# Patient Record
Sex: MALE | Race: WHITE | Employment: OTHER | URBAN - METROPOLITAN AREA
[De-identification: names, ages, dates, MRNs, and addresses within clinical notes are randomized per-mention and may not be internally consistent; named-entity substitution may affect disease eponyms.]

---

## 2017-01-25 ENCOUNTER — OFFICE VISIT (OUTPATIENT)
Dept: ORTHOPEDIC SURGERY | Age: 57
End: 2017-01-25

## 2017-01-25 VITALS
HEART RATE: 78 BPM | HEIGHT: 73 IN | WEIGHT: 221 LBS | DIASTOLIC BLOOD PRESSURE: 79 MMHG | BODY MASS INDEX: 29.29 KG/M2 | SYSTOLIC BLOOD PRESSURE: 128 MMHG

## 2017-01-25 DIAGNOSIS — G89.29 CHRONIC PAIN OF LEFT KNEE: Primary | ICD-10-CM

## 2017-01-25 DIAGNOSIS — M25.562 CHRONIC PAIN OF LEFT KNEE: Primary | ICD-10-CM

## 2017-01-25 PROCEDURE — 99213 OFFICE O/P EST LOW 20 MIN: CPT | Performed by: ORTHOPAEDIC SURGERY

## 2017-01-25 PROCEDURE — 20610 DRAIN/INJ JOINT/BURSA W/O US: CPT | Performed by: ORTHOPAEDIC SURGERY

## 2017-01-27 ENCOUNTER — TELEPHONE (OUTPATIENT)
Dept: ORTHOPEDIC SURGERY | Age: 57
End: 2017-01-27

## 2017-03-08 ENCOUNTER — OFFICE VISIT (OUTPATIENT)
Dept: ORTHOPEDIC SURGERY | Age: 57
End: 2017-03-08

## 2017-03-08 VITALS
BODY MASS INDEX: 29.29 KG/M2 | DIASTOLIC BLOOD PRESSURE: 79 MMHG | WEIGHT: 221 LBS | HEIGHT: 73 IN | SYSTOLIC BLOOD PRESSURE: 131 MMHG | HEART RATE: 88 BPM

## 2017-03-08 DIAGNOSIS — M25.562 LEFT KNEE PAIN, UNSPECIFIED CHRONICITY: Primary | ICD-10-CM

## 2017-03-08 PROCEDURE — 99213 OFFICE O/P EST LOW 20 MIN: CPT | Performed by: ORTHOPAEDIC SURGERY

## 2017-03-28 ENCOUNTER — TELEPHONE (OUTPATIENT)
Dept: ORTHOPEDIC SURGERY | Age: 57
End: 2017-03-28

## 2017-05-19 ENCOUNTER — TELEPHONE (OUTPATIENT)
Dept: ORTHOPEDIC SURGERY | Age: 57
End: 2017-05-19

## 2018-09-02 ENCOUNTER — HOSPITAL ENCOUNTER (EMERGENCY)
Age: 58
Discharge: HOME | End: 2018-09-03
Payer: MEDICARE

## 2018-09-02 DIAGNOSIS — F41.1 ANXIETY STATE: Primary | ICD-10-CM

## 2018-09-02 DIAGNOSIS — R51.9 NONINTRACTABLE HEADACHE, UNSPECIFIED CHRONICITY PATTERN, UNSPECIFIED HEADACHE TYPE: ICD-10-CM

## 2018-09-02 DIAGNOSIS — R11.0 NAUSEA: ICD-10-CM

## 2018-09-02 LAB
A/G RATIO: 1.4 (ref 1.1–2.2)
ALBUMIN SERPL-MCNC: 4 G/DL (ref 3.4–5)
ALP BLD-CCNC: 77 U/L (ref 40–129)
ALT SERPL-CCNC: 13 U/L (ref 10–40)
ANION GAP SERPL CALCULATED.3IONS-SCNC: 10 MMOL/L (ref 3–16)
AST SERPL-CCNC: 13 U/L (ref 15–37)
BASOPHILS ABSOLUTE: 0.1 K/UL (ref 0–0.2)
BASOPHILS RELATIVE PERCENT: 0.6 %
BILIRUB SERPL-MCNC: 0.5 MG/DL (ref 0–1)
BUN BLDV-MCNC: 14 MG/DL (ref 7–20)
CALCIUM SERPL-MCNC: 9.2 MG/DL (ref 8.3–10.6)
CHLORIDE BLD-SCNC: 99 MMOL/L (ref 99–110)
CO2: 27 MMOL/L (ref 21–32)
CREAT SERPL-MCNC: 0.7 MG/DL (ref 0.9–1.3)
EOSINOPHILS ABSOLUTE: 0 K/UL (ref 0–0.6)
EOSINOPHILS RELATIVE PERCENT: 0.2 %
GFR AFRICAN AMERICAN: >60
GFR NON-AFRICAN AMERICAN: >60
GLOBULIN: 2.8 G/DL
GLUCOSE BLD-MCNC: 127 MG/DL (ref 70–99)
HCT VFR BLD CALC: 43.5 % (ref 40.5–52.5)
HEMOGLOBIN: 15 G/DL (ref 13.5–17.5)
LYMPHOCYTES ABSOLUTE: 2.2 K/UL (ref 1–5.1)
LYMPHOCYTES RELATIVE PERCENT: 20.8 %
MCH RBC QN AUTO: 28.4 PG (ref 26–34)
MCHC RBC AUTO-ENTMCNC: 34.5 G/DL (ref 31–36)
MCV RBC AUTO: 82.3 FL (ref 80–100)
MONOCYTES ABSOLUTE: 0.6 K/UL (ref 0–1.3)
MONOCYTES RELATIVE PERCENT: 5.4 %
NEUTROPHILS ABSOLUTE: 7.6 K/UL (ref 1.7–7.7)
NEUTROPHILS RELATIVE PERCENT: 73 %
PDW BLD-RTO: 14.9 % (ref 12.4–15.4)
PLATELET # BLD: 194 K/UL (ref 135–450)
PMV BLD AUTO: 8.6 FL (ref 5–10.5)
POTASSIUM SERPL-SCNC: 3.7 MMOL/L (ref 3.5–5.1)
RBC # BLD: 5.29 M/UL (ref 4.2–5.9)
SODIUM BLD-SCNC: 136 MMOL/L (ref 136–145)
TOTAL PROTEIN: 6.8 G/DL (ref 6.4–8.2)
TROPONIN: <0.01 NG/ML
WBC # BLD: 10.4 K/UL (ref 4–11)

## 2018-09-02 PROCEDURE — 80053 COMPREHEN METABOLIC PANEL: CPT

## 2018-09-02 PROCEDURE — 84484 ASSAY OF TROPONIN QUANT: CPT

## 2018-09-02 PROCEDURE — 85025 COMPLETE CBC W/AUTO DIFF WBC: CPT

## 2018-09-02 PROCEDURE — 93005 ELECTROCARDIOGRAM TRACING: CPT | Performed by: EMERGENCY MEDICINE

## 2018-09-02 PROCEDURE — 99285 EMERGENCY DEPT VISIT HI MDM: CPT

## 2018-09-02 ASSESSMENT — PAIN DESCRIPTION - LOCATION: LOCATION: RIB CAGE

## 2018-09-02 ASSESSMENT — PAIN SCALES - GENERAL: PAINLEVEL_OUTOF10: 4

## 2018-09-02 ASSESSMENT — PAIN DESCRIPTION - PAIN TYPE: TYPE: CHRONIC PAIN

## 2018-09-03 ENCOUNTER — APPOINTMENT (OUTPATIENT)
Dept: GENERAL RADIOLOGY | Age: 58
End: 2018-09-03
Payer: MEDICARE

## 2018-09-03 VITALS
TEMPERATURE: 97.7 F | HEIGHT: 72 IN | WEIGHT: 200 LBS | DIASTOLIC BLOOD PRESSURE: 99 MMHG | SYSTOLIC BLOOD PRESSURE: 134 MMHG | RESPIRATION RATE: 20 BRPM | BODY MASS INDEX: 27.09 KG/M2 | OXYGEN SATURATION: 95 % | HEART RATE: 64 BPM

## 2018-09-03 LAB
BACTERIA: ABNORMAL /HPF
BILIRUBIN URINE: NEGATIVE
BLOOD, URINE: ABNORMAL
CLARITY: CLEAR
COLOR: YELLOW
EKG ATRIAL RATE: 72 BPM
EKG DIAGNOSIS: NORMAL
EKG P AXIS: 35 DEGREES
EKG P-R INTERVAL: 154 MS
EKG Q-T INTERVAL: 436 MS
EKG QRS DURATION: 150 MS
EKG QTC CALCULATION (BAZETT): 477 MS
EKG R AXIS: 73 DEGREES
EKG T AXIS: 9 DEGREES
EKG VENTRICULAR RATE: 72 BPM
EPITHELIAL CELLS, UA: ABNORMAL /HPF
GLUCOSE URINE: NEGATIVE MG/DL
KETONES, URINE: NEGATIVE MG/DL
LEUKOCYTE ESTERASE, URINE: NEGATIVE
MICROSCOPIC EXAMINATION: YES
NITRITE, URINE: NEGATIVE
PH UA: 6
PROTEIN UA: NEGATIVE MG/DL
RBC UA: ABNORMAL /HPF (ref 0–2)
SPECIFIC GRAVITY UA: 1.02
URINE TYPE: ABNORMAL
UROBILINOGEN, URINE: 0.2 E.U./DL
WBC UA: ABNORMAL /HPF (ref 0–5)

## 2018-09-03 PROCEDURE — 6370000000 HC RX 637 (ALT 250 FOR IP): Performed by: EMERGENCY MEDICINE

## 2018-09-03 PROCEDURE — 71046 X-RAY EXAM CHEST 2 VIEWS: CPT

## 2018-09-03 PROCEDURE — 93010 ELECTROCARDIOGRAM REPORT: CPT | Performed by: INTERNAL MEDICINE

## 2018-09-03 PROCEDURE — 81001 URINALYSIS AUTO W/SCOPE: CPT

## 2018-09-03 PROCEDURE — 6360000002 HC RX W HCPCS: Performed by: NURSE PRACTITIONER

## 2018-09-03 RX ORDER — 0.9 % SODIUM CHLORIDE 0.9 %
1000 INTRAVENOUS SOLUTION INTRAVENOUS ONCE
Status: DISCONTINUED | OUTPATIENT
Start: 2018-09-03 | End: 2018-09-03

## 2018-09-03 RX ORDER — HYDROXYZINE PAMOATE 25 MG/1
25 CAPSULE ORAL 3 TIMES DAILY PRN
Qty: 42 CAPSULE | Refills: 0 | Status: SHIPPED | OUTPATIENT
Start: 2018-09-03 | End: 2018-09-17

## 2018-09-03 RX ORDER — NAPROXEN 250 MG/1
500 TABLET ORAL ONCE
Status: COMPLETED | OUTPATIENT
Start: 2018-09-03 | End: 2018-09-03

## 2018-09-03 RX ORDER — KETOROLAC TROMETHAMINE 30 MG/ML
30 INJECTION, SOLUTION INTRAMUSCULAR; INTRAVENOUS ONCE
Status: DISCONTINUED | OUTPATIENT
Start: 2018-09-03 | End: 2018-09-03

## 2018-09-03 RX ORDER — METOCLOPRAMIDE 10 MG/1
10 TABLET ORAL 4 TIMES DAILY
Qty: 30 TABLET | Refills: 0 | Status: SHIPPED | OUTPATIENT
Start: 2018-09-03 | End: 2018-11-27 | Stop reason: ALTCHOICE

## 2018-09-03 RX ORDER — HYDROXYZINE PAMOATE 25 MG/1
50 CAPSULE ORAL ONCE
Status: COMPLETED | OUTPATIENT
Start: 2018-09-03 | End: 2018-09-03

## 2018-09-03 RX ORDER — METOCLOPRAMIDE 10 MG/1
10 TABLET ORAL 4 TIMES DAILY
Qty: 120 TABLET | Refills: 3 | Status: SHIPPED | OUTPATIENT
Start: 2018-09-03 | End: 2018-09-03

## 2018-09-03 RX ORDER — ONDANSETRON 4 MG/1
4 TABLET, ORALLY DISINTEGRATING ORAL ONCE
Status: COMPLETED | OUTPATIENT
Start: 2018-09-03 | End: 2018-09-03

## 2018-09-03 RX ORDER — METOCLOPRAMIDE 10 MG/1
10 TABLET ORAL ONCE
Status: COMPLETED | OUTPATIENT
Start: 2018-09-03 | End: 2018-09-03

## 2018-09-03 RX ADMIN — NAPROXEN 500 MG: 250 TABLET ORAL at 02:36

## 2018-09-03 RX ADMIN — METOCLOPRAMIDE 10 MG: 10 TABLET ORAL at 02:36

## 2018-09-03 RX ADMIN — HYDROXYZINE PAMOATE 50 MG: 25 CAPSULE ORAL at 02:36

## 2018-09-03 RX ADMIN — ONDANSETRON 4 MG: 4 TABLET, ORALLY DISINTEGRATING ORAL at 01:01

## 2018-09-03 NOTE — ED PROVIDER NOTES
[facility] Emergency Department    CHIEF COMPLAINT  Palpitations (patient states he was working outside today and was stung. Has had feeling of his heart racing since )      HISTORY OF PRESENT ILLNESS  Soha Cruz is a 62 y.o. male who presents to the ED complaining of being stung by a wasp earlier this afternoon patient denies any known allergies to be or wasp. Patient reports he has had palpitations and felt \"his heart racing throughout the day. \" Patient reports he was working outside all day and \"is concerned he may have heat exhaustion. \" Patient has been drinking plenty of fluids and eating fine. \" Patient reports he hasn't taken any of his prescribed medications today. Patient reports feeling lightheaded denies any dizziness, chest pain or shortness of breath. No abdominal pain or discomfort. Patient reports nausea denies vomiting, or diarrhea. No numbness or tingling in extremity. No unilateral weakness. Patient drove self to emergency department for evaluation. No other complaints, modifying factors or associated symptoms. Nursing notes reviewed. Past Medical History:   Diagnosis Date    Asthma     Depression     Fractured pelvis ([address]     Fractured rib     Hypertension     Pneumonia     Pneumothorax, left      Past Surgical History:   Procedure Laterality Date    BACK SURGERY      FRACTURE SURGERY      LAMINECTOMY      LUMBAR FUSION      LUMBAR SPINE SURGERY      pain stimulator placed in lower back left side L4-L5      Family History   Problem Relation Age of Onset    Cancer Father     Stroke Sister     Cancer Brother      Social History     Social History    Marital status:      Spouse name: N/A    Number of children: N/A    Years of education: N/A     Occupational History    Not on file.      Social History Main Topics    Smoking status: Current Every Day Smoker     Packs/day: 0.50     Years: 15.00     Types: Cigarettes    Smokeless
Specific Gravity, UA 1.020 1.005 - 1.030    Blood, Urine MODERATE (A) Negative    pH, UA 6.0 5.0 - 8.0    Protein, UA Negative Negative mg/dL    Urobilinogen, Urine 0.2 <2.0 E.U./dL    Nitrite, Urine Negative Negative    Leukocyte Esterase, Urine Negative Negative    Microscopic Examination YES     Urine Type Not Specified    Microscopic Urinalysis   Result Value Ref Range    WBC, UA 3-5 0 - 5 /HPF    RBC, UA 3-5 (A) 0 - 2 /HPF    Epi Cells 0-2 /HPF    Bacteria, UA Rare (A) /HPF       RADIOLOGY  XR CHEST STANDARD (2 VW)   Final Result   1. Small left pleural effusion versus pleural thickening. 2. Left basilar subsegmental atelectasis, scar or infiltrate. Medical Decision Making and Plan:  Pertinent Labs & Imaging studies reviewed. (See chart for details)  I agree with PHU assessment and plan. Patient was given the following medications:  Medications   hydrOXYzine (VISTARIL) capsule 50 mg (not administered)   metoclopramide (REGLAN) tablet 10 mg (not administered)   naproxen (NAPROSYN) tablet 500 mg (not administered)   ondansetron (ZOFRAN-ODT) disintegrating tablet 4 mg (4 mg Oral Given 9/3/18 0101)       The patient tolerated their visit well. The patient and / or the family were informed of the results of any tests, a time was given to answer questions. FINAL IMPRESSION      1. Anxiety state    2. Nonintractable headache, unspecified chronicity pattern, unspecified headache type    3. Nausea          DISPOSITION/PLAN   DISPOSITION Decision To Discharge 09/03/2018 02:27:50 AM      PATIENT REFERRED TO:  No follow-up provider specified.     DISCHARGE MEDICATIONS:  New Prescriptions    No medications on file       DISCONTINUED MEDICATIONS:  Discontinued Medications    CELECOXIB (CELEBREX) 200 MG CAPSULE    Take 200 mg by mouth 2 times daily    GABAPENTIN (NEURONTIN) 600 MG TABLET    Take 600 mg by mouth 3 times daily              (Please note that portions of this note were completed with a

## 2018-11-27 ENCOUNTER — OFFICE VISIT (OUTPATIENT)
Dept: FAMILY MEDICINE CLINIC | Age: 58
End: 2018-11-27
Payer: MEDICARE

## 2018-11-27 VITALS
HEIGHT: 72 IN | OXYGEN SATURATION: 96 % | SYSTOLIC BLOOD PRESSURE: 121 MMHG | BODY MASS INDEX: 28.04 KG/M2 | WEIGHT: 207 LBS | DIASTOLIC BLOOD PRESSURE: 80 MMHG | HEART RATE: 75 BPM

## 2018-11-27 DIAGNOSIS — Z12.11 SCREENING FOR COLON CANCER: ICD-10-CM

## 2018-11-27 DIAGNOSIS — Z13.31 POSITIVE DEPRESSION SCREENING: ICD-10-CM

## 2018-11-27 DIAGNOSIS — Z23 NEED FOR PROPHYLACTIC VACCINATION AND INOCULATION AGAINST VARICELLA: ICD-10-CM

## 2018-11-27 DIAGNOSIS — R63.4 WEIGHT LOSS, UNINTENTIONAL: ICD-10-CM

## 2018-11-27 DIAGNOSIS — Z00.00 HEALTH CARE MAINTENANCE: ICD-10-CM

## 2018-11-27 DIAGNOSIS — F41.9 ANXIETY: ICD-10-CM

## 2018-11-27 DIAGNOSIS — F32.A DEPRESSION, UNSPECIFIED DEPRESSION TYPE: Primary | ICD-10-CM

## 2018-11-27 DIAGNOSIS — Z63.4 GRIEF AT LOSS OF CHILD: ICD-10-CM

## 2018-11-27 DIAGNOSIS — F43.21 GRIEF AT LOSS OF CHILD: ICD-10-CM

## 2018-11-27 LAB
A/G RATIO: 1.9 (ref 1.1–2.2)
ALBUMIN SERPL-MCNC: 4.8 G/DL (ref 3.4–5)
ALP BLD-CCNC: 83 U/L (ref 40–129)
ALT SERPL-CCNC: 9 U/L (ref 10–40)
ANION GAP SERPL CALCULATED.3IONS-SCNC: 11 MMOL/L (ref 3–16)
AST SERPL-CCNC: 13 U/L (ref 15–37)
BILIRUB SERPL-MCNC: 0.4 MG/DL (ref 0–1)
BUN BLDV-MCNC: 19 MG/DL (ref 7–20)
C-REACTIVE PROTEIN: 4.9 MG/L (ref 0–5.1)
CALCIUM SERPL-MCNC: 9.8 MG/DL (ref 8.3–10.6)
CHLORIDE BLD-SCNC: 97 MMOL/L (ref 99–110)
CHOLESTEROL, FASTING: 199 MG/DL (ref 0–199)
CO2: 30 MMOL/L (ref 21–32)
CREAT SERPL-MCNC: 0.8 MG/DL (ref 0.9–1.3)
GFR AFRICAN AMERICAN: >60
GFR NON-AFRICAN AMERICAN: >60
GLOBULIN: 2.5 G/DL
GLUCOSE BLD-MCNC: 87 MG/DL (ref 70–99)
HDLC SERPL-MCNC: 47 MG/DL (ref 40–60)
LDL CHOLESTEROL CALCULATED: 138 MG/DL
POTASSIUM SERPL-SCNC: 5.1 MMOL/L (ref 3.5–5.1)
SODIUM BLD-SCNC: 138 MMOL/L (ref 136–145)
TOTAL PROTEIN: 7.3 G/DL (ref 6.4–8.2)
TRIGLYCERIDE, FASTING: 72 MG/DL (ref 0–150)
TSH SERPL DL<=0.05 MIU/L-ACNC: 1.98 UIU/ML (ref 0.27–4.2)
VLDLC SERPL CALC-MCNC: 14 MG/DL

## 2018-11-27 PROCEDURE — 3017F COLORECTAL CA SCREEN DOC REV: CPT | Performed by: FAMILY MEDICINE

## 2018-11-27 PROCEDURE — G8431 POS CLIN DEPRES SCRN F/U DOC: HCPCS | Performed by: FAMILY MEDICINE

## 2018-11-27 PROCEDURE — 4004F PT TOBACCO SCREEN RCVD TLK: CPT | Performed by: FAMILY MEDICINE

## 2018-11-27 PROCEDURE — G8427 DOCREV CUR MEDS BY ELIG CLIN: HCPCS | Performed by: FAMILY MEDICINE

## 2018-11-27 PROCEDURE — 99204 OFFICE O/P NEW MOD 45 MIN: CPT | Performed by: FAMILY MEDICINE

## 2018-11-27 PROCEDURE — G8419 CALC BMI OUT NRM PARAM NOF/U: HCPCS | Performed by: FAMILY MEDICINE

## 2018-11-27 PROCEDURE — G8484 FLU IMMUNIZE NO ADMIN: HCPCS | Performed by: FAMILY MEDICINE

## 2018-11-27 PROCEDURE — G0444 DEPRESSION SCREEN ANNUAL: HCPCS | Performed by: FAMILY MEDICINE

## 2018-11-27 RX ORDER — FLUOXETINE HYDROCHLORIDE 20 MG/1
20 CAPSULE ORAL DAILY
Qty: 30 CAPSULE | Refills: 3 | Status: SHIPPED | OUTPATIENT
Start: 2018-11-27 | End: 2018-11-27

## 2018-11-27 SDOH — SOCIAL STABILITY - SOCIAL INSECURITY: DISSAPEARANCE AND DEATH OF FAMILY MEMBER: Z63.4

## 2018-11-27 ASSESSMENT — PATIENT HEALTH QUESTIONNAIRE - PHQ9
SUM OF ALL RESPONSES TO PHQ QUESTIONS 1-9: 21
4. FEELING TIRED OR HAVING LITTLE ENERGY: 3
3. TROUBLE FALLING OR STAYING ASLEEP: 3
7. TROUBLE CONCENTRATING ON THINGS, SUCH AS READING THE NEWSPAPER OR WATCHING TELEVISION: 3
10. IF YOU CHECKED OFF ANY PROBLEMS, HOW DIFFICULT HAVE THESE PROBLEMS MADE IT FOR YOU TO DO YOUR WORK, TAKE CARE OF THINGS AT HOME, OR GET ALONG WITH OTHER PEOPLE: 3
1. LITTLE INTEREST OR PLEASURE IN DOING THINGS: 3
5. POOR APPETITE OR OVEREATING: 3
2. FEELING DOWN, DEPRESSED OR HOPELESS: 3
SUM OF ALL RESPONSES TO PHQ9 QUESTIONS 1 & 2: 6
SUM OF ALL RESPONSES TO PHQ QUESTIONS 1-9: 21
6. FEELING BAD ABOUT YOURSELF - OR THAT YOU ARE A FAILURE OR HAVE LET YOURSELF OR YOUR FAMILY DOWN: 3
9. THOUGHTS THAT YOU WOULD BE BETTER OFF DEAD, OR OF HURTING YOURSELF: 0
8. MOVING OR SPEAKING SO SLOWLY THAT OTHER PEOPLE COULD HAVE NOTICED. OR THE OPPOSITE, BEING SO FIGETY OR RESTLESS THAT YOU HAVE BEEN MOVING AROUND A LOT MORE THAN USUAL: 0

## 2018-11-27 ASSESSMENT — ENCOUNTER SYMPTOMS
COUGH: 0
ABDOMINAL PAIN: 0

## 2018-11-28 LAB
BASOPHILS ABSOLUTE: 0.1 K/UL (ref 0–0.2)
BASOPHILS RELATIVE PERCENT: 0.7 %
EOSINOPHILS ABSOLUTE: 0.1 K/UL (ref 0–0.6)
EOSINOPHILS RELATIVE PERCENT: 1.4 %
ESTIMATED AVERAGE GLUCOSE: 119.8 MG/DL
HAV IGM SER IA-ACNC: NORMAL
HBA1C MFR BLD: 5.8 %
HCT VFR BLD CALC: 46.7 % (ref 40.5–52.5)
HEMOGLOBIN: 15.7 G/DL (ref 13.5–17.5)
HEPATITIS B CORE IGM ANTIBODY: NORMAL
HEPATITIS B SURFACE ANTIGEN INTERPRETATION: NORMAL
HEPATITIS C ANTIBODY INTERPRETATION: NORMAL
HIV AG/AB: NORMAL
HIV ANTIGEN: NORMAL
HIV-1 ANTIBODY: NORMAL
HIV-2 AB: NORMAL
LYMPHOCYTES ABSOLUTE: 2.5 K/UL (ref 1–5.1)
LYMPHOCYTES RELATIVE PERCENT: 28.2 %
MCH RBC QN AUTO: 28.7 PG (ref 26–34)
MCHC RBC AUTO-ENTMCNC: 33.7 G/DL (ref 31–36)
MCV RBC AUTO: 85.2 FL (ref 80–100)
MONOCYTES ABSOLUTE: 0.4 K/UL (ref 0–1.3)
MONOCYTES RELATIVE PERCENT: 4.7 %
NEUTROPHILS ABSOLUTE: 5.8 K/UL (ref 1.7–7.7)
NEUTROPHILS RELATIVE PERCENT: 65 %
PDW BLD-RTO: 13.9 % (ref 12.4–15.4)
PLATELET # BLD: 188 K/UL (ref 135–450)
PLATELET SLIDE REVIEW: ADEQUATE
PMV BLD AUTO: 9.8 FL (ref 5–10.5)
RBC # BLD: 5.48 M/UL (ref 4.2–5.9)
RBC # BLD: NORMAL 10*6/UL
SLIDE REVIEW: NORMAL
TOTAL SYPHILLIS IGG/IGM: NORMAL
WBC # BLD: 8.8 K/UL (ref 4–11)

## 2018-11-29 ENCOUNTER — HOSPITAL ENCOUNTER (OUTPATIENT)
Dept: GENERAL RADIOLOGY | Age: 58
Discharge: HOME | End: 2018-11-29
Payer: MEDICARE

## 2018-11-29 ENCOUNTER — NURSE ONLY (OUTPATIENT)
Dept: FAMILY MEDICINE CLINIC | Age: 58
End: 2018-11-29
Payer: MEDICARE

## 2018-11-29 DIAGNOSIS — R63.4 WEIGHT LOSS, UNINTENTIONAL: ICD-10-CM

## 2018-11-29 DIAGNOSIS — R63.4 WEIGHT LOSS, UNINTENTIONAL: Primary | ICD-10-CM

## 2018-11-29 LAB
BILIRUBIN URINE: NEGATIVE
BLOOD, URINE: ABNORMAL
CLARITY: CLEAR
COLOR: YELLOW
CONTROL: POSITIVE
EPITHELIAL CELLS, UA: 0 /HPF (ref 0–5)
GLUCOSE URINE: NEGATIVE MG/DL
HEMOCCULT STL QL: NEGATIVE
HYALINE CASTS: 0 /LPF (ref 0–8)
KETONES, URINE: NEGATIVE MG/DL
LEUKOCYTE ESTERASE, URINE: NEGATIVE
MICROSCOPIC EXAMINATION: YES
NITRITE, URINE: NEGATIVE
PH UA: 6
PROTEIN UA: NEGATIVE MG/DL
RBC UA: 1 /HPF (ref 0–4)
SPECIFIC GRAVITY UA: 1.03
URINE TYPE: ABNORMAL
UROBILINOGEN, URINE: 0.2 E.U./DL
WBC UA: 0 /HPF (ref 0–5)

## 2018-11-29 PROCEDURE — 71046 X-RAY EXAM CHEST 2 VIEWS: CPT

## 2018-11-29 PROCEDURE — 82274 ASSAY TEST FOR BLOOD FECAL: CPT | Performed by: FAMILY MEDICINE

## 2018-11-29 PROCEDURE — 81003 URINALYSIS AUTO W/O SCOPE: CPT | Performed by: FAMILY MEDICINE

## 2018-12-03 LAB
C. TRACHOMATIS DNA ,URINE: NEGATIVE
N. GONORRHOEAE DNA, URINE: NEGATIVE

## 2019-02-05 ENCOUNTER — HOSPITAL ENCOUNTER (OUTPATIENT)
Dept: VASCULAR LAB | Age: 59
Discharge: HOME | End: 2019-02-05
Payer: MEDICARE

## 2019-02-05 ENCOUNTER — OFFICE VISIT (OUTPATIENT)
Dept: FAMILY MEDICINE CLINIC | Age: 59
End: 2019-02-05
Payer: MEDICARE

## 2019-02-05 ENCOUNTER — HOSPITAL ENCOUNTER (OUTPATIENT)
Dept: GENERAL RADIOLOGY | Age: 59
Discharge: HOME | End: 2019-02-05
Payer: MEDICARE

## 2019-02-05 VITALS — HEART RATE: 79 BPM | SYSTOLIC BLOOD PRESSURE: 135 MMHG | OXYGEN SATURATION: 96 % | DIASTOLIC BLOOD PRESSURE: 78 MMHG

## 2019-02-05 DIAGNOSIS — M54.50 ACUTE LEFT-SIDED LOW BACK PAIN WITHOUT SCIATICA: ICD-10-CM

## 2019-02-05 DIAGNOSIS — R29.898 LEFT LEG WEAKNESS: Primary | ICD-10-CM

## 2019-02-05 DIAGNOSIS — R29.898 LEFT LEG WEAKNESS: ICD-10-CM

## 2019-02-05 DIAGNOSIS — I83.813 VARICOSE VEINS OF BOTH LOWER EXTREMITIES WITH PAIN: ICD-10-CM

## 2019-02-05 PROCEDURE — 3017F COLORECTAL CA SCREEN DOC REV: CPT | Performed by: FAMILY MEDICINE

## 2019-02-05 PROCEDURE — 93971 EXTREMITY STUDY: CPT

## 2019-02-05 PROCEDURE — 72100 X-RAY EXAM L-S SPINE 2/3 VWS: CPT

## 2019-02-05 PROCEDURE — G8419 CALC BMI OUT NRM PARAM NOF/U: HCPCS | Performed by: FAMILY MEDICINE

## 2019-02-05 PROCEDURE — G8484 FLU IMMUNIZE NO ADMIN: HCPCS | Performed by: FAMILY MEDICINE

## 2019-02-05 PROCEDURE — 99214 OFFICE O/P EST MOD 30 MIN: CPT | Performed by: FAMILY MEDICINE

## 2019-02-05 PROCEDURE — 4004F PT TOBACCO SCREEN RCVD TLK: CPT | Performed by: FAMILY MEDICINE

## 2019-02-05 PROCEDURE — G8427 DOCREV CUR MEDS BY ELIG CLIN: HCPCS | Performed by: FAMILY MEDICINE

## 2019-02-05 RX ORDER — DULOXETIN HYDROCHLORIDE 30 MG/1
CAPSULE, DELAYED RELEASE ORAL
Refills: 0 | COMMUNITY
Start: 2018-12-29 | End: 2019-08-06

## 2019-02-05 RX ORDER — TRAZODONE HYDROCHLORIDE 100 MG/1
100 TABLET ORAL NIGHTLY
Refills: 0 | COMMUNITY
Start: 2019-01-25 | End: 2020-02-11

## 2019-02-05 RX ORDER — DULOXETIN HYDROCHLORIDE 60 MG/1
CAPSULE, DELAYED RELEASE ORAL
Refills: 0 | COMMUNITY
Start: 2019-01-25 | End: 2019-08-06

## 2019-02-08 ENCOUNTER — TELEPHONE (OUTPATIENT)
Dept: FAMILY MEDICINE CLINIC | Age: 59
End: 2019-02-08

## 2019-02-08 DIAGNOSIS — Z98.890 HX OF DECOMPRESSIVE LUMBAR LAMINECTOMY: ICD-10-CM

## 2019-02-08 DIAGNOSIS — M51.36 LUMBAR DEGENERATIVE DISC DISEASE: Primary | ICD-10-CM

## 2019-02-13 ENCOUNTER — HOSPITAL ENCOUNTER (OUTPATIENT)
Dept: CT IMAGING | Age: 59
Discharge: HOME | End: 2019-02-13
Payer: MEDICARE

## 2019-02-13 DIAGNOSIS — Z98.890 HX OF DECOMPRESSIVE LUMBAR LAMINECTOMY: ICD-10-CM

## 2019-02-13 DIAGNOSIS — M51.36 LUMBAR DEGENERATIVE DISC DISEASE: ICD-10-CM

## 2019-02-13 PROCEDURE — 72131 CT LUMBAR SPINE W/O DYE: CPT

## 2019-02-14 ENCOUNTER — TELEPHONE (OUTPATIENT)
Dept: FAMILY MEDICINE CLINIC | Age: 59
End: 2019-02-14

## 2019-02-14 DIAGNOSIS — Z98.890 HX OF DECOMPRESSIVE LUMBAR LAMINECTOMY: ICD-10-CM

## 2019-02-14 DIAGNOSIS — M51.36 LUMBAR DEGENERATIVE DISC DISEASE: Primary | ICD-10-CM

## 2019-02-27 ENCOUNTER — TELEPHONE (OUTPATIENT)
Dept: FAMILY MEDICINE CLINIC | Age: 59
End: 2019-02-27

## 2019-02-27 NOTE — TELEPHONE ENCOUNTER
Nai vilchis/ [facility] office (pt's ) called and states that she needs the office to fax a copy of the patient's CT Scan of the Lumbar Spine to patient's pain management doctor-Jaden Gerber attn: Emelia Medrano.   She said that Dr. Jasmyn Gerber wants to refer the patient to another doctor, who sees workers comp, for treatment but he needs a copy of the CT scan so that he can attach it to the referral.

## 2019-03-12 ENCOUNTER — TELEPHONE (OUTPATIENT)
Dept: FAMILY MEDICINE CLINIC | Age: 59
End: 2019-03-12

## 2019-03-12 DIAGNOSIS — M48.061 NARROWING OF LUMBAR SPINE: Primary | ICD-10-CM

## 2019-03-14 ENCOUNTER — TELEPHONE (OUTPATIENT)
Dept: FAMILY MEDICINE CLINIC | Age: 59
End: 2019-03-14

## 2019-03-14 NOTE — TELEPHONE ENCOUNTER
Called pt and left message that Pramod will contact him, but if he has not heard anything from them to call us back. Referral and imaging results faxed to [phone].

## 2019-03-18 ENCOUNTER — TELEPHONE (OUTPATIENT)
Dept: FAMILY MEDICINE CLINIC | Age: 59
End: 2019-03-18

## 2019-03-18 NOTE — TELEPHONE ENCOUNTER
Patient called and would like to have a MA call him back regarding his CT Scan that he had done on 02.13.19. I started to give him the results but he would rather talk to an MA. Please call patient.

## 2019-03-20 ENCOUNTER — TELEPHONE (OUTPATIENT)
Dept: FAMILY MEDICINE CLINIC | Age: 59
End: 2019-03-20

## 2019-03-20 DIAGNOSIS — Z83.49: Primary | ICD-10-CM

## 2019-03-21 ENCOUNTER — TELEPHONE (OUTPATIENT)
Dept: FAMILY MEDICINE CLINIC | Age: 59
End: 2019-03-21

## 2019-03-29 ENCOUNTER — OFFICE VISIT (OUTPATIENT)
Dept: FAMILY MEDICINE CLINIC | Age: 59
End: 2019-03-29
Payer: MEDICARE

## 2019-03-29 ENCOUNTER — HOSPITAL ENCOUNTER (EMERGENCY)
Age: 59
Discharge: HOME | End: 2019-03-29
Attending: EMERGENCY MEDICINE
Payer: MEDICARE

## 2019-03-29 ENCOUNTER — APPOINTMENT (OUTPATIENT)
Dept: GENERAL RADIOLOGY | Age: 59
End: 2019-03-29
Payer: MEDICARE

## 2019-03-29 VITALS
HEIGHT: 72 IN | WEIGHT: 218 LBS | RESPIRATION RATE: 16 BRPM | BODY MASS INDEX: 29.53 KG/M2 | HEART RATE: 68 BPM | DIASTOLIC BLOOD PRESSURE: 70 MMHG | OXYGEN SATURATION: 97 % | SYSTOLIC BLOOD PRESSURE: 102 MMHG

## 2019-03-29 VITALS
HEART RATE: 59 BPM | HEIGHT: 72 IN | BODY MASS INDEX: 29.53 KG/M2 | WEIGHT: 218 LBS | TEMPERATURE: 98.1 F | SYSTOLIC BLOOD PRESSURE: 121 MMHG | OXYGEN SATURATION: 93 % | DIASTOLIC BLOOD PRESSURE: 76 MMHG | RESPIRATION RATE: 18 BRPM

## 2019-03-29 DIAGNOSIS — I45.10 RIGHT BUNDLE BRANCH BLOCK (RBBB) DETERMINED BY ELECTROCARDIOGRAPHY: Primary | ICD-10-CM

## 2019-03-29 DIAGNOSIS — G89.29 OTHER CHRONIC PAIN: ICD-10-CM

## 2019-03-29 DIAGNOSIS — R07.89 OTHER CHEST PAIN: Primary | ICD-10-CM

## 2019-03-29 LAB
A/G RATIO: 1.4 (ref 1.1–2.2)
ALBUMIN SERPL-MCNC: 3.7 G/DL (ref 3.4–5)
ALP BLD-CCNC: 74 U/L (ref 40–129)
ALT SERPL-CCNC: 10 U/L (ref 10–40)
ANION GAP SERPL CALCULATED.3IONS-SCNC: 8 MMOL/L (ref 3–16)
AST SERPL-CCNC: 10 U/L (ref 15–37)
BASOPHILS ABSOLUTE: 0 K/UL (ref 0–0.2)
BASOPHILS RELATIVE PERCENT: 0.7 %
BILIRUB SERPL-MCNC: <0.2 MG/DL (ref 0–1)
BUN BLDV-MCNC: 20 MG/DL (ref 7–20)
CALCIUM SERPL-MCNC: 8.8 MG/DL (ref 8.3–10.6)
CHLORIDE BLD-SCNC: 100 MMOL/L (ref 99–110)
CO2: 29 MMOL/L (ref 21–32)
CREAT SERPL-MCNC: 0.8 MG/DL (ref 0.9–1.3)
EKG ATRIAL RATE: 62 BPM
EKG DIAGNOSIS: NORMAL
EKG P-R INTERVAL: 138 MS
EKG Q-T INTERVAL: 444 MS
EKG QRS DURATION: 146 MS
EKG QTC CALCULATION (BAZETT): 450 MS
EKG R AXIS: 51 DEGREES
EKG T AXIS: 20 DEGREES
EKG VENTRICULAR RATE: 62 BPM
EOSINOPHILS ABSOLUTE: 0.1 K/UL (ref 0–0.6)
EOSINOPHILS RELATIVE PERCENT: 1.7 %
GFR AFRICAN AMERICAN: >60
GFR NON-AFRICAN AMERICAN: >60
GLOBULIN: 2.7 G/DL
GLUCOSE BLD-MCNC: 96 MG/DL (ref 70–99)
HCT VFR BLD CALC: 40.8 % (ref 40.5–52.5)
HEMOGLOBIN: 13.9 G/DL (ref 13.5–17.5)
LYMPHOCYTES ABSOLUTE: 2.6 K/UL (ref 1–5.1)
LYMPHOCYTES RELATIVE PERCENT: 35 %
MCH RBC QN AUTO: 28.7 PG (ref 26–34)
MCHC RBC AUTO-ENTMCNC: 34.1 G/DL (ref 31–36)
MCV RBC AUTO: 84.1 FL (ref 80–100)
MONOCYTES ABSOLUTE: 0.4 K/UL (ref 0–1.3)
MONOCYTES RELATIVE PERCENT: 5.6 %
NEUTROPHILS ABSOLUTE: 4.2 K/UL (ref 1.7–7.7)
NEUTROPHILS RELATIVE PERCENT: 57 %
PDW BLD-RTO: 14 % (ref 12.4–15.4)
PLATELET # BLD: 189 K/UL (ref 135–450)
PMV BLD AUTO: 8.2 FL (ref 5–10.5)
POTASSIUM SERPL-SCNC: 4.1 MMOL/L (ref 3.5–5.1)
RBC # BLD: 4.85 M/UL (ref 4.2–5.9)
SODIUM BLD-SCNC: 137 MMOL/L (ref 136–145)
TOTAL PROTEIN: 6.4 G/DL (ref 6.4–8.2)
TROPONIN: <0.01 NG/ML
WBC # BLD: 7.4 K/UL (ref 4–11)

## 2019-03-29 PROCEDURE — 85025 COMPLETE CBC W/AUTO DIFF WBC: CPT

## 2019-03-29 PROCEDURE — G8419 CALC BMI OUT NRM PARAM NOF/U: HCPCS | Performed by: FAMILY MEDICINE

## 2019-03-29 PROCEDURE — 93005 ELECTROCARDIOGRAM TRACING: CPT | Performed by: EMERGENCY MEDICINE

## 2019-03-29 PROCEDURE — 99213 OFFICE O/P EST LOW 20 MIN: CPT | Performed by: FAMILY MEDICINE

## 2019-03-29 PROCEDURE — 93000 ELECTROCARDIOGRAM COMPLETE: CPT | Performed by: FAMILY MEDICINE

## 2019-03-29 PROCEDURE — 84484 ASSAY OF TROPONIN QUANT: CPT

## 2019-03-29 PROCEDURE — 71046 X-RAY EXAM CHEST 2 VIEWS: CPT

## 2019-03-29 PROCEDURE — 80053 COMPREHEN METABOLIC PANEL: CPT

## 2019-03-29 PROCEDURE — G8427 DOCREV CUR MEDS BY ELIG CLIN: HCPCS | Performed by: FAMILY MEDICINE

## 2019-03-29 PROCEDURE — 99284 EMERGENCY DEPT VISIT MOD MDM: CPT

## 2019-03-29 PROCEDURE — 93010 ELECTROCARDIOGRAM REPORT: CPT | Performed by: INTERNAL MEDICINE

## 2019-03-29 PROCEDURE — G8484 FLU IMMUNIZE NO ADMIN: HCPCS | Performed by: FAMILY MEDICINE

## 2019-03-29 PROCEDURE — 3017F COLORECTAL CA SCREEN DOC REV: CPT | Performed by: FAMILY MEDICINE

## 2019-03-29 PROCEDURE — 4004F PT TOBACCO SCREEN RCVD TLK: CPT | Performed by: FAMILY MEDICINE

## 2019-03-29 ASSESSMENT — HEART SCORE: ECG: 1

## 2019-03-29 ASSESSMENT — PAIN DESCRIPTION - PAIN TYPE: TYPE: CHRONIC PAIN

## 2019-03-29 NOTE — ED PROVIDER NOTES
I independently performed a history and physical on Noah Santiago. All diagnostic, treatment, and disposition decisions were made by myself in conjunction with the advanced practice provider.     -Noah Santiago is a 62 y.o. male with a history of chronic back pain with pain stimulator got an EKG and found it was abnormal and told patient to go to ED for evaluation. -patient reports intermittent left chest pain x 2-3 months. Last episode of chest pain was 2 days ago.   -currently chest pain free   -HEART score: 3  patient is getting gready to have surgery to L4-L5 and pain management physicain asked patient to get ekg  -The Ekg interpreted by me shows  normal sinus rhythm with a rate of 60  Axis is   Normal  QTc is  normal  Intervals and Durations are unremarkable. ST Segments: no acute change  No significant change from prior EKG dated 9/2/2018  -workup wnl  -CXR: No acute cardiopulmonary process demonstrated. Chronic pleural parenchymal changes on the left   -repeat troponin negative  -No acute pathology was noted and plan for discharge home with close follow up with PCP was discussed with patient and family. Strict ED return precautions given for new/worsending symptoms. Patient and family in agreement with plan, verbally confirm understanding and have no further questions/concerns. For further details of [address] emergency department encounter, please see NP Marne Boas documentation.         Manolo Bonilla MD  03/30/19 3771

## 2019-03-29 NOTE — ED PROVIDER NOTES
auscultation. Without wheezing, rales, or rhonchi. CADIOVASCULAR:  Regular rate and rhythm. Normal S1-S2 sounds. No murmurs, rubs, or gallops. GI: Soft, nontender, nondistended with positive bowel sounds. No rebound tenderness, guarding or any peritoneal signs. No masses orhepatosplenomegaly     MUSCULOSKELETAL:  No gross deformities or trauma noted. Moving all extremities equally and appropriately. Normal ROM. SKIN: Warm/dry. Skin is intact. No rashes/lesions noted. PSYCHIATRIC: Mood and affect appropriate. Speech is clear and articulate. NEUROLOGIC: Alert and oriented. No focal motor or sensory deficits. Gait was observed and is normal.    PROCEDURES  None    RADIOLOGY  Xr Chest Standard (2 Vw)    Result Date: 3/29/2019  EXAMINATION: TWO VIEWS OF THE CHEST 3/29/2019 5:41 pm COMPARISON: 11/29/2018 HISTORY: ORDERING SYSTEM PROVIDED HISTORY: abnml EKG TECHNOLOGIST PROVIDED HISTORY: Reason for exam:->abnml EKG Ordering Physician Provided Reason for Exam: Abnormal Test Results (pt has a pain stimulator for the back and they were requesting an EKG, PCP did one today and told to come to ED due to it being abnormal ) Acuity: Acute Type of Exam: Initial FINDINGS: Thoracic spinal cord stimulator noted. Heart size and pulmonary vessels within normal limits. Pleural thickening in the left costophrenic lung with subpleural linear opacities similar to prior. No new pulmonary abnormality     1. No acute cardiopulmonary process demonstrated 2.  Chronic pleural-parenchymal changes on the left       LABS  Results for orders placed or performed during the hospital encounter of 03/29/19   Troponin   Result Value Ref Range    Troponin <0.01 <0.01 ng/mL   CBC auto differential   Result Value Ref Range    WBC 7.4 4.0 - 11.0 K/uL    RBC 4.85 4.20 - 5.90 M/uL    Hemoglobin 13.9 13.5 - 17.5 g/dL    Hematocrit 40.8 40.5 - 52.5 %    MCV 84.1 80.0 - 100.0 fL    MCH 28.7 26.0 - 34.0 pg    MCHC 34.1 31.0 - 36.0 g/dL    RDW 14.0 12.4 - 15.4 %    Platelets 199 995 - 780 K/uL    MPV 8.2 5.0 - 10.5 fL    Neutrophils % 57.0 %    Lymphocytes % 35.0 %    Monocytes % 5.6 %    Eosinophils % 1.7 %    Basophils % 0.7 %    Neutrophils # 4.2 1.7 - 7.7 K/uL    Lymphocytes # 2.6 1.0 - 5.1 K/uL    Monocytes # 0.4 0.0 - 1.3 K/uL    Eosinophils # 0.1 0.0 - 0.6 K/uL    Basophils # 0.0 0.0 - 0.2 K/uL   Comprehensive metabolic panel   Result Value Ref Range    Sodium 137 136 - 145 mmol/L    Potassium 4.1 3.5 - 5.1 mmol/L    Chloride 100 99 - 110 mmol/L    CO2 29 21 - 32 mmol/L    Anion Gap 8 3 - 16    Glucose 96 70 - 99 mg/dL    BUN 20 7 - 20 mg/dL    CREATININE 0.8 (L) 0.9 - 1.3 mg/dL    GFR Non-African American >60 >60    GFR African American >60 >60    Calcium 8.8 8.3 - 10.6 mg/dL    Total Protein 6.4 6.4 - 8.2 g/dL    Alb 3.7 3.4 - 5.0 g/dL    Albumin/Globulin Ratio 1.4 1.1 - 2.2    Total Bilirubin <0.2 0.0 - 1.0 mg/dL    Alkaline Phosphatase 74 40 - 129 U/L    ALT 10 10 - 40 U/L    AST 10 (L) 15 - 37 U/L    Globulin 2.7 g/dL   Troponin   Result Value Ref Range    Troponin <0.01 <0.01 ng/mL   Troponin   Result Value Ref Range    Troponin <0.01 <0.01 ng/mL   EKG 12 Lead   Result Value Ref Range    Ventricular Rate 62 BPM    Atrial Rate 62 BPM    P-R Interval 138 ms    QRS Duration 146 ms    Q-T Interval 444 ms    QTc Calculation (Bazett) 450 ms    R Axis 51 degrees    T Axis 20 degrees    Diagnosis       Baseline artifactNormal sinus rhythmRight bundle branch blockAbnormal ECGConfirmed by Jaden Max MD, Sindy Geiger ([id number]) on 3/29/2019 7:16:57 PM     ED COURSE/MDM  Patient seen and evaluated. Old records reviewed. Diagnostic testing results reviewed and discussed    I have seen and evaluated this patient with supervising physician: Vasu Harding MD. We thoroughly discussed the history, physical exam, diagnostic testing, emergency department course, plan and disposition. Norma Pederson presented to the ED today with above noted complaints.  Physical exam is unremarkable. He was here for abnormal EKG, no changes were seen on tonight's EKG from prior. Troponin was obtained and initially was negative, repeat 3 hours later also negative. CBC is without evidence of systemic infection as there is no leukocytosis or differential shift. H&H is WNL. Platelets are WNL. CMP is without electrolyte abnormality. There is no evidence of kidney or liver dysfunction. Chest x-ray shows no acute cardiopulmonary process. There is chronic pleural parenchymal changes on the left. EKG shows normal sinus rhythm with right bundle branch block that is been seen on prior EKGs. At this point I do not feel the patient requires further work up and it is reasonable to discharge the patient. Please refer to AVS for further details regarding discharge instructions. A discussion was had with the patient regarding diagnosis, diagnostic testing results, treatment/ plan of care, and follow up. All questions were answered. Patient will follow up as directed for further evaluation/treatment. The patient was given strict return precautions as we discussed symptoms that would necessitate return to the ED. Patient will return to ED for new/worsening symptoms. The patient verbalized their understanding and agreement with the above plan. I estimate there is LOW risk for PULMONARY EMBOLISM, ACUTE CORONARY SYNDROME, OR THORACIC AORTIC DISSECTION, thus I consider the discharge disposition reasonable. Sindi Chavez and I have discussed the diagnosis and risks, and we agree with discharging home to follow-up with their primary doctor. We also discussed returning to the Emergency Department immediately if new or worsening symptoms occur. We have discussed the symptoms which are most concerning (e.g., bloody sputum, fever, worsening pain or shortness of breath, vomiting) that necessitate immediate return. FINAL Impression    1.  Right bundle branch block (RBBB) determined by electrocardiography    2. Other chronic pain        Blood pressure 121/76, pulse 59, temperature 98.1 °F (36.7 °C), temperature source Oral, resp. rate 18, height 6' (1.829 m), weight 218 lb (98.9 kg), SpO2 93 %. Patient was sent home with a prescription for below medication/s. I did Tribe patient on appropriate use of these medication. New Prescriptions    No medications on file       FOLLOW UP  Julio Michelle, [address] [place] [address]  [phone]    Call   As needed    [facility]  ED  [facility] [address]  [phone]  Go to   If symptoms worsen      DISPOSITION  Patient was discharged to home in good condition. Comment: Please note this report has been produced using speech recognition software and may contain errors related to that system including errors in grammar, punctuation, and spelling, as well as words and phrases that may be inappropriate. If there are any questions or concerns please feel free to contact the dictating provider for clarification.         MICHELLE Vieyra - MICAH  03/29/19 6623

## 2019-03-30 NOTE — ED NOTES
Pt requesting to leave. IV removed. Hellen Elizondo NP notified.  Getting paperwork ready     Maliha Gomez RN  03/29/19 2118

## 2019-03-30 NOTE — ED NOTES
PT requesting again to leave. This nurse to bedside with paperwork. Pt did not want paperwork just wanted a copy of EKG and leave. PT did not want anything else. Pt stable at d/c, iv removed. Shilpa Aguilera NP gave pt copy of EKG. Pt states that they were not able to \"capture\" EKG correctly. This nurse tried to explain to patient that a Doctor read the EKG that I was showing him. PT did not believe this nurse. Pt went to registration and told them, he recorded the person that did his EKG.         Zenaida Dean RN  03/29/19 5558

## 2019-04-23 ENCOUNTER — TELEPHONE (OUTPATIENT)
Dept: FAMILY MEDICINE CLINIC | Age: 59
End: 2019-04-23

## 2019-04-23 NOTE — TELEPHONE ENCOUNTER
Pt has Future Appt with PCP on 4/24/19. Pt requesting advice on having his back stimulator removed, he states it is not working & Dr. Oral Aviles is refusing to remove his stimulator for him.   Pt also needing advise about Cardiologist referral.  [phone]

## 2019-04-24 ENCOUNTER — OFFICE VISIT (OUTPATIENT)
Dept: FAMILY MEDICINE CLINIC | Age: 59
End: 2019-04-24
Payer: MEDICARE

## 2019-04-24 ENCOUNTER — TELEPHONE (OUTPATIENT)
Dept: FAMILY MEDICINE CLINIC | Age: 59
End: 2019-04-24

## 2019-04-24 VITALS
SYSTOLIC BLOOD PRESSURE: 130 MMHG | OXYGEN SATURATION: 94 % | BODY MASS INDEX: 29.16 KG/M2 | WEIGHT: 215 LBS | HEART RATE: 58 BPM | DIASTOLIC BLOOD PRESSURE: 76 MMHG | TEMPERATURE: 97.5 F

## 2019-04-24 DIAGNOSIS — F11.90 METHADONE USE: ICD-10-CM

## 2019-04-24 DIAGNOSIS — G89.4 CHRONIC PAIN DISORDER: ICD-10-CM

## 2019-04-24 DIAGNOSIS — R07.9 CHEST PAIN, UNSPECIFIED TYPE: Primary | ICD-10-CM

## 2019-04-24 DIAGNOSIS — F32.9 CURRENT EPISODE OF MAJOR DEPRESSIVE DISORDER WITHOUT PRIOR EPISODE, UNSPECIFIED DEPRESSION EPISODE SEVERITY: ICD-10-CM

## 2019-04-24 PROCEDURE — G8419 CALC BMI OUT NRM PARAM NOF/U: HCPCS | Performed by: FAMILY MEDICINE

## 2019-04-24 PROCEDURE — 99214 OFFICE O/P EST MOD 30 MIN: CPT | Performed by: FAMILY MEDICINE

## 2019-04-24 PROCEDURE — G8427 DOCREV CUR MEDS BY ELIG CLIN: HCPCS | Performed by: FAMILY MEDICINE

## 2019-04-24 PROCEDURE — 4004F PT TOBACCO SCREEN RCVD TLK: CPT | Performed by: FAMILY MEDICINE

## 2019-04-24 PROCEDURE — 3017F COLORECTAL CA SCREEN DOC REV: CPT | Performed by: FAMILY MEDICINE

## 2019-04-24 RX ORDER — SERTRALINE HYDROCHLORIDE 100 MG/1
100 TABLET, FILM COATED ORAL DAILY
Qty: 30 TABLET | Refills: 3 | Status: SHIPPED | OUTPATIENT
Start: 2019-04-24 | End: 2019-05-22 | Stop reason: SDUPTHER

## 2019-04-24 ASSESSMENT — PATIENT HEALTH QUESTIONNAIRE - PHQ9: DEPRESSION UNABLE TO ASSESS: URGENT/EMERGENT SITUATION

## 2019-04-24 NOTE — PROGRESS NOTES
2019     Sindi Chavez (:  1960)is a 62 y.o. male, here for evaluation of the following medical concerns:    CC: chest pain    HPI  Chest pain  Not currently having, ED work up neg on 3/29/19    methodone use  On 1mg of methadone TID for chronic pain    Depression  Semi controlled, still having     Review of Systems   Cardiovascular: Negative for chest pain. Not currently having chest discomfort   Psychiatric/Behavioral: Negative for suicidal ideas.        + decreased appetite, + insomnia     Prior to Visit Medications    Medication Sig Taking? Authorizing Provider   sertraline (ZOLOFT) 100 MG tablet Take 1 tablet by mouth daily Yes Celina Dakins, MD   DULoxetine (CYMBALTA) 60 MG extended release capsule TK ONE C PO  QAM WF Yes Historical Provider, MD   DULoxetine (CYMBALTA) 30 MG extended release capsule  Yes Historical Provider, MD   traZODone (DESYREL) 100 MG tablet TK 2 TS PO QHS Yes Historical Provider, MD   Gabapentin, Once-Daily, (GRALISE) 600 MG TABS Take 1,800 mg by mouth daily. . Yes Historical Provider, MD   methadone (DOLOPHINE) 5 MG tablet Take 5 mg by mouth every 4 hours as needed for Pain Yes Historical Provider, MD   ALPRAZolam (XANAX) 0.5 MG tablet Take 1 mg by mouth 3 times daily as needed. . Yes Historical Provider, MD        Social History     Tobacco Use    Smoking status: Current Every Day Smoker     Packs/day: 0.50     Years: 15.00     Pack years: 7.50     Types: Cigarettes    Smokeless tobacco: Former User    Tobacco comment: pt unsure    Substance Use Topics    Alcohol use: No        Vitals:    19 0904   BP: 130/76   Pulse: 58   Temp: 97.5 °F (36.4 °C)   SpO2: 94%   Weight: 215 lb (97.5 kg)     Estimated body mass index is 29.16 kg/m² as calculated from the following:    Height as of 3/29/19: 6' (1.829 m). Weight as of this encounter: 215 lb (97.5 kg). Physical Exam  Constitutional: appears well-developed and well-nourished. No distress.    HENT: Head: Normocephalic and atraumatic   Eyes: EOM are normal. Right eye exhibits no discharge. Left eye exhibits no discharge   Pulmonary/Chest: Effort normal   Skin: Patient is not diaphoretic     ASSESSMENT/PLAN:  Ami Caldera was seen today for other. Diagnoses and all orders for this visit:    Chest pain, unspecified type  -     Servando Leal MD, Cardiology, [facility]    Current episode of major depressive disorder without prior episode, unspecified depression episode severity  Increasing zoloft to 100mg daily  -     sertraline (ZOLOFT) 100 MG tablet; Take 1 tablet by mouth daily    Chronic pain disorder  -     [facility] ([facility]) Wen Elder MD, Pain Management, [facility]    Methadone use [facility])  Defer to pain mgmt    Return in about 1 month (around 5/22/2019). An electronic signature was used to authenticate this note.     --Charmayne Britain, MD on 4/24/2019 at 11:32 AM

## 2019-05-01 NOTE — PROGRESS NOTES
[address] [facility]   Cardiovascular Evaluation    PATIENT: Toi Velásquez Lux: 2019  MRN: [id number]  [facility]: [id number]  : 1960      Primary Care Doctor: Phong Yusuf MD  Reason for evaluation:   Chest Pain      Subjective:   History of present illness on initial date of evaluation:   Debby Lovelace is a 62 y.o. patient who presents referred by his PCP, Dr. Alonzo Story for chest pain. Today he reports he feels the chest pain is more related to stress. He states he sees a pain specialist for his back and is unhappy with him. He has a spinal stimulator. He states he is held back from doing normal activities. He is emotional. States he lost his 3year old son in 2018. Denies shortness of breath, dizziness or syncope. Patient Active Problem List   Diagnosis    Methadone use ([address]    Atelectasis    Asthma    Drug overdose    Bilateral low back pain with sciatica    Chronic pain disorder    Hx of decompressive lumbar laminectomy    Lumbar degenerative disc disease    Mediastinal hematoma    Pneumothorax, left    Other chest pain         Past Medical History:   has a past medical history of Asthma, Depression, Fractured pelvis ([address] Fractured rib, Hypertension, Pneumonia, and Pneumothorax, left. Surgical History:   has a past surgical history that includes laminectomy; lumbar fusion; fracture surgery; Lumbar spine surgery; and back surgery. Social History:   reports that he has been smoking cigarettes. He has a 7.50 pack-year smoking history. He has quit using smokeless tobacco. He reports that he has current or past drug history. He reports that he does not drink alcohol. Family History:  No evidence for sudden cardiac death or premature CAD    Home Medications:  Reviewed and are listed in nursing record.  and/or listed below  Current Outpatient Medications   Medication Sig Dispense Refill    sertraline (ZOLOFT) 100 MG tablet Take 1 tablet by mouth daily 30 tablet 3    DULoxetine (CYMBALTA) 60 MG extended release capsule TK ONE C PO  QAM WF  0    DULoxetine (CYMBALTA) 30 MG extended release capsule   0    traZODone (DESYREL) 100 MG tablet TK 2 TS PO QHS  0    Gabapentin, Once-Daily, (GRALISE) 600 MG TABS Take 1,800 mg by mouth daily. Hailee Glatter methadone (DOLOPHINE) 5 MG tablet Take 5 mg by mouth every 4 hours as needed for Pain      ALPRAZolam (XANAX) 0.5 MG tablet Take 1 mg by mouth 3 times daily as needed. .       No current facility-administered medications for this visit. Allergies:  Demerol hcl [meperidine] and Zoloft [sertraline]     Review of Systems:   A 14 point review of symptoms completed. Pertinent positives identified in the HPI, all other review of symptoms negative as below.     Objective:   PHYSICAL EXAM:    Vitals:    05/02/19 0947   BP: 122/70   Pulse: 77   SpO2: 98%    Weight: 212 lb 12.8 oz (96.5 kg)     Wt Readings from Last 3 Encounters:   05/02/19 212 lb 12.8 oz (96.5 kg)   04/24/19 215 lb (97.5 kg)   03/29/19 218 lb (98.9 kg)         General Appearance:  Alert, cooperative, no distress, appears stated age   Head:  Normocephalic, atraumatic   Eyes:  PERRL, conjunctiva/corneas clear   Nose: Nares normal, no drainage or sinus tenderness   Throat: Lips, mucosa, and tongue normal   Neck: Supple, symmetrical, trachea midline, NL thyroid no carotid bruit or JVD   Lungs:   CTAB, respirations unlabored   Chest Wall:  No tenderness or deformity   Heart:  Regular rhythm and normal rate; S1, S2 are normal;   no murmur noted; no rub or gallop   Abdomen:   Soft, non-tender, +BS x 4, no masses, no organomegaly   Extremities: Extremities normal, atraumatic, no cyanosis or edema   Pulses: 2+ and symmetric   Skin: Skin color, texture, turgor normal, no rashes or lesions   Pysch:  anxious, teraful   Neurologic: Normal gross motor and sensory exam.         LABS   CBC:      Lab Results   Component Value Date    WBC 7.4 03/29/2019    RBC 4.85 2019    HGB 13.9 2019    HCT 40.8 2019    MCV 84.1 2019    RDW 14.0 2019     2019     CMP:  Lab Results   Component Value Date     2019    K 4.1 2019     2019    CO2 29 2019    BUN 20 2019    CREATININE 0.8 2019    GFRAA >60 2019    AGRATIO 1.4 2019    LABGLOM >60 2019    GLUCOSE 96 2019    PROT 6.4 2019    CALCIUM 8.8 2019    BILITOT <0.2 2019    ALKPHOS 74 2019    AST 10 2019    ALT 10 2019     PT/INR:   No results found for: PTINR  Liver:  No components found for: CHLPL  Lab Results   Component Value Date    ALT 10 2019    AST 10 (L) 2019    ALKPHOS 74 2019    BILITOT <0.2 2019     Lab Results   Component Value Date    LABA1C 5.8 2018     Lipids:       No results found for: TRIG         Lab Results   Component Value Date    HDL 47 2018            Lab Results   Component Value Date    LDLCALC 138 (H) 2018            Lab Results   Component Value Date    LABVLDL 14 2018         CARDIAC DATA   EK2019 NSR with RBBB    ECHO/MUGA: 10/2014   Overall left ventricular function is normal.   Ejection fraction is visually estimated to be 55-60 %. Mild concentric left ventricular hypertrophy is present. Left ventricle size is normal.   The right ventricle is enlarged. Tricuspid valve is structurally normal.   Trivial tricuspid regurgitation. Systolic pulmonary artery pressure (SPAP) is normal and estimated at 15 mmHg   (RA pressure 3 mmHg). Right atrial size is dilated . STRESS TEST:    CARDIAC CATH:    VASCULAR/OTHER IMAGING:      Assessment and Plan   Cayla Dc is a 62 y.o. male who presents today for the following problems:      1. CP: pt not active due to pain, atypical CP  2. Abnormal echo: enlarged RV  3. Hx of ? Drug use and overdose leading to ICU stay    MD Plan:  1. Echo for RV  2. Laura-myoview   - pt with spinal stimulator  3. Not current cardiac contraindications to pain therapies at this time. Feel free to call with specific questions. Patient Active Problem List   Diagnosis    Methadone use ([address]    Atelectasis    Asthma    Drug overdose    Bilateral low back pain with sciatica    Chronic pain disorder    Hx of decompressive lumbar laminectomy    Lumbar degenerative disc disease    Mediastinal hematoma    Pneumothorax, left    Other chest pain       Patient Plan:  1. Echocardiogram  2. Lexiscan  3. We will call you with the results        It is a pleasure to assist in the care of Ofelia Cardoza. Please call with any questions. This note was scribed in the presence of Dr. Carolyn Noe MD by Татьяна Andrew RN. The scribes documentation has been prepared under my direction and personally reviewed by me in its entirety. I confirm that the note above accurately reflects all work, treatment, procedures, and medical decision making performed by me. I, Dr. Thuan Lopez MD, personally performed the services described in this documentation as scribed by Tom Spence in my presence, and it is both accurate and complete to the best of our ability.        Thuan Lopez MD, [address] [facility] Cardiologist  [address]  [phone] [facility]  [phone] [address] [place] [address]

## 2019-05-02 ENCOUNTER — OFFICE VISIT (OUTPATIENT)
Dept: CARDIOLOGY CLINIC | Age: 59
End: 2019-05-02
Payer: MEDICARE

## 2019-05-02 VITALS
HEIGHT: 72 IN | BODY MASS INDEX: 28.82 KG/M2 | DIASTOLIC BLOOD PRESSURE: 70 MMHG | HEART RATE: 77 BPM | OXYGEN SATURATION: 98 % | SYSTOLIC BLOOD PRESSURE: 122 MMHG | WEIGHT: 212.8 LBS

## 2019-05-02 DIAGNOSIS — R93.1 ABNORMAL ECHOCARDIOGRAM: ICD-10-CM

## 2019-05-02 DIAGNOSIS — R07.89 OTHER CHEST PAIN: Primary | ICD-10-CM

## 2019-05-02 PROCEDURE — G8427 DOCREV CUR MEDS BY ELIG CLIN: HCPCS | Performed by: INTERNAL MEDICINE

## 2019-05-02 PROCEDURE — 99203 OFFICE O/P NEW LOW 30 MIN: CPT | Performed by: INTERNAL MEDICINE

## 2019-05-02 PROCEDURE — 3017F COLORECTAL CA SCREEN DOC REV: CPT | Performed by: INTERNAL MEDICINE

## 2019-05-02 PROCEDURE — G8419 CALC BMI OUT NRM PARAM NOF/U: HCPCS | Performed by: INTERNAL MEDICINE

## 2019-05-02 PROCEDURE — 93000 ELECTROCARDIOGRAM COMPLETE: CPT | Performed by: INTERNAL MEDICINE

## 2019-05-02 PROCEDURE — 4004F PT TOBACCO SCREEN RCVD TLK: CPT | Performed by: INTERNAL MEDICINE

## 2019-05-02 NOTE — LETTER
 sertraline (ZOLOFT) 100 MG tablet Take 1 tablet by mouth daily 30 tablet 3    DULoxetine (CYMBALTA) 60 MG extended release capsule TK ONE C PO  QAM WF  0    DULoxetine (CYMBALTA) 30 MG extended release capsule   0    traZODone (DESYREL) 100 MG tablet TK 2 TS PO QHS  0    Gabapentin, Once-Daily, (GRALISE) 600 MG TABS Take 1,800 mg by mouth daily. Nellene Rain methadone (DOLOPHINE) 5 MG tablet Take 5 mg by mouth every 4 hours as needed for Pain      ALPRAZolam (XANAX) 0.5 MG tablet Take 1 mg by mouth 3 times daily as needed. .       No current facility-administered medications for this visit. Allergies:  Demerol hcl [meperidine] and Zoloft [sertraline]     Review of Systems:   A 14 point review of symptoms completed. Pertinent positives identified in the HPI, all other review of symptoms negative as below.     Objective:   PHYSICAL EXAM:    Vitals:    05/02/19 0947   BP: 122/70   Pulse: 77   SpO2: 98%    Weight: 212 lb 12.8 oz (96.5 kg)     Wt Readings from Last 3 Encounters:   05/02/19 212 lb 12.8 oz (96.5 kg)   04/24/19 215 lb (97.5 kg)   03/29/19 218 lb (98.9 kg)         General Appearance:  Alert, cooperative, no distress, appears stated age   Head:  Normocephalic, atraumatic   Eyes:  PERRL, conjunctiva/corneas clear   Nose: Nares normal, no drainage or sinus tenderness   Throat: Lips, mucosa, and tongue normal   Neck: Supple, symmetrical, trachea midline, NL thyroid no carotid bruit or JVD   Lungs:   CTAB, respirations unlabored   Chest Wall:  No tenderness or deformity   Heart:  Regular rhythm and normal rate; S1, S2 are normal;   no murmur noted; no rub or gallop   Abdomen:   Soft, non-tender, +BS x 4, no masses, no organomegaly   Extremities: Extremities normal, atraumatic, no cyanosis or edema   Pulses: 2+ and symmetric   Skin: Skin color, texture, turgor normal, no rashes or lesions   Pysch:  anxious, teraful   Neurologic: Normal gross motor and sensory exam.         LABS   CBC:      Lab Results 3. Hx of ? Drug use and overdose leading to ICU stay    MD Plan:  1. Echo for RV  2. Laura-myoview   - pt with spinal stimulator  3. Not current cardiac contraindications to pain therapies at this time. Feel free to call with specific questions. Patient Active Problem List   Diagnosis    Methadone use ([address]    Atelectasis    Asthma    Drug overdose    Bilateral low back pain with sciatica    Chronic pain disorder    Hx of decompressive lumbar laminectomy    Lumbar degenerative disc disease    Mediastinal hematoma    Pneumothorax, left    Other chest pain       Patient Plan:  1. Echocardiogram  2. Lexiscan  3. We will call you with the results        It is a pleasure to assist in the care of Garrett Moss. Please call with any questions. This note was scribed in the presence of Dr. Sharron Rivas MD by Annette Magallon RN. The scribes documentation has been prepared under my direction and personally reviewed by me in its entirety. I confirm that the note above accurately reflects all work, treatment, procedures, and medical decision making performed by me. I, Dr. Viral Cooley MD, personally performed the services described in this documentation as scribed by Jeanine Kay in my presence, and it is both accurate and complete to the best of our ability.        Viral Cooley MD, [address] [facility] Cardiologist  [facility]  [phone] [facility]  [phone] [address] [place] [address]

## 2019-05-07 ENCOUNTER — TELEPHONE (OUTPATIENT)
Dept: CARDIOLOGY CLINIC | Age: 59
End: 2019-05-07

## 2019-05-07 NOTE — TELEPHONE ENCOUNTER
Pt would like a phone call about information that he needs to be sent to [place] Del Carmen Capellan on [address] in [facility]. Fax: [phone]. It sounds like he may need JJP's last office note faxed so that he can get a refill on pain meds. Pt is at the office now.

## 2019-05-10 ENCOUNTER — HOSPITAL ENCOUNTER (OUTPATIENT)
Dept: CARDIOLOGY | Age: 59
Discharge: HOME | End: 2019-05-10
Payer: MEDICARE

## 2019-05-10 ENCOUNTER — TELEPHONE (OUTPATIENT)
Dept: CARDIOLOGY CLINIC | Age: 59
End: 2019-05-10

## 2019-05-10 ENCOUNTER — APPOINTMENT (OUTPATIENT)
Dept: NON INVASIVE DIAGNOSTICS | Age: 59
End: 2019-05-10
Payer: MEDICARE

## 2019-05-10 DIAGNOSIS — R07.89 OTHER CHEST PAIN: ICD-10-CM

## 2019-05-10 LAB
LV EF: 55 %
LV EF: 73 %
LVEF MODALITY: NORMAL
LVEF MODALITY: NORMAL

## 2019-05-10 PROCEDURE — 6360000002 HC RX W HCPCS: Performed by: INTERNAL MEDICINE

## 2019-05-10 PROCEDURE — 93306 TTE W/DOPPLER COMPLETE: CPT

## 2019-05-10 PROCEDURE — A9502 TC99M TETROFOSMIN: HCPCS | Performed by: INTERNAL MEDICINE

## 2019-05-10 PROCEDURE — 93017 CV STRESS TEST TRACING ONLY: CPT

## 2019-05-10 PROCEDURE — 3430000000 HC RX DIAGNOSTIC RADIOPHARMACEUTICAL: Performed by: INTERNAL MEDICINE

## 2019-05-10 PROCEDURE — 78452 HT MUSCLE IMAGE SPECT MULT: CPT

## 2019-05-10 RX ADMIN — TETROFOSMIN 34.8 MILLICURIE: 1.38 INJECTION, POWDER, LYOPHILIZED, FOR SOLUTION INTRAVENOUS at 13:30

## 2019-05-10 RX ADMIN — REGADENOSON 0.4 MG: 0.08 INJECTION, SOLUTION INTRAVENOUS at 13:30

## 2019-05-10 RX ADMIN — TETROFOSMIN 11.5 MILLICURIE: 1.38 INJECTION, POWDER, LYOPHILIZED, FOR SOLUTION INTRAVENOUS at 12:20

## 2019-05-10 NOTE — TELEPHONE ENCOUNTER
----- Message from Emelyn Patterson MD sent at 5/10/2019  3:49 PM EDT -----  Let patient know echo test shows normal heart function, no new orders or changes at this time. Thanks.

## 2019-05-10 NOTE — TELEPHONE ENCOUNTER
----- Message from Gaurang Herron MD sent at 5/10/2019  3:51 PM EDT -----  Let him know his stress test is normal, no new orders or changes at this time. Thanks.

## 2019-05-10 NOTE — TELEPHONE ENCOUNTER
Mr. Delphine Mayen called the office, results of echo and stress test were given. I continued the conversation asking him what he wanted to review in his chart. Patient first started off by saying that he didn't come to see Dr. Hargis Scheuermann for chest pain but for an EKG, \"how did someone who go to [facility] get that confused\". I tried to explain that we have chest pain as the reason base off his PCP's office note. He said that isn't the case. He continue down the list by stating that he has never done any drugs expect \"weed and isn't a pot head\". I informed him that \"weed\" is an illegal substance which makes it a drug in the medical world. He also become angry over the fact that drug overdose and methadone use is under his problem list, informed him that those where under his problem list before he became a patient of Dr. Hargis Scheuermann. Mr. Delphine Mayen started to yell over the phone that he is going to jered our office if we don't fix his chart in the next two weeks and we better be prepared for him. I tried to tell him that Dr. Hargis Scheuermann is currently out of the office and that I would have to speak with him regarding this but he was over talking me. I told the patient that I was going to hang the phone up as he keep threaten to jered Dr. Hargis Scheuermann and I, as well as telling me that I better be prepared for him. I notified my  of this.

## 2019-05-13 ENCOUNTER — TELEPHONE (OUTPATIENT)
Dept: CARDIOLOGY CLINIC | Age: 59
End: 2019-05-13

## 2019-05-13 NOTE — TELEPHONE ENCOUNTER
Noted. I saw pt for CP and abnormal ECG. Pt drug hx is on problem list and comes from 2016 hospital stay. I cannot/will not change it per D/c summary. It did not come from us and his anger is not warranted towards this office. Pt needs to see PCP to discuss. Given his testing was normal, there is no need to followup with us at this time.  Echo with only nonspecific mild atrial enlargement

## 2019-05-14 ENCOUNTER — TELEPHONE (OUTPATIENT)
Dept: CARDIOLOGY CLINIC | Age: 59
End: 2019-05-14

## 2019-05-14 NOTE — TELEPHONE ENCOUNTER
Pt called 5/14/19 , pt states that his  Amada from [address] [facility] will be contacting us about getting and EKG faxed over so that way the pt can continue pain management. Fax # for [employer] [phone]. Last OV w/JORGEP was 5/2/19.  Please advise, thank you

## 2019-05-16 ENCOUNTER — HOSPITAL ENCOUNTER (OUTPATIENT)
Dept: GENERAL RADIOLOGY | Age: 59
Discharge: HOME | End: 2019-05-16
Payer: COMMERCIAL

## 2019-05-16 DIAGNOSIS — M47.816 SPONDYLOSIS OF LUMBAR SPINE: ICD-10-CM

## 2019-05-16 PROCEDURE — 72110 X-RAY EXAM L-2 SPINE 4/>VWS: CPT

## 2019-05-22 DIAGNOSIS — F32.9 CURRENT EPISODE OF MAJOR DEPRESSIVE DISORDER WITHOUT PRIOR EPISODE, UNSPECIFIED DEPRESSION EPISODE SEVERITY: ICD-10-CM

## 2019-05-22 RX ORDER — SERTRALINE HYDROCHLORIDE 100 MG/1
100 TABLET, FILM COATED ORAL DAILY
Qty: 90 TABLET | Refills: 3 | Status: SHIPPED | OUTPATIENT
Start: 2019-05-22 | End: 2019-08-06

## 2019-05-22 NOTE — TELEPHONE ENCOUNTER
The e scribe from 4/24/19 did not go through to the pharmacy. Can you please sign off again. Thank you.

## 2019-07-02 ENCOUNTER — TELEPHONE (OUTPATIENT)
Dept: CARDIOLOGY CLINIC | Age: 59
End: 2019-07-02

## 2019-08-05 ENCOUNTER — HOSPITAL ENCOUNTER (OUTPATIENT)
Dept: MRI IMAGING | Age: 59
Discharge: HOME | End: 2019-08-05
Payer: COMMERCIAL

## 2019-08-05 DIAGNOSIS — R29.898 LEFT LEG WEAKNESS: ICD-10-CM

## 2019-08-05 PROCEDURE — 6360000004 HC RX CONTRAST MEDICATION: Performed by: FAMILY MEDICINE

## 2019-08-05 PROCEDURE — 72158 MRI LUMBAR SPINE W/O & W/DYE: CPT

## 2019-08-05 PROCEDURE — A9579 GAD-BASE MR CONTRAST NOS,1ML: HCPCS | Performed by: FAMILY MEDICINE

## 2019-08-05 RX ADMIN — GADOTERIDOL 19 ML: 279.3 INJECTION, SOLUTION INTRAVENOUS at 13:08

## 2019-08-06 ENCOUNTER — OFFICE VISIT (OUTPATIENT)
Dept: FAMILY MEDICINE CLINIC | Age: 59
End: 2019-08-06
Payer: MEDICARE

## 2019-08-06 VITALS
OXYGEN SATURATION: 98 % | WEIGHT: 215 LBS | BODY MASS INDEX: 29.16 KG/M2 | DIASTOLIC BLOOD PRESSURE: 70 MMHG | SYSTOLIC BLOOD PRESSURE: 130 MMHG | HEART RATE: 76 BPM

## 2019-08-06 DIAGNOSIS — Z00.00 HEALTHCARE MAINTENANCE: ICD-10-CM

## 2019-08-06 DIAGNOSIS — G89.29 CHRONIC BILATERAL LOW BACK PAIN WITH SCIATICA, SCIATICA LATERALITY UNSPECIFIED: Primary | ICD-10-CM

## 2019-08-06 DIAGNOSIS — F33.40 RECURRENT MAJOR DEPRESSIVE DISORDER, IN REMISSION (HCC): ICD-10-CM

## 2019-08-06 DIAGNOSIS — R60.0 LOWER EXTREMITY EDEMA: ICD-10-CM

## 2019-08-06 DIAGNOSIS — M54.40 CHRONIC BILATERAL LOW BACK PAIN WITH SCIATICA, SCIATICA LATERALITY UNSPECIFIED: Primary | ICD-10-CM

## 2019-08-06 PROCEDURE — G8419 CALC BMI OUT NRM PARAM NOF/U: HCPCS | Performed by: FAMILY MEDICINE

## 2019-08-06 PROCEDURE — 99214 OFFICE O/P EST MOD 30 MIN: CPT | Performed by: FAMILY MEDICINE

## 2019-08-06 PROCEDURE — 4004F PT TOBACCO SCREEN RCVD TLK: CPT | Performed by: FAMILY MEDICINE

## 2019-08-06 PROCEDURE — 3017F COLORECTAL CA SCREEN DOC REV: CPT | Performed by: FAMILY MEDICINE

## 2019-08-06 PROCEDURE — G8427 DOCREV CUR MEDS BY ELIG CLIN: HCPCS | Performed by: FAMILY MEDICINE

## 2019-08-06 NOTE — PROGRESS NOTES
results. Diagnoses and all orders for this visit:    Chronic bilateral low back pain with sciatica, sciatica laterality unspecified  Uncontrolled, pt instructed to f/u with ortho about back surgery, MRI shows   Previous lumbar fusion with pedicle screws and rods at L5-S1.  Disc and   osteophytes result in mild narrowing of the neural foramina at L2-3, L3-4,   and L4-5.  No stenosis of the thecal sac in the lumbar   region     Recurrent major depressive disorder, in remission ([facility]  Controlled, Cont to monitor    Lower ext edema  Unilateral, u/s ordered of [facility] main  Referral for colonoscopy entered    Return in about 3 months (around 11/6/2019). An electronic signature was used to authenticate this note.     --Deb Silva MD on 8/6/2019 at 5:07 PM

## 2019-08-07 DIAGNOSIS — Z12.11 COLON CANCER SCREENING: Primary | ICD-10-CM

## 2019-08-08 ENCOUNTER — HOSPITAL ENCOUNTER (OUTPATIENT)
Dept: VASCULAR LAB | Age: 59
Discharge: HOME | End: 2019-08-08
Payer: MEDICARE

## 2019-08-08 DIAGNOSIS — R60.0 LOWER EXTREMITY EDEMA: ICD-10-CM

## 2019-08-08 PROCEDURE — 93971 EXTREMITY STUDY: CPT

## 2019-08-08 RX ORDER — POLYETHYLENE GLYCOL 3350 17 G/17G
POWDER, FOR SOLUTION ORAL
Qty: 238 G | Refills: 0 | Status: SHIPPED | OUTPATIENT
Start: 2019-08-08 | End: 2019-08-30 | Stop reason: ALTCHOICE

## 2019-08-13 DIAGNOSIS — R59.1 LYMPHADENOPATHY: Primary | ICD-10-CM

## 2019-08-19 ENCOUNTER — ANESTHESIA EVENT (OUTPATIENT)
Dept: ENDOSCOPY | Age: 59
End: 2019-08-19
Payer: MEDICARE

## 2019-08-19 NOTE — ANESTHESIA PRE PROCEDURE
WBC 7.4 03/29/2019    RBC 4.85 03/29/2019    HGB 13.9 03/29/2019    HCT 40.8 03/29/2019    MCV 84.1 03/29/2019    RDW 14.0 03/29/2019     03/29/2019       CMP:   Lab Results   Component Value Date     03/29/2019    K 4.1 03/29/2019     03/29/2019    CO2 29 03/29/2019    BUN 20 03/29/2019    CREATININE 0.8 03/29/2019    GFRAA >60 03/29/2019    AGRATIO 1.4 03/29/2019    LABGLOM >60 03/29/2019    GLUCOSE 96 03/29/2019    PROT 6.4 03/29/2019    CALCIUM 8.8 03/29/2019    BILITOT <0.2 03/29/2019    ALKPHOS 74 03/29/2019    AST 10 03/29/2019    ALT 10 03/29/2019       POC Tests: No results for input(s): POCGLU, POCNA, POCK, POCCL, POCBUN, POCHEMO, POCHCT in the last 72 hours. Coags:   Lab Results   Component Value Date    PROTIME 11.3 08/28/2016    INR 0.99 08/28/2016    APTT 39.3 08/28/2016       HCG (If Applicable): No results found for: PREGTESTUR, PREGSERUM, HCG, HCGQUANT     ABGs:   Lab Results   Component Value Date    PHART 7.365 08/29/2016    PO2ART 62.2 08/29/2016    MDE5NWI 55.1 08/29/2016    KQS9IRT 30.8 08/29/2016    BEART 4.1 08/29/2016    Z3VPLJQL 91.1 08/29/2016        Type & Screen (If Applicable):  No results found for: LABABO, [address]    Anesthesia Evaluation  Patient summary reviewed no history of anesthetic complications:   Airway: Mallampati: II  TM distance: >3 FB   Neck ROM: full  Mouth opening: > = 3 FB Dental:    (+) upper dentures and lower dentures      Pulmonary: breath sounds clear to auscultation  (+) sleep apnea: on CPAP and noncompliant,  asthma: current smoker (1/2 PPD)          Patient smoked on day of surgery.                  Cardiovascular:    (+) hypertension (NO MEDS CURRENT):, dysrhythmias (\" IRREGULAR\"):,     (-) pacemaker, valvular problems/murmurs, past MI, CAD, CABG/stent,  angina and  CHF    ECG reviewed  Rhythm: regular  Rate: normal  Echocardiogram reviewed  Stress test reviewed       Beta Blocker:  Not on Beta Blocker         Neuro/Psych:   (+) neuromuscular disease (CHRONIC PAIN / METHADONE / LBP W/ LLE RAD SXS):, depression/anxiety  (DEP)   (-) seizures, TIA and CVA           GI/Hepatic/Renal:   (+) bowel prep,      (-) GERD, liver disease and no renal disease       Endo/Other:    (+) : arthritis:., no malignancy/cancer. (-) diabetes mellitus, hypothyroidism, hyperthyroidism, no malignancy/cancer               Abdominal:           Vascular: negative vascular ROS. Anesthesia Plan      TIVA     ASA 2       Induction: intravenous. MIPS: Prophylactic antiemetics administered. Anesthetic plan and risks discussed with patient. Plan discussed with CRNA. This pre-anesthesia assessment may be used as a history and physical.    DOS STAFF ADDENDUM:    Pt seen and examined, chart reviewed (including anesthesia, drug and allergy history). No interval changes to history and physical examination. Anesthetic plan, risks, benefits, alternatives, and personnel involved discussed with patient. Patient verbalized an understanding and agrees to proceed.       Adelia Monge MD  August 20, 2019  10:21 AM          Adelia Monge MD   8/20/2019

## 2019-08-20 ENCOUNTER — HOSPITAL ENCOUNTER (OUTPATIENT)
Age: 59
Setting detail: OUTPATIENT SURGERY
Discharge: HOME | End: 2019-08-20
Attending: INTERNAL MEDICINE | Admitting: INTERNAL MEDICINE
Payer: MEDICARE

## 2019-08-20 ENCOUNTER — ANESTHESIA (OUTPATIENT)
Dept: ENDOSCOPY | Age: 59
End: 2019-08-20
Payer: MEDICARE

## 2019-08-20 VITALS — SYSTOLIC BLOOD PRESSURE: 120 MMHG | DIASTOLIC BLOOD PRESSURE: 69 MMHG | OXYGEN SATURATION: 97 %

## 2019-08-20 VITALS
OXYGEN SATURATION: 95 % | WEIGHT: 215 LBS | SYSTOLIC BLOOD PRESSURE: 140 MMHG | RESPIRATION RATE: 16 BRPM | TEMPERATURE: 97.5 F | BODY MASS INDEX: 29.12 KG/M2 | HEART RATE: 58 BPM | DIASTOLIC BLOOD PRESSURE: 66 MMHG | HEIGHT: 72 IN

## 2019-08-20 DIAGNOSIS — Z12.11 COLON CANCER SCREENING: ICD-10-CM

## 2019-08-20 PROCEDURE — 2580000003 HC RX 258: Performed by: ANESTHESIOLOGY

## 2019-08-20 PROCEDURE — 3700000001 HC ADD 15 MINUTES (ANESTHESIA): Performed by: INTERNAL MEDICINE

## 2019-08-20 PROCEDURE — 2580000003 HC RX 258: Performed by: INTERNAL MEDICINE

## 2019-08-20 PROCEDURE — 3700000000 HC ANESTHESIA ATTENDED CARE: Performed by: INTERNAL MEDICINE

## 2019-08-20 PROCEDURE — 3609010500 HC COLONOSCOPY POLYPECTOMY REMOVAL HOT BIOPSY/STOMA: Performed by: INTERNAL MEDICINE

## 2019-08-20 PROCEDURE — 7100000010 HC PHASE II RECOVERY - FIRST 15 MIN: Performed by: INTERNAL MEDICINE

## 2019-08-20 PROCEDURE — 2500000003 HC RX 250 WO HCPCS: Performed by: ANESTHESIOLOGY

## 2019-08-20 PROCEDURE — 2709999900 HC NON-CHARGEABLE SUPPLY: Performed by: INTERNAL MEDICINE

## 2019-08-20 PROCEDURE — 7100000011 HC PHASE II RECOVERY - ADDTL 15 MIN: Performed by: INTERNAL MEDICINE

## 2019-08-20 PROCEDURE — 2500000003 HC RX 250 WO HCPCS: Performed by: NURSE ANESTHETIST, CERTIFIED REGISTERED

## 2019-08-20 PROCEDURE — 45385 COLONOSCOPY W/LESION REMOVAL: CPT | Performed by: INTERNAL MEDICINE

## 2019-08-20 PROCEDURE — 88305 TISSUE EXAM BY PATHOLOGIST: CPT

## 2019-08-20 PROCEDURE — 6360000002 HC RX W HCPCS: Performed by: NURSE ANESTHETIST, CERTIFIED REGISTERED

## 2019-08-20 RX ORDER — SODIUM CHLORIDE 0.9 % (FLUSH) 0.9 %
10 SYRINGE (ML) INJECTION PRN
Status: DISCONTINUED | OUTPATIENT
Start: 2019-08-20 | End: 2019-08-20 | Stop reason: HOSPADM

## 2019-08-20 RX ORDER — OXYCODONE HYDROCHLORIDE AND ACETAMINOPHEN 5; 325 MG/1; MG/1
1 TABLET ORAL PRN
Status: DISCONTINUED | OUTPATIENT
Start: 2019-08-20 | End: 2019-08-20 | Stop reason: HOSPADM

## 2019-08-20 RX ORDER — ONDANSETRON 2 MG/ML
4 INJECTION INTRAMUSCULAR; INTRAVENOUS
Status: DISCONTINUED | OUTPATIENT
Start: 2019-08-20 | End: 2019-08-20 | Stop reason: HOSPADM

## 2019-08-20 RX ORDER — SODIUM CHLORIDE, SODIUM LACTATE, POTASSIUM CHLORIDE, CALCIUM CHLORIDE 600; 310; 30; 20 MG/100ML; MG/100ML; MG/100ML; MG/100ML
INJECTION, SOLUTION INTRAVENOUS CONTINUOUS
Status: DISCONTINUED | OUTPATIENT
Start: 2019-08-20 | End: 2019-08-20 | Stop reason: HOSPADM

## 2019-08-20 RX ORDER — FENTANYL CITRATE 50 UG/ML
50 INJECTION, SOLUTION INTRAMUSCULAR; INTRAVENOUS EVERY 5 MIN PRN
Status: DISCONTINUED | OUTPATIENT
Start: 2019-08-20 | End: 2019-08-20 | Stop reason: HOSPADM

## 2019-08-20 RX ORDER — FENTANYL CITRATE 50 UG/ML
25 INJECTION, SOLUTION INTRAMUSCULAR; INTRAVENOUS EVERY 5 MIN PRN
Status: DISCONTINUED | OUTPATIENT
Start: 2019-08-20 | End: 2019-08-20 | Stop reason: HOSPADM

## 2019-08-20 RX ORDER — MORPHINE SULFATE 2 MG/ML
2 INJECTION, SOLUTION INTRAMUSCULAR; INTRAVENOUS EVERY 5 MIN PRN
Status: DISCONTINUED | OUTPATIENT
Start: 2019-08-20 | End: 2019-08-20 | Stop reason: HOSPADM

## 2019-08-20 RX ORDER — SODIUM CHLORIDE 0.9 % (FLUSH) 0.9 %
10 SYRINGE (ML) INJECTION EVERY 12 HOURS SCHEDULED
Status: DISCONTINUED | OUTPATIENT
Start: 2019-08-20 | End: 2019-08-20 | Stop reason: HOSPADM

## 2019-08-20 RX ORDER — PROPOFOL 10 MG/ML
INJECTION, EMULSION INTRAVENOUS PRN
Status: DISCONTINUED | OUTPATIENT
Start: 2019-08-20 | End: 2019-08-20 | Stop reason: SDUPTHER

## 2019-08-20 RX ORDER — SODIUM CHLORIDE, SODIUM LACTATE, POTASSIUM CHLORIDE, CALCIUM CHLORIDE 600; 310; 30; 20 MG/100ML; MG/100ML; MG/100ML; MG/100ML
INJECTION, SOLUTION INTRAVENOUS ONCE
Status: COMPLETED | OUTPATIENT
Start: 2019-08-20 | End: 2019-08-20

## 2019-08-20 RX ORDER — MORPHINE SULFATE 2 MG/ML
1 INJECTION, SOLUTION INTRAMUSCULAR; INTRAVENOUS EVERY 5 MIN PRN
Status: DISCONTINUED | OUTPATIENT
Start: 2019-08-20 | End: 2019-08-20 | Stop reason: HOSPADM

## 2019-08-20 RX ORDER — OXYCODONE HYDROCHLORIDE AND ACETAMINOPHEN 5; 325 MG/1; MG/1
2 TABLET ORAL PRN
Status: DISCONTINUED | OUTPATIENT
Start: 2019-08-20 | End: 2019-08-20 | Stop reason: HOSPADM

## 2019-08-20 RX ADMIN — PROPOFOL 10 MG: 10 INJECTION, EMULSION INTRAVENOUS at 11:27

## 2019-08-20 RX ADMIN — PROPOFOL 30 MG: 10 INJECTION, EMULSION INTRAVENOUS at 11:25

## 2019-08-20 RX ADMIN — SODIUM CHLORIDE, POTASSIUM CHLORIDE, SODIUM LACTATE AND CALCIUM CHLORIDE: 600; 310; 30; 20 INJECTION, SOLUTION INTRAVENOUS at 10:10

## 2019-08-20 RX ADMIN — LIDOCAINE HYDROCHLORIDE 40 MG: 10 INJECTION, SOLUTION INFILTRATION; PERINEURAL at 11:23

## 2019-08-20 RX ADMIN — PROPOFOL 10 MG: 10 INJECTION, EMULSION INTRAVENOUS at 11:29

## 2019-08-20 RX ADMIN — PROPOFOL 10 MG: 10 INJECTION, EMULSION INTRAVENOUS at 11:31

## 2019-08-20 RX ADMIN — SODIUM CHLORIDE, POTASSIUM CHLORIDE, SODIUM LACTATE AND CALCIUM CHLORIDE: 600; 310; 30; 20 INJECTION, SOLUTION INTRAVENOUS at 09:54

## 2019-08-20 RX ADMIN — FAMOTIDINE 20 MG: 10 INJECTION, SOLUTION INTRAVENOUS at 10:13

## 2019-08-20 RX ADMIN — PROPOFOL 100 MG: 10 INJECTION, EMULSION INTRAVENOUS at 11:23

## 2019-08-20 RX ADMIN — PROPOFOL 10 MG: 10 INJECTION, EMULSION INTRAVENOUS at 11:33

## 2019-08-20 ASSESSMENT — PAIN SCALES - GENERAL
PAINLEVEL_OUTOF10: 0

## 2019-08-20 ASSESSMENT — PAIN - FUNCTIONAL ASSESSMENT
PAIN_FUNCTIONAL_ASSESSMENT: 0-10
PAIN_FUNCTIONAL_ASSESSMENT: PREVENTS OR INTERFERES SOME ACTIVE ACTIVITIES AND ADLS

## 2019-08-20 ASSESSMENT — LIFESTYLE VARIABLES: SMOKING_STATUS: 1

## 2019-08-20 ASSESSMENT — PAIN DESCRIPTION - DESCRIPTORS: DESCRIPTORS: ACHING

## 2019-08-20 NOTE — OP NOTE
[address] ,  [address] [facility]  Phone: [phone] [address]  Phone: [phone]   [facility]:[phone]    Colonoscopy Procedure Note    Patient: Abiola Flynn  : 1960    Procedure: Colonoscopy with polypectomy (snare cautery)    Date:  2019     Endoscopist:  Heather Brady MD    Referring Physician:  Hailey Sage MD    Preoperative Diagnosis:  Colon cancer screening [Z12.11]    Postoperative Diagnosis:  See impression    Anesthesia: Anesthesia: MAC  ASA Class: 2  Mallampati: II (soft palate, uvula, fauces visible)    Indications: This is a 62y.o. year old male who presents today with screening for colon cancer. Procedure Details  Informed consent was obtained for the procedure, including sedation. Risks of perforation, hemorrhage, adverse drug reaction and aspiration were discussed. The patient was placed in the left lateral decubitus position. Based on the pre-procedure assessment, including review of the patient's medical history, medications, allergies, and review of systems, he had been deemed to be an appropriate candidate for sedation; he was therefore sedated with the medications listed below. The patient was monitored continuously with ECG tracing, pulse oximetry, blood pressure monitoring, and direct observations. rectal examination was performed. There were no external hemorrhoids, fissures or skin tags. The colonoscope was inserted into the rectum and advanced under direct vision to the cecum, which was identified by the ileocecal valve and appendiceal orifice. The right colon was examined twice as this increases polyp detection especially if other right colon polyps, older age, male, or martinez syndrome. When segments could not be distended with CO2 or air, it was filled/distended with water. The quality of the colonic preparation was good.   A careful inspection was made as the colonoscope was withdrawn, including a retroflexed view of the rectum; findings and interventions are described below. Appropriate photodocumentation Was Obtained. Findings: -polyp(s) - #1, 9 mm in size, located in the transverse colon, removed by snare cautery and retrieved for pathology  - internal hemorrhoids  - PREP: miralax  - Overall difficulty: mild in degree  - Abdominal pressure: yes - sigmoid  - Change in position: no  - Anesthesia issues: no  - Endocoff/Amplieye use: no    Specimens: Was Obtained    Complications:   None; patient tolerated the procedure well. Disposition:   PACU - hemodynamically stable. Withdrawal Time:  7 minutes    Impression:   -See post-procedure diagnoses. Recommendations:  -Await pathology. -Repeat colonoscopy in 3-5 years.         Heather Brady 8/20/19 11:41 AM

## 2019-08-20 NOTE — H&P
 Zoloft [Sertraline] Other (See Comments)     headaches     IMMUNIZATIONS     Immunization History   Administered Date(s) Administered    DT (pediatric) 05/21/2011    Influenza Virus Vaccine 10/15/2014    Pneumococcal Polysaccharide ([id number]) 10/15/2014    Tdap (Boostrix, Adacel) 05/11/2013, 07/13/2015, 04/11/2016     REVIEW OF SYSTEMS   See HPI for further details and pertinent postiives. Negative for the following:  Constitutional: Negative for weight change. Negative for appetite change and fatigue. HENT: Negative for nosebleeds, sore throat, mouth sores, and voice change. Respiratory: Negative for cough, choking and chest tightness. Cardiovascular: Negative for chest pain   Gastrointestinal: See HPI  Musculoskeletal: Negative for arthralgias. Skin: Negative for pallor. Neurological: Negative for weakness and light-headedness. Hematological: Negative for adenopathy. Does not bruise/bleed easily. Psychiatric/Behavioral: Negative for suicidal ideas. PHYSICAL EXAM   VITAL SIGNS:  Vitals:    08/20/19 0958   BP: 132/78   Pulse: 64   Resp: 20   Temp: 97.5 °F (36.4 °C)   SpO2: 93%     Wt Readings from Last 3 Encounters:   08/14/19 215 lb (97.5 kg)   08/06/19 215 lb (97.5 kg)   05/02/19 212 lb 12.8 oz (96.5 kg)     Constitutional: Well developed, Well nourished, No acute distress, Non-toxic appearance. HENT: Normocephalic, Atraumatic, Bilateral external ears normal, Oropharynx moist, No oral exudates, Nose normal.   Eyes: Conjunctiva normal, No discharge. Neck: Normal range of motion, No tenderness, Supple, No stridor. Lymphatic: No lymphadenopathy noted. Cardiovascular: Normal heart rate, Normal rhythm, No murmurs, No rubs, No gallops. Thorax & Lungs: Normal breath sounds, No respiratory distress, No wheezing, No chest tenderness. Abdomen: scars consistent with stated surgeries,  Soft NTND   Rectal:  Deferred. Skin: Warm, Dry, No erythema, No rash.    Back: No tenderness, No CVA

## 2019-08-26 ENCOUNTER — HOSPITAL ENCOUNTER (OUTPATIENT)
Dept: CT IMAGING | Age: 59
Discharge: HOME | End: 2019-08-26
Payer: MEDICARE

## 2019-08-26 DIAGNOSIS — R59.1 LYMPHADENOPATHY: ICD-10-CM

## 2019-08-26 PROCEDURE — 6360000004 HC RX CONTRAST MEDICATION: Performed by: FAMILY MEDICINE

## 2019-08-26 PROCEDURE — 74177 CT ABD & PELVIS W/CONTRAST: CPT

## 2019-08-26 RX ADMIN — IOHEXOL 50 ML: 240 INJECTION, SOLUTION INTRATHECAL; INTRAVASCULAR; INTRAVENOUS; ORAL at 12:34

## 2019-08-26 RX ADMIN — IOPAMIDOL 75 ML: 755 INJECTION, SOLUTION INTRAVENOUS at 12:33

## 2019-08-27 DIAGNOSIS — M87.059 AVASCULAR NECROSIS OF FEMORAL HEAD, UNSPECIFIED LATERALITY (HCC): Primary | ICD-10-CM

## 2019-08-30 ENCOUNTER — OFFICE VISIT (OUTPATIENT)
Dept: FAMILY MEDICINE CLINIC | Age: 59
End: 2019-08-30
Payer: MEDICARE

## 2019-08-30 VITALS
SYSTOLIC BLOOD PRESSURE: 130 MMHG | BODY MASS INDEX: 29.26 KG/M2 | HEART RATE: 75 BPM | OXYGEN SATURATION: 94 % | DIASTOLIC BLOOD PRESSURE: 82 MMHG | HEIGHT: 72 IN | WEIGHT: 216 LBS

## 2019-08-30 DIAGNOSIS — M87.00 AVASCULAR NECROSIS (HCC): ICD-10-CM

## 2019-08-30 DIAGNOSIS — S75.992A: ICD-10-CM

## 2019-08-30 DIAGNOSIS — Z72.0 TOBACCO ABUSE: Primary | ICD-10-CM

## 2019-08-30 DIAGNOSIS — F41.9 ANXIETY: ICD-10-CM

## 2019-08-30 LAB
APTT: 40.7 SEC (ref 26–36)
FIBRINOGEN: 387 MG/DL (ref 200–397)
HCT VFR BLD CALC: 45.4 % (ref 40.5–52.5)
HEMOGLOBIN: 15.3 G/DL (ref 13.5–17.5)
INR BLD: 0.96 (ref 0.86–1.14)
MCH RBC QN AUTO: 28.9 PG (ref 26–34)
MCHC RBC AUTO-ENTMCNC: 33.7 G/DL (ref 31–36)
MCV RBC AUTO: 85.8 FL (ref 80–100)
PDW BLD-RTO: 14.4 % (ref 12.4–15.4)
PLATELET # BLD: 180 K/UL (ref 135–450)
PMV BLD AUTO: 8.9 FL (ref 5–10.5)
PROTHROMBIN TIME: 10.9 SEC (ref 9.8–13)
RBC # BLD: 5.29 M/UL (ref 4.2–5.9)
WBC # BLD: 8.1 K/UL (ref 4–11)

## 2019-08-30 PROCEDURE — 99214 OFFICE O/P EST MOD 30 MIN: CPT | Performed by: FAMILY MEDICINE

## 2019-08-30 PROCEDURE — G8419 CALC BMI OUT NRM PARAM NOF/U: HCPCS | Performed by: FAMILY MEDICINE

## 2019-08-30 PROCEDURE — G8427 DOCREV CUR MEDS BY ELIG CLIN: HCPCS | Performed by: FAMILY MEDICINE

## 2019-08-30 PROCEDURE — 3017F COLORECTAL CA SCREEN DOC REV: CPT | Performed by: FAMILY MEDICINE

## 2019-08-30 PROCEDURE — 99406 BEHAV CHNG SMOKING 3-10 MIN: CPT | Performed by: FAMILY MEDICINE

## 2019-08-30 PROCEDURE — 4004F PT TOBACCO SCREEN RCVD TLK: CPT | Performed by: FAMILY MEDICINE

## 2019-08-30 PROCEDURE — 36415 COLL VENOUS BLD VENIPUNCTURE: CPT | Performed by: FAMILY MEDICINE

## 2019-08-30 RX ORDER — CLONAZEPAM 2 MG/1
2 TABLET ORAL PRN
COMMUNITY
End: 2020-02-11

## 2019-08-30 RX ORDER — NICOTINE 21 MG/24HR
1 PATCH, TRANSDERMAL 24 HOURS TRANSDERMAL EVERY 24 HOURS
Qty: 30 PATCH | Refills: 3 | Status: SHIPPED | OUTPATIENT
Start: 2019-08-30 | End: 2020-03-13

## 2019-08-30 RX ORDER — CELECOXIB 200 MG/1
200 CAPSULE ORAL 2 TIMES DAILY
COMMUNITY
Start: 2019-07-03 | End: 2020-02-11

## 2019-08-30 RX ORDER — ESCITALOPRAM OXALATE 20 MG/1
20 TABLET ORAL DAILY
Refills: 0 | COMMUNITY
Start: 2019-08-04 | End: 2019-08-30 | Stop reason: HOSPADM

## 2019-08-30 RX ORDER — DULOXETIN HYDROCHLORIDE 60 MG/1
60 CAPSULE, DELAYED RELEASE ORAL DAILY
COMMUNITY
End: 2019-08-30

## 2019-08-30 NOTE — PROGRESS NOTES
2019     Heidy Castorena (:  1960)is a 62 y.o. male, here for evaluation of the following medical concerns:    CC: avascular necrosis    HPI     Tobacco abuse  A few cigarettes per day, interested in trying to quit    Avascular necrosis  Picked up on abdominal CT  Avascular necrosis involving the femoral heads bilaterally     Anxiety  Somewhat controlled, On xanax    Review of Systems   Musculoskeletal:        + L knee pain, chronic   Psychiatric/Behavioral: The patient is nervous/anxious. Chronic     Prior to Visit Medications    Medication Sig Taking? Authorizing Provider   clonazePAM (KLONOPIN) 2 MG tablet Take 2 mg by mouth as needed. Yes Historical Provider, MD   nicotine (NICODERM CQ) 14 MG/24HR Place 1 patch onto the skin every 24 hours Yes Shelley Gallo MD   traZODone (DESYREL) 100 MG tablet Take 100 mg by mouth nightly  Yes Historical Provider, MD   methadone (DOLOPHINE) 5 MG tablet Take 5 mg by mouth every 4 hours as needed for Pain Yes Historical Provider, MD   ALPRAZolam Paul Chrissy) 0.5 MG tablet Take 1 mg by mouth 3 times daily as needed. . Yes Historical Provider, MD   celecoxib (CELEBREX) 200 MG capsule Take 200 mg by mouth 2 times daily  Historical Provider, MD   bisacodyl (DULCOLAX) 5 MG EC tablet Use as directed for colonoscopy prep  Patient not taking: Reported on 2019  Jaime Bruce MD        Social History     Tobacco Use    Smoking status: Current Every Day Smoker     Packs/day: 0.75     Years: 15.00     Pack years: 11.25     Types: Cigarettes    Smokeless tobacco: Never Used   Substance Use Topics    Alcohol use: No        Vitals:    19 1301   BP: 130/82   Site: Left Upper Arm   Position: Sitting   Cuff Size: Large Adult   Pulse: 75   SpO2: 94%   Weight: 216 lb (98 kg)   Height: 6' (1.829 m)  Comment: per pt report     Estimated body mass index is 29.29 kg/m² as calculated from the following:    Height as of this encounter: 6' (1.829 m).     Weight

## 2019-09-04 ENCOUNTER — TELEPHONE (OUTPATIENT)
Dept: FAMILY MEDICINE CLINIC | Age: 59
End: 2019-09-04

## 2019-09-04 NOTE — TELEPHONE ENCOUNTER
Please call patient to discuss a letter he wants sent regarding his referral for orthopedic.  He is wanting to get coverage under his Workers Comp claim for this referral.      Last seen:  8/30/19

## 2019-09-04 NOTE — TELEPHONE ENCOUNTER
Patient called back again and wanted a print out of his CT results so that he can turn into workers comp. He wants to know what we can do to help him move this process along. He is inquiring about the referral to ortho. I told him I wasn't knowledgeable of workers comp and that I didn't think we handled that at this office. I printed out his results and he will  tomorrow, not sure what else we can do for him?

## 2019-09-10 DIAGNOSIS — R79.1 PROTHROMBIN TIME ABNORMAL: Primary | ICD-10-CM

## 2019-09-16 ENCOUNTER — TELEPHONE (OUTPATIENT)
Dept: FAMILY MEDICINE CLINIC | Age: 59
End: 2019-09-16

## 2019-09-18 NOTE — TELEPHONE ENCOUNTER
Spoke with pt. I gave him apt info for his consult with Dr. Marilyn Lambert - 10/4 @ 11:00 in [facility]). Pt has not yet made apt with Ortho but it's on his radar and he will do it after he consults with hematology.

## 2019-09-27 ENCOUNTER — TELEPHONE (OUTPATIENT)
Dept: GASTROENTEROLOGY | Age: 59
End: 2019-09-27

## 2019-09-27 DIAGNOSIS — R19.7 DIARRHEA, UNSPECIFIED TYPE: Primary | ICD-10-CM

## 2019-10-01 ENCOUNTER — TELEPHONE (OUTPATIENT)
Dept: SURGERY | Age: 59
End: 2019-10-01

## 2019-10-01 ENCOUNTER — TELEPHONE (OUTPATIENT)
Dept: GASTROENTEROLOGY | Age: 59
End: 2019-10-01

## 2019-10-03 ENCOUNTER — TELEPHONE (OUTPATIENT)
Dept: FAMILY MEDICINE CLINIC | Age: 59
End: 2019-10-03

## 2019-10-03 NOTE — TELEPHONE ENCOUNTER
Patient requests a return call to discuss his pain and where to go from here. Stated Dr. Magana Escort only on ordering test and he is totally frustrated. Please call.     Last seen 8/30/19

## 2019-11-15 ENCOUNTER — OFFICE VISIT (OUTPATIENT)
Dept: FAMILY MEDICINE CLINIC | Age: 59
End: 2019-11-15
Payer: MEDICARE

## 2019-11-15 VITALS
OXYGEN SATURATION: 97 % | TEMPERATURE: 97.7 F | WEIGHT: 221 LBS | HEART RATE: 72 BPM | BODY MASS INDEX: 29.97 KG/M2 | DIASTOLIC BLOOD PRESSURE: 74 MMHG | SYSTOLIC BLOOD PRESSURE: 128 MMHG

## 2019-11-15 DIAGNOSIS — M87.059 AVASCULAR NECROSIS OF BONE OF HIP, UNSPECIFIED LATERALITY (HCC): ICD-10-CM

## 2019-11-15 DIAGNOSIS — Z72.0 TOBACCO ABUSE: Primary | ICD-10-CM

## 2019-11-15 DIAGNOSIS — F41.9 ANXIETY: ICD-10-CM

## 2019-11-15 PROCEDURE — 3017F COLORECTAL CA SCREEN DOC REV: CPT | Performed by: FAMILY MEDICINE

## 2019-11-15 PROCEDURE — G8484 FLU IMMUNIZE NO ADMIN: HCPCS | Performed by: FAMILY MEDICINE

## 2019-11-15 PROCEDURE — 99214 OFFICE O/P EST MOD 30 MIN: CPT | Performed by: FAMILY MEDICINE

## 2019-11-15 PROCEDURE — 4004F PT TOBACCO SCREEN RCVD TLK: CPT | Performed by: FAMILY MEDICINE

## 2019-11-15 PROCEDURE — G8427 DOCREV CUR MEDS BY ELIG CLIN: HCPCS | Performed by: FAMILY MEDICINE

## 2019-11-15 PROCEDURE — G8419 CALC BMI OUT NRM PARAM NOF/U: HCPCS | Performed by: FAMILY MEDICINE

## 2019-11-15 PROCEDURE — 99406 BEHAV CHNG SMOKING 3-10 MIN: CPT | Performed by: FAMILY MEDICINE

## 2019-11-15 RX ORDER — NICOTINE 21 MG/24HR
1 PATCH, TRANSDERMAL 24 HOURS TRANSDERMAL EVERY 24 HOURS
Qty: 30 PATCH | Refills: 3 | Status: SHIPPED | OUTPATIENT
Start: 2019-11-15 | End: 2020-02-28

## 2019-12-18 ENCOUNTER — TELEPHONE (OUTPATIENT)
Dept: ORTHOPEDIC SURGERY | Age: 59
End: 2019-12-18

## 2020-01-13 ENCOUNTER — OFFICE VISIT (OUTPATIENT)
Dept: ORTHOPEDIC SURGERY | Age: 60
End: 2020-01-13
Payer: MEDICARE

## 2020-01-13 VITALS — BODY MASS INDEX: 29.92 KG/M2 | WEIGHT: 220.9 LBS | HEIGHT: 72 IN

## 2020-01-13 PROCEDURE — 99213 OFFICE O/P EST LOW 20 MIN: CPT | Performed by: ORTHOPAEDIC SURGERY

## 2020-01-13 PROCEDURE — G8427 DOCREV CUR MEDS BY ELIG CLIN: HCPCS | Performed by: ORTHOPAEDIC SURGERY

## 2020-01-13 PROCEDURE — G8419 CALC BMI OUT NRM PARAM NOF/U: HCPCS | Performed by: ORTHOPAEDIC SURGERY

## 2020-01-13 PROCEDURE — G8484 FLU IMMUNIZE NO ADMIN: HCPCS | Performed by: ORTHOPAEDIC SURGERY

## 2020-01-13 PROCEDURE — 4004F PT TOBACCO SCREEN RCVD TLK: CPT | Performed by: ORTHOPAEDIC SURGERY

## 2020-01-13 PROCEDURE — 3017F COLORECTAL CA SCREEN DOC REV: CPT | Performed by: ORTHOPAEDIC SURGERY

## 2020-01-13 RX ORDER — MELOXICAM 15 MG/1
15 TABLET ORAL DAILY
Qty: 30 TABLET | Refills: 3 | Status: SHIPPED | OUTPATIENT
Start: 2020-01-13 | End: 2020-02-11

## 2020-01-13 NOTE — PROGRESS NOTES
MD Renato Machuca, [place]         Orthopaedic Surgery and Sports Medicine      Patient Name: Nixon Stapleton  YOB: 1960  Patient's PCP is Marilou Johnson MD    SUBJECTIVE  Chief Complaint:  Knee Pain (left knee, known arthritis)      History of Present Illness:  Nixon Stapleton is a 61 y.o. male here regarding left knee pain. This gentleman who apparently has been off work because of his back but is seeing us today because of his left knee pain. He was previously seen 11/6/2016 and given a diagnosis of primary osteoarthritis of both knees. He agrees today that he has bilateral knee problems but he states his left is significantly worse than the right. There was not a history of injury reported. Today he stated that his low back problem or lumbar spine problem is a Worker's Compensation claim. He also stated that he does not have a Worker's Compensation claim for his left knee. The patient is currently ambulating independently. History of swelling: Yes  History of \"giving way\": Yes  History of instability: Yes  History of popping and/or catching: Yes    The pain is located medial.   The patient describes the symptoms as aching and sharp. He rates pain at 8/10. Symptoms improve with rest, ice. The symptoms are made worse with activity, stair climbing, kneeling, deep knee bending. Sleep pattern is affected by the chief complaint: Yes    The patient has not had PT. The patient has not had an injection. The patient has taken NSAIDs. The patient is not working. They states that he normally works as a  but he has not been working because of his lumbar spine problems. He is hoping to return to work.       Pain Assessment:  Pain Assessment  Location of Pain: Knee  Location Modifiers: Left  Severity of Pain: 8  Quality of Pain: Sharp, Throbbing  Duration of Pain: Persistent  Frequency of Pain: Intermittent  Aggravating Factors: Standing, Walking, Stairs  Limiting Behavior: Yes  Relieving Factors: Rest  Result of Injury: Yes  Work-Related Injury: No  Are there other pain locations you wish to document?: No    Past Medical History:  Past Medical History:   Diagnosis Date    Arthritis     Asthma     Depression     Fractured pelvis (HCC)     Fractured rib     Hypertension     Irregular heartbeat     Pneumonia     Pneumothorax, left     Sleep apnea     did not tolerate CPAP       Past Surgical History:  Past Surgical History:   Procedure Laterality Date    CARDIAC SURGERY      cardiac cath    COLONOSCOPY N/A 8/20/2019    COLONOSCOPY POLYPECTOMY REMOVAL HOT BIOPSY performed by Jono Sanchez MD at [address] Left     ulna    LAMINECTOMY      2 partial laminectomy    LUMBAR FUSION      LUMBAR SPINE SURGERY      pain stimulator placed in lower back left side L4-L5 then removed       Allergies: Allergies   Allergen Reactions    Demerol Hcl [Meperidine] Anaphylaxis    Gabapentin Shortness Of Breath    Zoloft [Sertraline] Other (See Comments)     headaches       Medications:  Current Outpatient Medications   Medication Sig Dispense Refill    meloxicam (MOBIC) 15 MG tablet Take 1 tablet by mouth daily 30 tablet 3    nicotine (NICODERM CQ) 21 MG/24HR Place 1 patch onto the skin every 24 hours 30 patch 3    celecoxib (CELEBREX) 200 MG capsule Take 200 mg by mouth 2 times daily      clonazePAM (KLONOPIN) 2 MG tablet Take 2 mg by mouth as needed.       nicotine (NICODERM CQ) 14 MG/24HR Place 1 patch onto the skin every 24 hours 30 patch 3    bisacodyl (DULCOLAX) 5 MG EC tablet Use as directed for colonoscopy prep 4 tablet 0    traZODone (DESYREL) 100 MG tablet Take 100 mg by mouth nightly   0    methadone (DOLOPHINE) 5 MG tablet Take 5 mg by mouth every 4 hours as needed for Pain      ALPRAZolam Pamela Roger) 0.5 MG tablet Take 1 mg by mouth 3 times daily as needed. .       No current facility-administered medications for this visit. Review of Systems:  Cande Kwon's review of systems has been performed by intake and observation. All past and current ROS forms have been scanned into the medical record. She has been instructed to contact her primary care provider regarding ROS issues if not already being addressed at this time. There are no recent changes. The most recent ROS was scanned into media on 1/13/2020      OBJECTIVE  PHYSICAL EXAM  Vital Signs: There were no vitals filed for this visit. Body mass index is 29.95 kg/m². General Exam:   Constitutional: Patient is adequately groomed with no evidence of malnutrition  DTRs: Deep tendon reflexes are intact  Mental Status: The patient is oriented to time, place and person. The patient's mood and affect are appropriate. Lymphatic: The lymphatic examination bilaterally reveals all areas to be without enlargement or induration. Vascular: Examination reveals no swelling or calf tenderness. Peripheral pulses are palpable and 2+. Left Knee Examination  Inspection:   Knee alignment: significant varus  moderate swelling noted. No erythema or ecchymosis. Skin is intact with no cellulitis, rashes, ulcerations, lymphedema or cutaneous lesions noted. Gait: abnormal and antalgic. The patient can bear weight on the extremity. Today his gait is significantly pathologic. He cannot fully extend his left knee. Palpation: mild tenderness to palpation on the medial joint line. a moderate effusion noted. Range of Motion: He lacks about 10 degrees of full extension flexes to about 110 degrees in the left knee. With standing he has significant varus deformity.     Special Tests:  Lachman test: negative       Anterior drawer: negative       Posterior drawer: negative       Juan Antonio's test: negative for a pop but does have discomfort Varus laxity at 30 degrees: negative       Valgus laxity at 30 degrees: positive    Strength: No gross motor weakness noted. All muscle groups appear to be functioning. Motor exam of the lower extremities show quadriceps, hamstrings, foot dorsiflexion and plantarflexion grossly intact. Neurologic & vascular: Sensation to both feet is grossly intact to light touch. The bilateral lower extremities are warm and well-perfused with brisk capillary refill. Additional Examinations:  Right lower Extremity: Examination of the left lower extremity does not show any tenderness, deformity or injury. Range of motion is unremarkable. There is no gross instability. There are no rashes, ulcerations or lesions. Strength and tone are normal.      DIAGNOSTICS  Xrays obtained in office today: Yes  Xrays reviewed today: Yes  Four views of the left knee   Fracture: No  Dislocation: No  Knee joint arthritis: severe  Medial compartment: severe  Lateral compartment: moderate  Patellofemoral compartment: mild  Patellar tilt: No  Varus deformity: Yes  Valgus deformity:No           ASSESSMENT (Medical Decision Making)    Malka Acevedo is a 61 y.o. male with the following diagnosis: Moderately severe degenerative osteoarthritis of both knees significantly worse on the left. ICD-10-CM    1. Left knee pain, unspecified chronicity M25.562 XR KNEE LEFT (MIN 4 VIEWS)     meloxicam (MOBIC) 15 MG tablet       His overall course is worsening despite conservative treatment      PLAN (Medical Decision Making)  Office Procedures:  Orders Placed This Encounter   Procedures    XR KNEE LEFT (MIN 4 VIEWS)     Standing Status:   Future     Number of Occurrences:   1     Standing Expiration Date:   1/9/2021       Treatment Plan:    I discussed the diagnosis and treatment options with Malka Acevedo today. Today we discussed his options of treatment. He is currently not taking any type of arthritic or anti-inflammatory medication.   I that this office note is accurate.

## 2020-02-03 ENCOUNTER — OFFICE VISIT (OUTPATIENT)
Dept: ORTHOPEDIC SURGERY | Age: 60
End: 2020-02-03
Payer: MEDICARE

## 2020-02-03 VITALS — WEIGHT: 220.9 LBS | BODY MASS INDEX: 29.92 KG/M2 | HEIGHT: 72 IN

## 2020-02-03 PROCEDURE — 3017F COLORECTAL CA SCREEN DOC REV: CPT | Performed by: ORTHOPAEDIC SURGERY

## 2020-02-03 PROCEDURE — 99213 OFFICE O/P EST LOW 20 MIN: CPT | Performed by: ORTHOPAEDIC SURGERY

## 2020-02-03 PROCEDURE — G8484 FLU IMMUNIZE NO ADMIN: HCPCS | Performed by: ORTHOPAEDIC SURGERY

## 2020-02-03 PROCEDURE — G8419 CALC BMI OUT NRM PARAM NOF/U: HCPCS | Performed by: ORTHOPAEDIC SURGERY

## 2020-02-03 PROCEDURE — G8427 DOCREV CUR MEDS BY ELIG CLIN: HCPCS | Performed by: ORTHOPAEDIC SURGERY

## 2020-02-03 PROCEDURE — 4004F PT TOBACCO SCREEN RCVD TLK: CPT | Performed by: ORTHOPAEDIC SURGERY

## 2020-02-03 NOTE — PROGRESS NOTES
him started on meloxicam 15 mg daily which he reports has not helped much he does have an [employer] hinged brace that he has been wearing which helps somewhat. He is also in pain management and takes methadone as well as Xanax. Pain Assessment:  Pain Assessment  Location of Pain: Knee  Location Modifiers: Left  Severity of Pain: 8  Quality of Pain: Aching  Duration of Pain: Persistent  Frequency of Pain: Constant  Aggravating Factors: Stairs, Standing, Kneeling, Walking  Limiting Behavior: Yes  Relieving Factors: Rest  Result of Injury: No  Work-Related Injury: No  Are there other pain locations you wish to document?: No    Past Medical History:  Past Medical History:   Diagnosis Date    Arthritis     Asthma     Depression     Fractured pelvis (HCC)     Fractured rib     Hypertension     Irregular heartbeat     Pneumonia     Pneumothorax, left     Sleep apnea     did not tolerate CPAP       Past Surgical History:  Past Surgical History:   Procedure Laterality Date    CARDIAC SURGERY      cardiac cath    COLONOSCOPY N/A 8/20/2019    COLONOSCOPY POLYPECTOMY REMOVAL HOT BIOPSY performed by Woo Edge MD at [facility] [address] Left     ulna    LAMINECTOMY      2 partial laminectomy    LUMBAR FUSION      LUMBAR SPINE SURGERY      pain stimulator placed in lower back left side L4-L5 then removed       Allergies: Allergies   Allergen Reactions    Demerol Hcl [Meperidine] Anaphylaxis    Gabapentin Shortness Of Breath    Zoloft [Sertraline] Other (See Comments)     headaches       Medications:  Current Outpatient Medications   Medication Sig Dispense Refill    meloxicam (MOBIC) 15 MG tablet Take 1 tablet by mouth daily 30 tablet 3    nicotine (NICODERM CQ) 21 MG/24HR Place 1 patch onto the skin every 24 hours 30 patch 3    celecoxib (CELEBREX) 200 MG capsule Take 200 mg by mouth 2 times daily      clonazePAM (KLONOPIN) 2 MG tablet Take 2 mg by mouth as needed.      

## 2020-02-06 ENCOUNTER — TELEPHONE (OUTPATIENT)
Dept: FAMILY MEDICINE CLINIC | Age: 60
End: 2020-02-06

## 2020-02-06 NOTE — LETTER
[address]  Phone: [phone]  Fax: [phone]    Marilou Johnson MD        February 7, 2020     Patient: Nixon Stapleton   YOB: 1960   Date of Visit: 2/6/2020       To Whom it May Concern:    Due to patient having Avascular Necrosis in both hips and Arthritis in both knees. Patient is instructed not to lift over 20 lbs at work. If you have any questions or concerns, please don't hesitate to call.     Sincerely,         Marilou Johnson MD

## 2020-02-07 NOTE — TELEPHONE ENCOUNTER
I spoke with pt and told him that I would write a letter saying, \"Due to avascular necrosis in hips and arthritis in knees, the pt is instructed not to lift over 20 lbs at work. \"

## 2020-02-11 ENCOUNTER — HOSPITAL ENCOUNTER (EMERGENCY)
Age: 60
Discharge: HOME | End: 2020-02-11
Payer: MEDICARE

## 2020-02-11 VITALS
DIASTOLIC BLOOD PRESSURE: 112 MMHG | SYSTOLIC BLOOD PRESSURE: 150 MMHG | RESPIRATION RATE: 18 BRPM | OXYGEN SATURATION: 96 % | WEIGHT: 210 LBS | TEMPERATURE: 98.5 F | HEART RATE: 72 BPM | BODY MASS INDEX: 28.47 KG/M2

## 2020-02-11 PROCEDURE — 99282 EMERGENCY DEPT VISIT SF MDM: CPT

## 2020-02-11 ASSESSMENT — PAIN SCALES - GENERAL: PAINLEVEL_OUTOF10: 9

## 2020-02-13 ENCOUNTER — HOSPITAL ENCOUNTER (OUTPATIENT)
Dept: MRI IMAGING | Age: 60
Discharge: HOME | End: 2020-02-13
Payer: MEDICARE

## 2020-02-13 ENCOUNTER — HOSPITAL ENCOUNTER (OUTPATIENT)
Dept: GENERAL RADIOLOGY | Age: 60
Discharge: HOME | End: 2020-02-13
Payer: MEDICARE

## 2020-02-13 PROCEDURE — 72070 X-RAY EXAM THORAC SPINE 2VWS: CPT

## 2020-02-13 PROCEDURE — 73721 MRI JNT OF LWR EXTRE W/O DYE: CPT

## 2020-02-13 NOTE — ED PROVIDER NOTES
Allergen Reactions    Demerol Hcl [Meperidine] Anaphylaxis    Gabapentin Shortness Of Breath    Zoloft [Sertraline] Other (See Comments)     headaches       REVIEW OF SYSTEMS  6 systems reviewed, pertinent positives per HPI otherwise noted to be negative    PHYSICAL EXAM  BP (!) 150/112   Pulse 72   Temp 98.5 °F (36.9 °C) (Oral)   Resp 18   Wt 210 lb (95.3 kg)   SpO2 96%   BMI 28.47 kg/m²   GENERAL APPEARANCE: Awake and alert. Cooperative. No acute distress. HEAD: Normocephalic. Atraumatic. EYES: No outward signs of trauma. No obvious abnormality. EOM's grossly intact. ENT: Mucous membranes are moist.   NECK: Supple. Normal ROM. CHEST: Equal symmetric chest rise. LUNGS: Breathing is unlabored. No obvious respiratory distress. Abdomen: Nondistended  EXTREMITIES: MAEE. No acute deformities. Mild global tenderness noted to the left knee without deformity. Full range of motion. Other extremities are atraumatic and nontender. SKIN: Warm and dry. NEUROLOGICAL: Alert and oriented. Strength is 5/5 in all extremities and sensation is intact. PSYCH: Normal Affect  Labs:    No results found for this visit on 02/11/20. RADIOLOGY  No results found. ED COURSE/MDM  Patient seen and evaluated here in the ER with the supervising physician available for consultation. Patient presented to the emergency room today with complaints of chronic pain and needing crutches. Patient was given a prescription for crutches. Patient is having surgery soon, I did not feel that further work-up is necessary. Patient did ask for pain medication however he is on methadone and I explained to him that I cannot give him any pain medication. He was discharged home in good condition. Patient was given scripts for the following medications. I counseled patient how to take these medications. Discharge Medication List as of 2/11/2020  7:10 PM              CLINICAL IMPRESSION  1.  Acute pain of left knee        Blood

## 2020-02-18 ENCOUNTER — HOSPITAL ENCOUNTER (EMERGENCY)
Age: 60
Discharge: HOME | End: 2020-02-18
Payer: MEDICARE

## 2020-02-18 VITALS
DIASTOLIC BLOOD PRESSURE: 78 MMHG | TEMPERATURE: 97.7 F | SYSTOLIC BLOOD PRESSURE: 115 MMHG | BODY MASS INDEX: 30.1 KG/M2 | OXYGEN SATURATION: 95 % | WEIGHT: 215 LBS | HEIGHT: 71 IN | RESPIRATION RATE: 16 BRPM | HEART RATE: 68 BPM

## 2020-02-18 PROCEDURE — 99282 EMERGENCY DEPT VISIT SF MDM: CPT

## 2020-02-18 ASSESSMENT — PAIN SCALES - GENERAL: PAINLEVEL_OUTOF10: 10

## 2020-02-18 NOTE — ED NOTES
Pt left ED room without signing d/c papers. Pt assessed per Deepak Aburto NP.      Hank Marquez, DINHN  18/39/66 [id number]

## 2020-02-21 ENCOUNTER — OFFICE VISIT (OUTPATIENT)
Dept: FAMILY MEDICINE CLINIC | Age: 60
End: 2020-02-21
Payer: MEDICARE

## 2020-02-21 VITALS
WEIGHT: 215 LBS | OXYGEN SATURATION: 96 % | SYSTOLIC BLOOD PRESSURE: 116 MMHG | HEART RATE: 61 BPM | BODY MASS INDEX: 29.99 KG/M2 | DIASTOLIC BLOOD PRESSURE: 76 MMHG | TEMPERATURE: 97.7 F

## 2020-02-21 PROCEDURE — 3017F COLORECTAL CA SCREEN DOC REV: CPT | Performed by: FAMILY MEDICINE

## 2020-02-21 PROCEDURE — G8484 FLU IMMUNIZE NO ADMIN: HCPCS | Performed by: FAMILY MEDICINE

## 2020-02-21 PROCEDURE — G8419 CALC BMI OUT NRM PARAM NOF/U: HCPCS | Performed by: FAMILY MEDICINE

## 2020-02-21 PROCEDURE — G8427 DOCREV CUR MEDS BY ELIG CLIN: HCPCS | Performed by: FAMILY MEDICINE

## 2020-02-21 PROCEDURE — 99214 OFFICE O/P EST MOD 30 MIN: CPT | Performed by: FAMILY MEDICINE

## 2020-02-21 PROCEDURE — 4004F PT TOBACCO SCREEN RCVD TLK: CPT | Performed by: FAMILY MEDICINE

## 2020-02-21 NOTE — LETTER
[address]  Phone: [phone]  Fax: [phone]    Khalida Szymanski MD        February 21, 2020     Patient: [employer]   YOB: 1960   Date of Visit: 2/21/2020       To Whom it May Concern:    Aminata Katz was seen in my clinic on 2/21/2020. He may not return back to work until he has been evaluated after his pending surgery. If you have any questions or concerns, please don't hesitate to call.     Sincerely,         Khalida Szymanski MD

## 2020-02-22 NOTE — ED PROVIDER NOTES
This patient was not seen and evaluated by the attending physician. CHIEF COMPLAINT  Knee Pain (chronic x6 months bilateral knee pain unaware of new injury)      HISTORY OF PRESENT ILLNESS  Marcy Vasquez is a 61 y.o. male who presents to the ED for evaluation of chronic bilateral knee pain. Patient presented to the ER today stating that he would like knee braces. Patient states that he has a prescription from his orthopedic doctor for knee braces. Patient has no new injuries or complaints. He is here for further evaluation    LOCATION: Bilateral knees  QUALITY: Ache  SEVERITY: 10 out of 10  DURATION: Chronic  MODIFYING FACTORS: Worse with movement or ambulation    No other complaints, modifying factors or associated symptoms. Nursing notes reviewed.    Past Medical History:   Diagnosis Date    Arthritis     Asthma     Depression     Fractured pelvis (HCC)     Fractured rib     Hypertension     Irregular heartbeat     Pneumonia     Pneumothorax, left     Sleep apnea     did not tolerate CPAP     Past Surgical History:   Procedure Laterality Date    CARDIAC SURGERY      cardiac cath    COLONOSCOPY N/A 8/20/2019    COLONOSCOPY POLYPECTOMY REMOVAL HOT BIOPSY performed by Fantasma Castelan MD at [address] Left     ulna    LAMINECTOMY      2 partial laminectomy    LUMBAR FUSION      LUMBAR SPINE SURGERY      pain stimulator placed in lower back left side L4-L5 then removed     Family History   Problem Relation Age of Onset    Cancer Father     Stroke Sister     Cancer Brother      Social History     Socioeconomic History    Marital status:      Spouse name: Not on file    Number of children: Not on file    Years of education: Not on file    Highest education level: Not on file   Occupational History    Not on file   Social Needs    Financial resource strain: Not on file    Food insecurity:     Worry: Not on file     Inability: Not on file   Phobious needs:     Medical: Not on file     Non-medical: Not on file   Tobacco Use    Smoking status: Current Every Day Smoker     Packs/day: 0.75     Years: 15.00     Pack years: 11.25     Types: Cigarettes    Smokeless tobacco: Never Used   Substance and Sexual Activity    Alcohol use: No    Drug use: Not Currently     Comment: none for 30 years    Sexual activity: Not on file   Lifestyle    Physical activity:     Days per week: Not on file     Minutes per session: Not on file    Stress: Not on file   Relationships    Social connections:     Talks on phone: Not on file     Gets together: Not on file     Attends Religion service: Not on file     Active member of club or organization: Not on file     Attends meetings of clubs or organizations: Not on file     Relationship status: Not on file    Intimate partner violence:     Fear of current or ex partner: Not on file     Emotionally abused: Not on file     Physically abused: Not on file     Forced sexual activity: Not on file   Other Topics Concern    Not on file   Social History Narrative    ** Merged History Encounter **          No current facility-administered medications for this encounter. Current Outpatient Medications   Medication Sig Dispense Refill    nicotine (NICODERM CQ) 21 MG/24HR Place 1 patch onto the skin every 24 hours 30 patch 3    nicotine (NICODERM CQ) 14 MG/24HR Place 1 patch onto the skin every 24 hours 30 patch 3    methadone (DOLOPHINE) 5 MG tablet Take 5 mg by mouth every 4 hours as needed for Pain      ALPRAZolam (XANAX) 0.5 MG tablet Take 1 mg by mouth 3 times daily as needed.  Myra Stanley bisacodyl (DULCOLAX) 5 MG EC tablet Use as directed for colonoscopy prep 4 tablet 0     Allergies   Allergen Reactions    Demerol Hcl [Meperidine] Anaphylaxis    Gabapentin Shortness Of Breath    Zoloft [Sertraline] Other (See Comments)     headaches       REVIEW OF SYSTEMS  6 systems reviewed, pertinent positives per HPI otherwise noted to be negative    PHYSICAL EXAM  /78   Pulse 68   Temp 97.7 °F (36.5 °C) (Oral)   Resp 16   Ht 5' 11\" (1.803 m)   Wt 215 lb (97.5 kg)   SpO2 95%   BMI 29.99 kg/m²   GENERAL APPEARANCE: Awake and alert. Cooperative. No acute distress. HEAD: Normocephalic. Atraumatic. EYES: No outward signs of trauma. No obvious abnormality. EOM's grossly intact. ENT: Mucous membranes are moist.   NECK: Supple. Normal ROM. CHEST: Equal symmetric chest rise. LUNGS: Breathing is unlabored. No obvious respiratory distress. Abdomen: Nondistended  EXTREMITIES: MAEE. No acute deformities. SKIN: Warm and dry. NEUROLOGICAL: Alert and oriented. Strength is 5/5 in all extremities and sensation is intact. PSYCH: Normal Affect  Labs:    No results found for this visit on 02/18/20. RADIOLOGY  No results found. ED COURSE/MDM  Patient seen and evaluated here in the ER with the supervising physician available for consultation. Patient presented to the emergency department today with a prescription for knee braces, he states that he would like knee braces. I explained him that we do not even carry them in the ER and that I could not give them to him even if I wanted to however he states that if he gets them anywhere by the hospital he is going to have to pay for them. Unfortunately I do not have any braces to use and recommended he call his orthopedic surgeon. Patient discharged home in good condition. Patient was given scripts for the following medications. I counseled patient how to take these medications. Discharge Medication List as of 2/18/2020  1:21 PM              CLINICAL IMPRESSION  1. Chronic pain of both knees        Blood pressure 115/78, pulse 68, temperature 97.7 °F (36.5 °C), temperature source Oral, resp. rate 16, height 5' 11\" (1.803 m), weight 215 lb (97.5 kg), SpO2 95 %. DISPOSITION  Patient was discharged to home in good condition.        MICHELLE Cary - CNP  02/22/20 0007

## 2020-02-23 ENCOUNTER — APPOINTMENT (OUTPATIENT)
Dept: CT IMAGING | Age: 60
End: 2020-02-23
Payer: MEDICARE

## 2020-02-23 ENCOUNTER — HOSPITAL ENCOUNTER (EMERGENCY)
Age: 60
Discharge: HOME | End: 2020-02-24
Payer: MEDICARE

## 2020-02-23 VITALS
TEMPERATURE: 98.5 F | WEIGHT: 215 LBS | DIASTOLIC BLOOD PRESSURE: 98 MMHG | BODY MASS INDEX: 29.99 KG/M2 | RESPIRATION RATE: 16 BRPM | OXYGEN SATURATION: 95 % | HEART RATE: 73 BPM | SYSTOLIC BLOOD PRESSURE: 142 MMHG

## 2020-02-23 PROCEDURE — 70486 CT MAXILLOFACIAL W/O DYE: CPT

## 2020-02-23 PROCEDURE — 99284 EMERGENCY DEPT VISIT MOD MDM: CPT

## 2020-02-23 PROCEDURE — 70450 CT HEAD/BRAIN W/O DYE: CPT

## 2020-02-23 ASSESSMENT — PAIN SCALES - GENERAL: PAINLEVEL_OUTOF10: 9

## 2020-02-24 ENCOUNTER — TELEPHONE (OUTPATIENT)
Dept: FAMILY MEDICINE CLINIC | Age: 60
End: 2020-02-24
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
## 2020-02-25 NOTE — TELEPHONE ENCOUNTER
Patient called back and wanted to know if he could come in and get Dr. Kati Arrieta to take a pictures of his hips and knees. Please advise.

## 2020-02-26 ENCOUNTER — HOSPITAL ENCOUNTER (OUTPATIENT)
Dept: MRI IMAGING | Age: 60
Discharge: HOME | End: 2020-02-26
Payer: MEDICARE

## 2020-02-26 PROCEDURE — 72148 MRI LUMBAR SPINE W/O DYE: CPT

## 2020-02-27 ENCOUNTER — TELEPHONE (OUTPATIENT)
Dept: FAMILY MEDICINE CLINIC | Age: 60
End: 2020-02-27

## 2020-02-28 ENCOUNTER — OFFICE VISIT (OUTPATIENT)
Dept: FAMILY MEDICINE CLINIC | Age: 60
End: 2020-02-28
Payer: MEDICARE

## 2020-02-28 VITALS
OXYGEN SATURATION: 98 % | TEMPERATURE: 98.1 F | WEIGHT: 215 LBS | BODY MASS INDEX: 29.99 KG/M2 | DIASTOLIC BLOOD PRESSURE: 62 MMHG | SYSTOLIC BLOOD PRESSURE: 105 MMHG | HEART RATE: 65 BPM

## 2020-02-28 PROCEDURE — G8484 FLU IMMUNIZE NO ADMIN: HCPCS | Performed by: FAMILY MEDICINE

## 2020-02-28 PROCEDURE — 99214 OFFICE O/P EST MOD 30 MIN: CPT | Performed by: FAMILY MEDICINE

## 2020-02-28 PROCEDURE — G8419 CALC BMI OUT NRM PARAM NOF/U: HCPCS | Performed by: FAMILY MEDICINE

## 2020-02-28 PROCEDURE — 4004F PT TOBACCO SCREEN RCVD TLK: CPT | Performed by: FAMILY MEDICINE

## 2020-02-28 PROCEDURE — 99406 BEHAV CHNG SMOKING 3-10 MIN: CPT | Performed by: FAMILY MEDICINE

## 2020-02-28 PROCEDURE — G8427 DOCREV CUR MEDS BY ELIG CLIN: HCPCS | Performed by: FAMILY MEDICINE

## 2020-02-28 PROCEDURE — 3017F COLORECTAL CA SCREEN DOC REV: CPT | Performed by: FAMILY MEDICINE

## 2020-02-28 NOTE — PROGRESS NOTES
2020     Zach Duke (:  1960)is a 61 y.o. male, here for evaluation of the following medical concerns:    CC: tobacco abuse    HPI     Tobacco abuse  Using the patches, Had a cigarette the other day but for the most part has stopped    Avascular necrosis  Has seen ortho     Anxiety  Not completely controlled, taking xanax    Review of Systems   Musculoskeletal:        L and R hip pain chronic    Psychiatric/Behavioral: The patient is nervous/anxious. Anxiety partially controlled on xanax     Prior to Visit Medications    Medication Sig Taking? Authorizing Provider   nicotine (NICODERM CQ) 14 MG/24HR Place 1 patch onto the skin every 24 hours Yes Laura Stanley MD   bisacodyl (DULCOLAX) 5 MG EC tablet Use as directed for colonoscopy prep Yes Giana Benoit MD   methadone (DOLOPHINE) 5 MG tablet Take 5 mg by mouth every 4 hours as needed for Pain Yes Historical Provider, MD   ALPRAZolam Monica Pillion) 0.5 MG tablet Take 1 mg by mouth 3 times daily as needed. . Yes Historical Provider, MD        Social History     Tobacco Use    Smoking status: Current Every Day Smoker     Packs/day: 0.75     Years: 15.00     Pack years: 11.25     Types: Cigarettes    Smokeless tobacco: Never Used   Substance Use Topics    Alcohol use: No        Vitals:    20 1253   BP: 105/62   Pulse: 65   Temp: 98.1 °F (36.7 °C)   TempSrc: Oral   SpO2: 98%   Weight: 215 lb (97.5 kg)     Estimated body mass index is 29.99 kg/m² as calculated from the following:    Height as of 20: 5' 11\" (1.803 m). Weight as of this encounter: 215 lb (97.5 kg). Physical Exam  Constitutional: bilateral knee braces, abrasions on face. No distress. HENT:   Head: Normocephalic and atraumatic   Eyes: EOM are normal. Right eye exhibits no discharge. Left eye exhibits no discharge   Pulmonary: Effort normal, CTAB, no W/R/R  Skin: Patient is not diaphoretic     ASSESSMENT/PLAN:  Saima Brandon was seen today for other.     Diagnoses and all orders for this visit:    Tobacco abuse  Tobacco Abuse: 1 cig pd sporadically. Pt counseled on risks of tobacco use for 3 min and urged to quit. Patient in action stage. Will discuss with patient on future visit    Avascular necrosis ([address]  Bilateral, L worse than R, pt strongly encouraged to reschedule with ortho to discuss having L hip replacement before back     Anxiety  Partially controlled, pt on xanax, declines starting effexor stating he's tried before    Return in about 3 months (around 5/28/2020). An electronic signature was used to authenticate this note.     --Garrett Ugalde MD on 2/28/2020 at 2:50 PM

## 2020-03-05 ENCOUNTER — TELEPHONE (OUTPATIENT)
Dept: FAMILY MEDICINE CLINIC | Age: 60
End: 2020-03-05

## 2020-03-06 ENCOUNTER — TELEPHONE (OUTPATIENT)
Dept: FAMILY MEDICINE CLINIC | Age: 60
End: 2020-03-06

## 2020-03-13 ENCOUNTER — OFFICE VISIT (OUTPATIENT)
Dept: FAMILY MEDICINE CLINIC | Age: 60
End: 2020-03-13
Payer: MEDICARE

## 2020-03-13 VITALS
SYSTOLIC BLOOD PRESSURE: 110 MMHG | WEIGHT: 210 LBS | OXYGEN SATURATION: 97 % | HEIGHT: 71 IN | HEART RATE: 71 BPM | DIASTOLIC BLOOD PRESSURE: 70 MMHG | BODY MASS INDEX: 29.4 KG/M2

## 2020-03-13 PROCEDURE — G8419 CALC BMI OUT NRM PARAM NOF/U: HCPCS | Performed by: FAMILY MEDICINE

## 2020-03-13 PROCEDURE — G8484 FLU IMMUNIZE NO ADMIN: HCPCS | Performed by: FAMILY MEDICINE

## 2020-03-13 PROCEDURE — 4004F PT TOBACCO SCREEN RCVD TLK: CPT | Performed by: FAMILY MEDICINE

## 2020-03-13 PROCEDURE — 99214 OFFICE O/P EST MOD 30 MIN: CPT | Performed by: FAMILY MEDICINE

## 2020-03-13 PROCEDURE — G8427 DOCREV CUR MEDS BY ELIG CLIN: HCPCS | Performed by: FAMILY MEDICINE

## 2020-03-13 PROCEDURE — 3017F COLORECTAL CA SCREEN DOC REV: CPT | Performed by: FAMILY MEDICINE

## 2020-03-13 RX ORDER — NICOTINE 21 MG/24HR
1 PATCH, TRANSDERMAL 24 HOURS TRANSDERMAL EVERY 24 HOURS
Qty: 30 PATCH | Refills: 3 | Status: SHIPPED | OUTPATIENT
Start: 2020-03-13 | End: 2020-04-27 | Stop reason: SDUPTHER

## 2020-03-13 ASSESSMENT — ENCOUNTER SYMPTOMS: BACK PAIN: 1

## 2020-03-13 NOTE — PATIENT INSTRUCTIONS
[facility] orth[facility]  Address  [address]   [place] [address]  Phone  [phone]

## 2020-03-13 NOTE — PROGRESS NOTES
3/13/2020     Eugenio Kan (:  1960)is a 61 y.o. male, here for evaluation of the following medical concerns:    CC: tobacco abuse    HPI     Tobacco abuse  Less than 1 cig pd    Back pain  Chronic, unchanged, saw back surgery and told not good candidate for repeat back surgery    AVN  Has seen [facility], would like referral to different ortho group     Review of Systems   Musculoskeletal: Positive for back pain. Consitutional: No fevers  Neuro: No numbness in groin  /GI: No urinary incontinence    Prior to Visit Medications    Medication Sig Taking? Authorizing Provider   nicotine (NICODERM CQ) 21 MG/24HR Place 1 patch onto the skin every 24 hours Yes Bonita Pappas MD   methadone (DOLOPHINE) 5 MG tablet Take 5 mg by mouth every 4 hours as needed for Pain Yes Historical Provider, MD   ALPRAZolam Glennda Cue) 0.5 MG tablet Take 1 mg by mouth 3 times daily as needed. . Yes Historical Provider, MD        Social History     Tobacco Use    Smoking status: Current Some Day Smoker     Packs/day: 0.75     Years: 15.00     Pack years: 11.25     Types: Cigarettes    Smokeless tobacco: Never Used   Substance Use Topics    Alcohol use: No        Vitals:    20 1102   BP: 110/70   Site: Right Upper Arm   Position: Sitting   Cuff Size: Large Adult   Pulse: 71   SpO2: 97%   Weight: 210 lb (95.3 kg)   Height: 5' 11\" (1.803 m)     Estimated body mass index is 29.29 kg/m² as calculated from the following:    Height as of this encounter: 5' 11\" (1.803 m). Weight as of this encounter: 210 lb (95.3 kg). Physical Exam  Constitutional: appears older than stated age, wearing knee braces and back brace. No distress. HENT:   Head: Normocephalic and atraumatic   Eyes: EOM are normal. Right eye exhibits no discharge.  Left eye exhibits no discharge   Pulmonary: Effort normal, CTAB, no W/R/R  Cardio: RRR, no M/R/G  Skin: Patient is not diaphoretic     ASSESSMENT/PLAN:  Óscar Saldaña was seen today for leg pain.    Diagnoses and all orders for this visit:    Tobacco abuse  -     nicotine (NICODERM CQ) 21 MG/24HR; Place 1 patch onto the skin every 24 hours    Low back pain, unspecified back pain laterality, unspecified chronicity, unspecified whether sciatica present  Chronic, no red flags, the focus going forward is to treat hips and see if back pain improves    AVN (avascular necrosis of bone) ([facility]  Pt given information for [facility]  -     Ambulatory referral to Orthopedic Surgery    Return in about 3 months (around 6/13/2020). An electronic signature was used to authenticate this note.     --Stepan Herrera MD on 3/13/2020 at 1:03 PM

## 2020-04-17 ENCOUNTER — TELEPHONE (OUTPATIENT)
Dept: FAMILY MEDICINE CLINIC | Age: 60
End: 2020-04-17

## 2020-04-27 ENCOUNTER — TELEPHONE (OUTPATIENT)
Dept: FAMILY MEDICINE CLINIC | Age: 60
End: 2020-04-27

## 2020-04-27 DIAGNOSIS — Z72.0 TOBACCO ABUSE: ICD-10-CM

## 2020-04-27 RX ORDER — NICOTINE 21 MG/24HR
1 PATCH, TRANSDERMAL 24 HOURS TRANSDERMAL EVERY 24 HOURS
Qty: 30 PATCH | Refills: 3 | Status: SHIPPED | OUTPATIENT
Start: 2020-04-27 | End: 2020-06-30 | Stop reason: SDUPTHER

## 2020-06-25 ENCOUNTER — OFFICE VISIT (OUTPATIENT)
Dept: ORTHOPEDIC SURGERY | Age: 60
End: 2020-06-25
Payer: COMMERCIAL

## 2020-06-25 VITALS — BODY MASS INDEX: 29.41 KG/M2 | HEIGHT: 71 IN | WEIGHT: 210.1 LBS

## 2020-06-25 PROCEDURE — 99213 OFFICE O/P EST LOW 20 MIN: CPT | Performed by: ORTHOPAEDIC SURGERY

## 2020-06-25 RX ORDER — BUPIVACAINE HYDROCHLORIDE 5 MG/ML
30 INJECTION, SOLUTION PERINEURAL ONCE
Status: COMPLETED | OUTPATIENT
Start: 2020-06-25 | End: 2020-06-25

## 2020-06-25 RX ORDER — BETAMETHASONE SODIUM PHOSPHATE AND BETAMETHASONE ACETATE 3; 3 MG/ML; MG/ML
12 INJECTION, SUSPENSION INTRA-ARTICULAR; INTRALESIONAL; INTRAMUSCULAR; SOFT TISSUE ONCE
Status: COMPLETED | OUTPATIENT
Start: 2020-06-25 | End: 2020-06-25

## 2020-06-25 RX ADMIN — BUPIVACAINE HYDROCHLORIDE 150 MG: 5 INJECTION, SOLUTION PERINEURAL at 11:56

## 2020-06-25 RX ADMIN — BETAMETHASONE SODIUM PHOSPHATE AND BETAMETHASONE ACETATE 12 MG: 3; 3 INJECTION, SUSPENSION INTRA-ARTICULAR; INTRALESIONAL; INTRAMUSCULAR; SOFT TISSUE at 11:56

## 2020-06-25 NOTE — PROGRESS NOTES
Chief Complaint    Knee Pain (LEFT KNEE- DISCUSS SX)      History of Present Illness:  Asad Borrego is a 61 y.o. male presents to the office today for a new problem. Patient sent in orthopedic consultation for Dr. Nanci Taveras. Patient is here with a chief complaint of left and right knee pain. Symptoms have been present for many years. He has received a cortisone injection back in February by Dr. Kirill Li with good but temporary relief. Patient had no recent injury or trauma. Increased pain with activities and improvement with rest.    Patient is also in the process of having his lumbar spine operated on by Dr. Keila Hummel for the second time. In addition patient has seen Dr. Magui Dunlap in the past.  He has been diagnosed with left greater than right femoral head AVN. He currently does not complain of any groin pain. He has more lumbar pain, buttocks pain and radicular symptoms of bilateral lower extremities.     Pain Assessment  Location of Pain: Knee  Location Modifiers: Left  Severity of Pain: 9  Quality of Pain: Aching  Duration of Pain: Persistent  Frequency of Pain: Constant]       Medical History:  Past Medical History:   Diagnosis Date    Arthritis     Asthma     Depression     Fractured pelvis (HCC)     Fractured rib     Hypertension     Irregular heartbeat     Pneumonia     Pneumothorax, left     Sleep apnea     did not tolerate CPAP     Patient Active Problem List    Diagnosis Date Noted    Drug overdose 08/28/2016     Priority: High    Methadone use ([address] 10/13/2014     Priority: Low    Avascular necrosis ([address] 08/30/2019    Tobacco abuse 08/30/2019    Anxiety 08/30/2019    Polyp of transverse colon     Recurrent major depressive disorder, in remission ([address] 08/06/2019    Lower extremity edema 08/06/2019    Other chest pain 05/02/2019    Bilateral low back pain with sciatica 01/19/2016    Chronic pain disorder 01/19/2016    Hx of decompressive lumbar laminectomy 01/19/2016 joint.    Findings: Successful needle placement for knee injection. Final images were taken and saved for permanent record. The patient tolerated the injection well. The patient was instructed to call the office immediately if there is any pain, redness, warmth, fever, or chills. Treatment Plan:  I discussed with the patient the nature of osteoarthritis of the knee. We talked about treatment of arthritis and the various options that are involved with this. The patient understands that the treatments can vary from essentially doing nothing to a total joint replacement arthroplasty for arthritis. I then went on to describe the utilization of glucosamine and chondroitin sulfate as a joint nutrition product. We talked about the fact that this is essentially a joint vitamin with typically minimal side effects. We also talked about utilization of prescription over-the-counter anti-inflammatory medications as the next option. We also discussed the possibility of brace wear or orthotic wear if the patient has significant varus alignment. We then went on to discuss the possibility of Visco supplementation with hyaluronate products. We talked about the typical course of this type of treatment and the fact that often times in the treatment for significant arthritis, this is successful less than half the time. We also talked about the corticosteroid injections and the fact that this can give a brief window of relief, but does not cure the problem; in fact, the pain often has a rebound effect in 6-10 weeks after the steroid has worn off. We also discussed arthroscopy surgery in attempts to debride the joint, but the fact that this is relatively unreliable treatment in the face of significant arthritis. It can occasionally be used, particularly if there is significant meniscus pathology. Lastly we discussed total joint replacement arthroplasty as the final and definitive step in treatment of arthritis.  Patient realizes the magnitude of this type of treatment as well as having voiced a general understanding to the duration of the prosthesis. The patient voiced understanding to these continuum of treatment options. Patient will be given bilateral knee cortisone injections today. He will proceed with his planned surgery with Dr. Dm Patrick. We will also need to monitor his bilateral hip AVN. Should be seen every 6-12 months or sooner if he starts developing any groin pain.

## 2020-06-29 ENCOUNTER — TELEPHONE (OUTPATIENT)
Dept: ADMINISTRATIVE | Age: 60
End: 2020-06-29

## 2020-06-29 NOTE — TELEPHONE ENCOUNTER
Patient called today with pain in knees  Was offered an appointment for virtual visit, refused appointment. Patient would like Rahat Tilley MD to Would like a tele health visit. pharmacy confirmed in [facility].     Patient was made aware of e-visits via [employer]

## 2020-06-30 ENCOUNTER — VIRTUAL VISIT (OUTPATIENT)
Dept: FAMILY MEDICINE CLINIC | Age: 60
End: 2020-06-30
Payer: COMMERCIAL

## 2020-06-30 PROCEDURE — 99442 PR PHYS/QHP TELEPHONE EVALUATION 11-20 MIN: CPT | Performed by: FAMILY MEDICINE

## 2020-06-30 RX ORDER — ALPRAZOLAM 0.5 MG/1
1 TABLET ORAL 3 TIMES DAILY PRN
Status: CANCELLED | OUTPATIENT
Start: 2020-06-30

## 2020-06-30 RX ORDER — METHADONE HYDROCHLORIDE 5 MG/1
5 TABLET ORAL EVERY 4 HOURS PRN
Status: CANCELLED | OUTPATIENT
Start: 2020-06-30

## 2020-06-30 RX ORDER — NICOTINE 21 MG/24HR
1 PATCH, TRANSDERMAL 24 HOURS TRANSDERMAL EVERY 24 HOURS
Qty: 30 PATCH | Refills: 3 | Status: SHIPPED | OUTPATIENT
Start: 2020-06-30 | End: 2020-09-11

## 2020-06-30 NOTE — PROGRESS NOTES
Jorge A Cage is a 61 y.o. male evaluated via telephone on 6/30/2020. Consent:  He and/or health care decision maker is aware that that he may receive a bill for this telephone service, depending on his insurance coverage, and has provided verbal consent to proceed: Yes    Documentation:  I communicated with the patient and/or health care decision maker about low back pain, hx of tobacco abuse   Details of this discussion including any medical advice provided:    Hx of tobacco abuse  No longer smoking, using patches, interested in inhaler    Low back pain  Chronic, plans to have low back surgery, waiting on workman's comp    I affirm this is a Patient Initiated Episode with a Patient who has not had a related appointment within my department in the past 7 days or scheduled within the next 24 hours.     Patient identification was verified at the start of the visit: Yes    Total Time: minutes: 11-20 minutes    Note: not billable if this call serves to triage the patient into an appointment for the relevant concern    Terrence Hill   6/30/2020

## 2020-07-09 ENCOUNTER — TELEPHONE (OUTPATIENT)
Dept: ORTHOPEDIC SURGERY | Age: 60
End: 2020-07-09

## 2020-07-13 ENCOUNTER — OFFICE VISIT (OUTPATIENT)
Dept: ORTHOPEDIC SURGERY | Age: 60
End: 2020-07-13
Payer: COMMERCIAL

## 2020-07-13 VITALS — WEIGHT: 210 LBS | BODY MASS INDEX: 29.4 KG/M2 | HEIGHT: 71 IN

## 2020-07-13 PROCEDURE — 99213 OFFICE O/P EST LOW 20 MIN: CPT | Performed by: PHYSICIAN ASSISTANT

## 2020-07-13 RX ORDER — TRAZODONE HYDROCHLORIDE 100 MG/1
TABLET ORAL
COMMUNITY
Start: 2020-07-06 | End: 2021-02-17

## 2020-07-13 RX ORDER — ALPRAZOLAM 2 MG/1
2 TABLET ORAL 3 TIMES DAILY PRN
COMMUNITY
Start: 2020-07-01

## 2020-07-13 NOTE — PROGRESS NOTES
Chief Complaint    Knee Pain (Wants to discuss TKR)      History of Present Illness:  Valencia Waddell is a 61 y.o. male who is referred by his primary care Dr. Suyapa Santyoo for evaluation bilateral knee pain. Patient has a very complex musculoskeletal history which he fell from a height in 1992 creating a injury to his lower back hips and knees. He states that he has had prior laminectomies and a prior spinal fusion in his lumbar spine which has gone on to have a spondylolisthesis at L4-5. His spinal fusion is L5-S1. He is in the process of getting his spinal surgery approved by [employer]. His surgeon is Dr. Tawanna Cole. He also has a history of bilateral AVN of his hips that is not being treated at the present time. His knee pain bilaterally is left greater than right and he states that he has had knee pain for years. He states that the pain is severe with any weightbearing activity and uses to hinged braces and ambulates with crutches. He states that he has limited range of motion either knee secondary to pain. He states that he has had several cortisone injections and several series of gel injections into both knees. He states the pain is mainly over the medial aspect of both knees but also finds global knee pain. Patient is presently taking methadone 5 mg every 4 hours, trazodone and Xanax on a regular basis. Medical History:  Patient's medications, allergies, past medical, surgical, social and family histories were reviewed and updated as appropriate. Review of Systems:  Relevant review of systems reviewed and available in the patient's chart    Vital Signs: There were no vitals filed for this visit. General Exam:   Constitutional: Patient is adequately groomed with no evidence of malnutrition  DTRs: Deep tendon reflexes are intact  Mental Status: The patient is oriented to time, place and person. The patient's mood is very depressive and at times tearful .   lymphatic: The lymphatic examination bilaterally reveals all areas to be without enlargement or induration. Vascular: Examination reveals no swelling or calf tenderness. Peripheral pulses are palpable and 2+. Neurological: The patient has good coordination. There is no weakness or sensory deficit. Body mass index is 29.31 kg/m². Bilateral knee Examination:    Inspection:  No erythema or signs of infection. There are no cutaneous lesions    Palpation:  There is tenderness to palpation along the medial joint line bilaterally. .    Range of Motion: -5 extension to 80 degrees of active flexion on the right with -10 degrees of extension to 80 degrees of flexion on the left. Strength:  4/5 quadriceps and hamstrings    Special Tests: The knee is stable to varus valgus stressing/anterior posterior drawer. Negative Homans test.                                 Skin: There are no rashes, ulcerations or lesions. Gait: Ambulation is with bilateral hinged braces with crutches        Examination of the bilateral hips reveals intact skin. The patient demonstrates limited range of motion with regards to flexion, abduction, internal and external rotation with moderate to severe pain upon range of motion testing. There is moderate tenderness about the greater trochanter. There is a negative straight leg raise against resistance is weak and painful. Strength is 4 /5 throughout all planes. Radiology:   X-rays obtained and reviewed in office:  Views 4 views including AP weightbearing, PA flexed weightbearing, lateral, and skyline  Location bilateral knee:    Impression:   There is end-stage medial compartment osteoarthritis with sclerosis and osteophyte formation present bilaterally. The patellofemoral joint is moderate with peripheral osteophyte formation bilaterally  No fractures are identified. Impression:  Encounter Diagnoses   Name Primary?     Pain in both knees, unspecified chronicity Yes    Primary osteoarthritis of both knees     Lumbar pain        Office Procedures:  Orders Placed This Encounter   Procedures    XR KNEE RIGHT (MIN 4 VIEWS)     Standing Status:   Future     Number of Occurrences:   1     Standing Expiration Date:   7/13/2021    XR KNEE LEFT (MIN 4 VIEWS)     Standing Status:   Future     Number of Occurrences:   1     Standing Expiration Date:   7/13/2021   [facility]     Referral Priority:   Routine     Referral Type:   Eval and Treat     Referral Reason:   Specialty Services Required     Requested Specialty:   Physical Therapy     Number of Visits Requested:   1       Treatment Plan: I had a long discussion today with the patient regarding his ongoing back pain and pending upcoming lumbar fusion, AVN of both hips, and end-stage arthritis in the knees. I have advised him he needs physical therapy to increase his range of motion and strength in both his knees and to help with his core strength.   I have given him a referral to outpatient physical therapy and he will follow-up in 3 to 4 weeks for repeat clinical examination

## 2020-07-15 ENCOUNTER — TELEPHONE (OUTPATIENT)
Dept: ORTHOPEDIC SURGERY | Age: 60
End: 2020-07-15

## 2020-07-15 NOTE — TELEPHONE ENCOUNTER
Please see note from [address] visit on 7/13. Patient came in to discuss TKR. He said he really wants to have his knees replacements. Said he had already talked to you about this previously. He didn't get to see you on Monday because you were running late from surgery. He said he was given a PT script and told he needed to strengthen his core. He wanted to speak with you. Wants to get his knees fixed so he can get stronger and have his back surgery.   (Per patient Dr. Jade Light said he nees to have his knees done first)

## 2020-07-16 NOTE — TELEPHONE ENCOUNTER
Please call patient. My concerns with surgery is the amount of methadone he takes. Also his pain management physician will have to agree to handle his postoperative pain.

## 2020-07-17 NOTE — TELEPHONE ENCOUNTER
Spoke to patient. He states that he will discuss his postoperative pain management with his pain management physician. He cannot recall the name of his pain management doctor and I cannot find it in his chart. We have also asked him to reduce or completely eliminate his methadone before surgery. Lastly he has quit smoking about 2 months ago but continue to use a nicotine patch and a nicotine inhaler. He is aware that he will need to be completely nicotine free for at least 6 weeks before surgery. Based on our conversation with the patient today he will need at least a nicotine metabolite test before surgery.

## 2020-08-03 ENCOUNTER — TELEPHONE (OUTPATIENT)
Dept: ORTHOPEDIC SURGERY | Age: 60
End: 2020-08-03

## 2020-08-03 ENCOUNTER — TELEPHONE (OUTPATIENT)
Dept: FAMILY MEDICINE CLINIC | Age: 60
End: 2020-08-03

## 2020-08-03 NOTE — TELEPHONE ENCOUNTER
Patient has developed groin pain. Said leg was numb all they way down his leg. Wanted to go to ER over the weekend but scare because of [place]. Marina May this has gotten much worse and has never felt like this.

## 2020-08-03 NOTE — TELEPHONE ENCOUNTER
Patient calling back. Said he cant lift his leg. He has to drag it. He keeps asking for his knee replacement. Davy Dar he is living in agony. Knee is swollen. Leg feels like it is dead. Can someone please call him?

## 2020-08-03 NOTE — TELEPHONE ENCOUNTER
If the patient is having this much of a problem he will likely just need to go to the emergency room. It may be completely unrelated. He is also currently in pain management.

## 2020-08-10 ENCOUNTER — OFFICE VISIT (OUTPATIENT)
Dept: ORTHOPEDIC SURGERY | Age: 60
End: 2020-08-10
Payer: COMMERCIAL

## 2020-08-10 VITALS — BODY MASS INDEX: 32.51 KG/M2 | WEIGHT: 240 LBS | HEIGHT: 72 IN

## 2020-08-10 PROCEDURE — 99213 OFFICE O/P EST LOW 20 MIN: CPT | Performed by: ORTHOPAEDIC SURGERY

## 2020-08-10 PROCEDURE — L1810 KO ELASTIC WITH JOINTS: HCPCS | Performed by: ORTHOPAEDIC SURGERY

## 2020-08-10 NOTE — PROGRESS NOTES
Chief Complaint    Knee Pain (bilat knee/hip pain wants to know when someone is going to help him and have sx, hx of back issues)      History of Present Illness:  Cedric Young is a 61 y.o. male who presents today for follow-up on his bilateral knee pain and left hip pain. Patient states that approximately 4 days ago while trying to get up out of a chair he had increased pain within the left groin. He states the pain is sharp and any weightbearing attempt increases his pain within the left groin. He has been ambulating with crutches with only toe-touch weightbearing on the left. He continues to complain of severe pain in both knees. Pain Assessment  Location of Pain: Leg  Location Modifiers: Right, Left  Severity of Pain: 9  Frequency of Pain: Constant  Aggravating Factors: Walking, Standing  Limiting Behavior: Yes  Result of Injury: No  Work-Related Injury: No  Are there other pain locations you wish to document?: No]    Medical History:  Patient's medications, allergies, past medical, surgical, social and family histories were reviewed and updated as appropriate. Review of Systems:  Relevant review of systems reviewed and available in the patient's chart    Vital Signs: There were no vitals filed for this visit. General Exam:   Constitutional: Patient is adequately groomed with no evidence of malnutrition  DTRs: Deep tendon reflexes are intact  Mental Status: The patient is oriented to time, place and person. The patient's mood is very depressive and at times tearful . lymphatic: The lymphatic examination bilaterally reveals all areas to be without enlargement or induration. Vascular: Examination reveals no swelling or calf tenderness. Peripheral pulses are palpable and 2+. Neurological: The patient has good coordination. There is no weakness or sensory deficit. Body mass index is 32.55 kg/m². Left hip examination:    Inspection:  No erythema or signs of infection.   There are no cutaneous lesions    Palpation: Patient is exquisitely tender to palpation over the left groin lateral hip and into his left knee. Range of Motion: Patient has extreme pain with any attempt of range of motion of his left leg. Strength: Unable to test secondary to pain  Special Tests: Positive logroll maneuver in the left leg    Skin: There are no rashes, ulcerations or lesions. Gait: Ambulation is with bilateral hinged braces with crutches    Examination of both knees reveal varus deformities with limited range of motion at the present time due to patient's complaints of pain      Radiology:   X-rays obtained and reviewed in office:  Views 2 views to include AP of the pelvis and frog lateral left hip  Location left hip:    Impression:   There is AVN in the left hip which on the lateral view shows probable collapse of the femoral head. There is a possible discrete fracture noted. Impression:  Encounter Diagnoses   Name Primary?  Primary osteoarthritis of both knees     Avascular necrosis (HCC)     Acute hip pain, left Yes       Office Procedures:  Orders Placed This Encounter   Procedures    XR HIP LEFT (2-3 VIEWS)     Standing Status:   Future     Number of Occurrences:   1     Standing Expiration Date:   8/10/2021    MRI HIP LEFT WO CONTRAST     Standing Status:   Future     Standing Expiration Date:   8/10/2021     Scheduling Instructions:      [address] [place] [address]      [phone]            PATIENT TO CALL AND SCHEDULE WHEN SCAN APPROVED     Order Specific Question:   Reason for exam:     Answer:   EVALUATE FOR FX, PROGRESSION OF AVN    Breg / [employer] Hinged Knee Brace     Patient was prescribed an Economy Hinged Knee Brace. The bilateral knee will require stabilization / immobilization from this semi-rigid / rigid orthosis to improve their function. The orthosis will assist in protecting the affected area, provide functional support and facilitate healing.     The patient was educated

## 2020-08-12 ENCOUNTER — HOSPITAL ENCOUNTER (OUTPATIENT)
Dept: MRI IMAGING | Age: 60
Discharge: HOME | End: 2020-08-12
Payer: COMMERCIAL

## 2020-08-12 PROCEDURE — 73721 MRI JNT OF LWR EXTRE W/O DYE: CPT

## 2020-08-14 ENCOUNTER — OFFICE VISIT (OUTPATIENT)
Dept: FAMILY MEDICINE CLINIC | Age: 60
End: 2020-08-14
Payer: COMMERCIAL

## 2020-08-14 VITALS
HEART RATE: 71 BPM | TEMPERATURE: 97.8 F | BODY MASS INDEX: 36.35 KG/M2 | SYSTOLIC BLOOD PRESSURE: 140 MMHG | WEIGHT: 268 LBS | OXYGEN SATURATION: 96 % | DIASTOLIC BLOOD PRESSURE: 78 MMHG

## 2020-08-14 PROCEDURE — 99214 OFFICE O/P EST MOD 30 MIN: CPT | Performed by: FAMILY MEDICINE

## 2020-08-14 ASSESSMENT — ENCOUNTER SYMPTOMS: COUGH: 0

## 2020-08-14 NOTE — PROGRESS NOTES
2020     Charlee Myers (:  1960)is a 61 y.o. male, here for evaluation of the following medical concerns:    CC: hx tobacco abuse    HPI     Hx of Tobacco abuse  Gave up recently    Avascular necrosis  Picked up on abdominal CT  Avascular necrosis involving the femoral heads bilaterally     Recently had repeat imaging    OA  Knees bilaterally    methodone use  On 5mg of methadone Q4H PRN for chronic pain     Review of Systems   Constitutional: Negative for fever. Respiratory: Negative for cough. Prior to Visit Medications    Medication Sig Taking? Authorizing Provider   ALPRAZolam (XANAX) 2 MG tablet TK 1 T PO TID PRN AND 1 T HS PRN FOR AGITATION Yes Historical Provider, MD   methadone (DOLOPHINE) 5 MG tablet Take 5 mg by mouth every 4 hours as needed for Pain Yes Historical Provider, MD   traZODone (DESYREL) 100 MG tablet   Historical Provider, MD   nicotine (NICOTROL) 10 MG inhaler Inhale 1 puff into the lungs as needed for Smoking cessation  Patient not taking: Reported on 2020  Ashley Vital MD   nicotine (NICODERM CQ) 21 MG/24HR Place 1 patch onto the skin every 24 hours  Patient not taking: Reported on 2020  Ashley Vital MD        Social History     Tobacco Use    Smoking status: Light Tobacco Smoker     Packs/day: 0.75     Years: 15.00     Pack years: 11.25     Types: Cigarettes    Smokeless tobacco: Never Used   Substance Use Topics    Alcohol use: No        Vitals:    20 1505   BP: (!) 140/78   Pulse: 71   Temp: 97.8 °F (36.6 °C)   TempSrc: Temporal   SpO2: 96%   Weight: 268 lb (121.6 kg)     Estimated body mass index is 36.35 kg/m² as calculated from the following:    Height as of 8/10/20: 6' (1.829 m). Weight as of this encounter: 268 lb (121.6 kg). Physical Exam  Constitutional: appears well-developed and well-nourished. No distress. HENT:   Head: Normocephalic and atraumatic   Eyes: EOM are normal. Right eye exhibits no discharge.  Left eye exhibits no discharge   Skin: Patient is not diaphoretic     ASSESSMENT/PLAN:  Rex Jensen was seen today for annual exam.    Diagnoses and all orders for this visit:    History of tobacco abuse  Encouraged abstinence    Avascular necrosis ([facility] [address]  Pt to schedule surgery but waiting on workers comp    Osteoarthritis, unspecified osteoarthritis type, unspecified site  Pt to schedule surgery but waiting on workers comp    Methadone use  Cont methadone    Return in about 3 months (around 11/14/2020). An electronic signature was used to authenticate this note.     --Yves Mauro MD on 8/14/2020 at 3:53 PM

## 2020-08-17 ENCOUNTER — TELEPHONE (OUTPATIENT)
Dept: ORTHOPEDIC SURGERY | Age: 60
End: 2020-08-17

## 2020-08-17 NOTE — TELEPHONE ENCOUNTER
Spoke to patient. Informed him of his MRI results. He already has an appointment to come in and review it in more detail.

## 2020-08-24 ENCOUNTER — OFFICE VISIT (OUTPATIENT)
Dept: ORTHOPEDIC SURGERY | Age: 60
End: 2020-08-24
Payer: COMMERCIAL

## 2020-08-24 PROCEDURE — 99214 OFFICE O/P EST MOD 30 MIN: CPT | Performed by: ORTHOPAEDIC SURGERY

## 2020-08-24 PROCEDURE — L1830 KO IMMOB CANVAS LONG PRE OTS: HCPCS | Performed by: ORTHOPAEDIC SURGERY

## 2020-08-24 NOTE — LETTER
Attention    These are medical screening labs, not pre-admission surgery labs. Via Ferdinand Fulton M.D.  [phone]  [facility], [address] [facility]    Date:  2020    Name: Valencia Waddell    : 1960    Please take this form with you.       THE FOLLOWING LABS NEED TO BE COMPLETED:    _x__UA  _x__URINE C/S (THIS NEEDS TO BE DONE EVEN IF THE UA IS NORMAL)  _x__CBC W/ DIFF  _x__PT/INR  _x__PTT  _x__TRANSFERRIN  _x__ALBUMIN  _x__CHEM 7  _x__HEMAGLOBIN A1-C  ____OTHER: _____________________________________________                         Diagnosis: LT KNEE OSTEOARTHRITIS            RT KNEE OA M17.11      LT KNEE OA M17.12         RT HIP OA  M16.11         LT HIP OA M16.12         RT SHLD OA  M19.011   LT SHLD OA  M19.012             Z01.812  (Pre-op examination code)      2020 4:12 PM  Linette Jordan PA-C

## 2020-08-24 NOTE — LETTER
CONSENT TO SURGICAL OR MEDICAL PROCEDURE    PATIENTS NAMES: Madeleine Sosa 1960  61 y.o.      [phone] (home)   DATE/TIME: 8/24/2020 4:12 PM    1) I consent that Dr. Julisa Morales perform one or more surgical and or medical procedures on the above named patient at: [facility]/[place] [address] to treat the condition(s) which appear indicated by the diagnostic studies already preformed. I have been told in general terms the nature and purpose of the procedure(s) and what the procedure(s) is/are expected to accomplish. They procedure(s) are as follows:   LT TOTAL KNEE MAKOPLASTY    2) It has been explained to me by the informing physician that during the course of any surgical or medical procedure unforeseen condition(s) may be revealed that necessitate an extension of the original procedure(s) or a different procedure(s) than set forth in Paragraph 1. I therefore consent that the above named physician perform such additional or different procedure(s) as are necessary or desirable in the exercise of his professional judgement. 3) I have been made aware by the informing physician of certain reasonably known risks that are associated with the procedure(s) set forth in Paragraph 1.  I understand the reasonably known risk to be: Including but not limited to: CVA, infection, M.I., Phlebitis, Cardiac Arrest and Pulmonary Embolism, Loss of Circulation, Nerve Injury, Delayed Healing, Recurrence, Loss of extremity/digit, R.S.D., Screw breakage, Arthritis, Pain, Swelling, Stiffness, Failure of Prothesis, Fracture, Leg length discrepancy, Wound complication/non-healing, need for further surgery and persistent pain. 4) I have also been informed by the informing physician that there are other risks, from both known and unknown causes, that are attendant to the performance of any surgical or medical procedure(s).   I am aware that the The undersigned represents that he or she is duly authorized to execute to this consent for and on the behalf of the above named patient.    ________________________________             __________________________________________  Witness                                                                         Parent/Spouse/Guardian/Other:_________________    Medical Record#  Insurance  Smartphone:  Yes   Or   No  Email:                   You have signed a consent to have a total joint replacement surgery performed. Before you can proceed with surgery the following things must be done. Please use this form as a checklist.      _____   Please schedule your Physical Therapy functional evaluation. (Allowed locations are listed on the order)    _____   Please take your lab orders and get your blood work done at a [facility]. (If needed, please use the web site to find the location closest to you:  [email or web address]) You do not need an appointment and you do not need to fast.    _____  If you did not register in the office, you will need to go to the website for [facility] ([email or web address]) to complete registration and the medical questionnaire prior to your physical therapy evaluation. Do not schedule an appointment with your primary care physician until you have a surgery date. This pre-op history and physical has to be within 30 days of the surgery. _____  CT Scan. This will be scheduled once your surgery has been scheduled. _____  Information about the pre-op class, your CT scan, your post-op appointment and cold therapy options will be in your surgery packet that will be mailed to you after you are scheduled for surgery. Once you have completed both the labs and the Physical Therapy evaluation please call [facility] @ [phone] to let her know.   Once verification of the PT Evaluation and completed labs has been determined you will be called and set up for surgery. This may take 1-2 days to check results and return your phone call. You will not be called about lab results if everything is normal.      Please make sure you have not blocked our number. Sharonia Ishmael will call from [phone] / [phone]. Also, please make sure your voice mail is not full.

## 2020-08-24 NOTE — PROGRESS NOTES
Chief Complaint    Results (mri lt hip)  Preoperative discussion for a left total knee    History of Present Illness:  Shweta Mcknight is a 61 y.o. male who returns today for review of results of his left hip MRI and for reoperative discussion in regards to a left total knee arthroplasty. His primary care physician is Dr. Akbar Baker. He is currently in pain management and is receiving methadone 5 mg 3 times a day along with Xanax 2 2 mg that he takes at night. Patient states that he is having severe pain at all times which he finds is exacerbated by any weightbearing activity. He is presently ambulating with 2 crutches due to his knee pain. His MRI results on his left hip revealed AVN involving 20% of the left femoral head with no articular surface collapse. There is moderate articular cartilage loss noted. Patient does complain of pain that radiates down his leg which he states makes his legs feel heavy and he has to pick his leg up at times. Patient states that he does perform a home exercise program to help with his quad strength and his hamstring strength bilaterally. PAIN:   Pain Assessment  Location of Pain: Leg  Location Modifiers: Left  Severity of Pain: 9  Frequency of Pain: Constant  Aggravating Factors: Walking, Standing  Limiting Behavior: Yes  Result of Injury: No  Work-Related Injury: No  Are there other pain locations you wish to document?: No    The patients chronic pain has gradually worsening over the last 2 years. The patient rates pain on a level of 9/10. Pain impacts patients ability to drive, sleep and climb stairs. Medical History:   Patient's medications, allergies, past medical, surgical, social and family histories were reviewed and updated as appropriate. Medication Management  Patient has been treated with NSAIDs, Steroids and Analgesics with Minimal relief for 2 years.     Review of Systems:  Relevant review of systems reviewed and available in the patient's chart    Vital Signs: There were no vitals filed for this visit. There is no height or weight on file to calculate BMI. Limitation in Activities of Daily Living (ADLs)  The patient is able to ambulate with the assistance of crutches for 2 steps  steps/feet. Walking distance less than 1 block    Patient needs assistance with activities of daily living cooking and work. Extended Care / [facility]: No     Safety Issues: Home safety issues, Difficulty with daily activities and Risk of falls  Patient is at risk for falls/fall history: Yes    General Exam:   Constitutional: Patient is adequately groomed with no evidence of malnutrition  DTRs: Deep tendon reflexes are intact  Mental Status: The patient is oriented to time, place and person. The patient's mood and affect are appropriate. Lymphatic: The lymphatic examination bilaterally reveals all areas to be without enlargement or induration. Vascular: Examination reveals no swelling or calf tenderness. Peripheral pulses are palpable and 2+. Neurological: The patient has good coordination. There is no weakness or sensory deficit. Left knee Examination:    Inspection:  No erythema or signs of infection. Positive knee effusion. There are no cutaneous lesions    Palpation:  There is tenderness to palpation along the medial and lateral joint line. Patella compression also reproduces pain    Range of Motion: -5 extension to 90 degrees of active flexion. Crepitation present throughout range of motion    Strength:  3/5 quadriceps and hamstrings    Special Tests: The knee is stable to varus valgus stressing/anterior posterior drawer. Negative Homans test.                                 Skin: There are no rashes, ulcerations or lesions. Gait: antalgic favoring the left side    Reflex 2+ patellar      Examination of the left hip reveals intact skin.  The patient demonstrates pain with range of motion with regards to flexion, abduction, internal and external rotation. There is  tenderness about the greater trochanter. There is a positive straight leg raise against resistance. Strength is 3/5 throughout all planes. Radiology:   None performed today but the x-rays from 7/13/2020 which includes 4 views of the left knee, AP, lateral, sunrise view, tunnel view shows tibiofemoral subluxation with end-stage osteoarthritis within the medial compartment of the left knee. There is referral osteophyte formation noted. Patellofemoral joint shows moderate osteoarthritis. Failed Outpatient management or Previous Surgical Intervention  Patient has undergone conservative treatment of left knee for the past 2 year. Patient has undergone therapy consisting of at least 3 months of physical therapy which included muscle strengthening and flexibility program, cortisone injections, Visco supplementation, different oral medications including NSAIDs and analgesics, efforts at weight loss, and activity modification years with no improvement in  pain relief or function. Impression:  Encounter Diagnoses   Name Primary?  Avascular necrosis (HCC) Yes    Primary osteoarthritis of both knees        Office Procedures:  Orders Placed This Encounter   Procedures    [facility] [address] [facility]     Referral Priority:   Routine     Referral Type:   Eval and Treat     Referral Reason:   Specialty Services Required     Requested Specialty:   Physical Therapy     Number of Visits Requested:   1    Breg Knee Immobilizer Brace     Patient was prescribed a Breg Knee Immobilizer. The left knee will require stabilization / immobilization from this semi-rigid / rigid orthosis to improve their function. The orthosis will assist in protecting the affected area, provide functional support and facilitate healing.     The prefabricated orthosis was modified in the following manner to provide a customizable fit for the patient at the time of delivery. 1. Identification of appropriate positioning and alignment of anatomical landmarks. 2. Trimming of straps and panels. Reassemble orthosis to specifically fit patient. The patient was educated and fit by a healthcare professional with expert knowledge and specialization in brace application while under the direct supervision of the treating physician. Verbal and written instructions for the use of and application of this item were provided. They were instructed to contact the office immediately should the brace result in increased pain, decreased sensation, increased swelling or worsening of the condition. Treatment Plan:  I discussed with the patient the nature of osteoarthritis of the knee. We talked about treatment of arthritis and the various options that are involved with this. The patient understands that the treatments can vary from essentially doing nothing to a total joint replacement arthroplasty for arthritis. I then went on to describe the utilization of glucosamine and chondroitin sulfate as a joint nutrition product. We talked about the fact that this is essentially a joint vitamin with typically minimal side effects. We also talked about utilization of prescription over-the-counter anti-inflammatory medications as the next option. We also discussed the possibility of brace wear or orthotic wear if the patient has significant varus alignment. We then went on to discuss the possibility of Visco supplementation with hyaluronate products. We talked about the typical course of this type of treatment and the fact that often times in the treatment for significant arthritis, this is successful less than half the time. We also talked about the corticosteroid injections and the fact that this can give a brief window of relief, but does not cure the problem; in fact, the pain often has a rebound effect in 6-10 weeks after the steroid has worn off.  We also discussed arthroscopy surgery understands that although the patient typically functional by 6-8 weeks postop that it may take 9 months to year for full recovery. All questions were answered. Today we had discussion with the patient regarding his diagnosis and treatment plan. We have said his expect patients that he might still have pain within the left knee due to the arthritis in his left hip and due to his back pain. Patient was also informed that he has to decrease his methadone intake due to the difficulty controlling his pain postoperatively. He is to work harder on his home quad and hamstring strengthening program and he he will be involved in rehab for the upcoming left total knee arthroplasty. Demand matching tool is been filled out, patient is advanced.

## 2020-09-02 DIAGNOSIS — M25.562 LEFT KNEE PAIN, UNSPECIFIED CHRONICITY: Primary | ICD-10-CM

## 2020-09-03 ENCOUNTER — TELEPHONE (OUTPATIENT)
Dept: ORTHOPEDIC SURGERY | Age: 60
End: 2020-09-03

## 2020-09-03 NOTE — TELEPHONE ENCOUNTER
Spoke to patient. He has not yet had his lab work performed. I have asked him to get this done sometime within the next few days. I have also added a nicotine metabolite test to his lab work. Based on our conversation I am suspicious that he is still smoking. He will need to have a nicotine and metabolite test which is void of all nicotine before proceeding with surgery. I have warned the patient that this may postpone his case. I have placed the order in his chart.

## 2020-09-08 ENCOUNTER — TELEPHONE (OUTPATIENT)
Dept: ORTHOPEDIC SURGERY | Age: 60
End: 2020-09-08

## 2020-09-10 ENCOUNTER — TELEPHONE (OUTPATIENT)
Dept: FAMILY MEDICINE CLINIC | Age: 60
End: 2020-09-10

## 2020-09-10 NOTE — TELEPHONE ENCOUNTER
Leighton Klein,     This patient is coming in tomorrow at 12:40 and  needs the forms under letters in this patients chart filled out. He is scheduled for surgery and will not get his labs done. He said he is  giving him the run around because he thinks he does not want his Nicotine's done.

## 2020-09-11 ENCOUNTER — OFFICE VISIT (OUTPATIENT)
Dept: FAMILY MEDICINE CLINIC | Age: 60
End: 2020-09-11
Payer: COMMERCIAL

## 2020-09-11 ENCOUNTER — HOSPITAL ENCOUNTER (OUTPATIENT)
Age: 60
Discharge: HOME | End: 2020-09-15
Payer: COMMERCIAL

## 2020-09-11 VITALS
DIASTOLIC BLOOD PRESSURE: 78 MMHG | WEIGHT: 223 LBS | SYSTOLIC BLOOD PRESSURE: 124 MMHG | OXYGEN SATURATION: 95 % | TEMPERATURE: 97.5 F | BODY MASS INDEX: 30.24 KG/M2 | HEART RATE: 71 BPM

## 2020-09-11 LAB
ALBUMIN SERPL-MCNC: 3.8 G/DL (ref 3.4–5)
ANION GAP SERPL CALCULATED.3IONS-SCNC: 11 MMOL/L (ref 3–16)
APTT: 37.2 SEC (ref 24.2–36.2)
BASOPHILS ABSOLUTE: 0 K/UL (ref 0–0.2)
BASOPHILS RELATIVE PERCENT: 0.3 %
BILIRUBIN URINE: NEGATIVE
BLOOD, URINE: ABNORMAL
BUN BLDV-MCNC: 16 MG/DL (ref 7–20)
CALCIUM SERPL-MCNC: 8.9 MG/DL (ref 8.3–10.6)
CHLORIDE BLD-SCNC: 103 MMOL/L (ref 99–110)
CLARITY: CLEAR
CO2: 26 MMOL/L (ref 21–32)
COLOR: YELLOW
CREAT SERPL-MCNC: 1 MG/DL (ref 0.9–1.3)
EOSINOPHILS ABSOLUTE: 0.1 K/UL (ref 0–0.6)
EOSINOPHILS RELATIVE PERCENT: 0.9 %
EPITHELIAL CELLS, UA: ABNORMAL /HPF (ref 0–5)
GFR AFRICAN AMERICAN: >60
GFR NON-AFRICAN AMERICAN: >60
GLUCOSE BLD-MCNC: 121 MG/DL (ref 70–99)
GLUCOSE URINE: NEGATIVE MG/DL
HCT VFR BLD CALC: 42.7 % (ref 40.5–52.5)
HEMOGLOBIN: 14.3 G/DL (ref 13.5–17.5)
INR BLD: 1.06 (ref 0.86–1.14)
KETONES, URINE: NEGATIVE MG/DL
LEUKOCYTE ESTERASE, URINE: NEGATIVE
LYMPHOCYTES ABSOLUTE: 2.1 K/UL (ref 1–5.1)
LYMPHOCYTES RELATIVE PERCENT: 31.5 %
MCH RBC QN AUTO: 28.2 PG (ref 26–34)
MCHC RBC AUTO-ENTMCNC: 33.5 G/DL (ref 31–36)
MCV RBC AUTO: 84.4 FL (ref 80–100)
MICROSCOPIC EXAMINATION: YES
MONOCYTES ABSOLUTE: 0.4 K/UL (ref 0–1.3)
MONOCYTES RELATIVE PERCENT: 5.6 %
MUCUS: ABNORMAL /LPF
NEUTROPHILS ABSOLUTE: 4.1 K/UL (ref 1.7–7.7)
NEUTROPHILS RELATIVE PERCENT: 61.7 %
NITRITE, URINE: NEGATIVE
PDW BLD-RTO: 14.4 % (ref 12.4–15.4)
PH UA: 5.5 (ref 5–8)
PLATELET # BLD: 168 K/UL (ref 135–450)
PMV BLD AUTO: 9.3 FL (ref 5–10.5)
POTASSIUM SERPL-SCNC: 3.6 MMOL/L (ref 3.5–5.1)
PROTEIN UA: NEGATIVE MG/DL
PROTHROMBIN TIME: 12.3 SEC (ref 10–13.2)
RBC # BLD: 5.05 M/UL (ref 4.2–5.9)
RBC UA: ABNORMAL /HPF (ref 0–4)
SODIUM BLD-SCNC: 140 MMOL/L (ref 136–145)
SPECIFIC GRAVITY UA: >=1.03 (ref 1–1.03)
TRANSFERRIN: 218 MG/DL (ref 200–360)
URINE TYPE: ABNORMAL
UROBILINOGEN, URINE: 0.2 E.U./DL
WBC # BLD: 6.6 K/UL (ref 4–11)
WBC UA: ABNORMAL /HPF (ref 0–5)

## 2020-09-11 PROCEDURE — 87086 URINE CULTURE/COLONY COUNT: CPT

## 2020-09-11 PROCEDURE — 85025 COMPLETE CBC W/AUTO DIFF WBC: CPT

## 2020-09-11 PROCEDURE — 82040 ASSAY OF SERUM ALBUMIN: CPT

## 2020-09-11 PROCEDURE — 36415 COLL VENOUS BLD VENIPUNCTURE: CPT

## 2020-09-11 PROCEDURE — 99215 OFFICE O/P EST HI 40 MIN: CPT | Performed by: FAMILY MEDICINE

## 2020-09-11 PROCEDURE — 80048 BASIC METABOLIC PNL TOTAL CA: CPT

## 2020-09-11 PROCEDURE — 85610 PROTHROMBIN TIME: CPT

## 2020-09-11 PROCEDURE — 81001 URINALYSIS AUTO W/SCOPE: CPT

## 2020-09-11 PROCEDURE — 83036 HEMOGLOBIN GLYCOSYLATED A1C: CPT

## 2020-09-11 PROCEDURE — 84466 ASSAY OF TRANSFERRIN: CPT

## 2020-09-11 PROCEDURE — 85730 THROMBOPLASTIN TIME PARTIAL: CPT

## 2020-09-11 ASSESSMENT — ENCOUNTER SYMPTOMS
COUGH: 0
SHORTNESS OF BREATH: 0
BLOOD IN STOOL: 0

## 2020-09-11 NOTE — PROGRESS NOTES
CC: preop exam    Subjectiv   Yuki Sagastume is a 61 y.o. male here for preoperative consultation for L TKA on 2/71/13    Prior complications from surgery or anesthesia? Yes, was allergic to demerol  Uncontrolled blood pressure? no  Recent illnesses: none    Able to complete activities such as climbing steps or mowing the lawn without chest pain or shortness of breath? yes  Recent cardiac procedures such as stent placement or bypass surgery? no  Other problems:    Hx of Tobacco abuse  Taking nicotine     Chronic pain  Methadone  2 tabs per day    Avascular necrosis  Picked up on abdominal CT  Avascular necrosis involving the femoral heads bilaterally     Allergies   Allergen Reactions    Demerol Hcl [Meperidine] Anaphylaxis    Gabapentin Shortness Of Breath    Zoloft [Sertraline] Other (See Comments)     headaches       Past Medical History:   Diagnosis Date    Arthritis     Asthma     Depression     Fractured pelvis (HCC)     Fractured rib     Hypertension     Irregular heartbeat     Pneumonia     Pneumothorax, left     Sleep apnea     did not tolerate CPAP          Past Surgical History:   Procedure Laterality Date    CARDIAC SURGERY      cardiac cath    COLONOSCOPY N/A 8/20/2019    COLONOSCOPY POLYPECTOMY REMOVAL HOT BIOPSY performed by Nick Durand MD at [facility] Left     ulna    LAMINECTOMY      2 partial laminectomy    LUMBAR FUSION      LUMBAR SPINE SURGERY      pain stimulator placed in lower back left side L4-L5 then removed          Current Outpatient Medications on File Prior to Visit   Medication Sig Dispense Refill    mupirocin (BACTROBAN) 2 % ointment 1 22 gram tube. Apply 1 cm (small drop) to a q-tip and swab the inside of each nostril twice a day starting 5 days prior to surgery.  1 Tube 0    ALPRAZolam (XANAX) 2 MG tablet TK 1 T PO TID PRN AND 1 T HS PRN FOR AGITATION      traZODone (DESYREL) 100 MG tablet       nicotine (NICOTROL) 10 cerebrovascular disease (history oftransient ischemic attack or stroke) no   5. Preoperative treatment with insulin - no  6. Preoperative serum creatinine >2.0 mg/dl - no    Each risk Factor is assigned one point. Total Points 0    Points Class Risk of Major cardiac event (includes myocardial infarction, pulmonary edema, ventricularfibrillation, primary cardiac arrest and complete heart block). 0  I 0.4%   1  II 0.9%   2  III 6.6%   3 or more  IV 11%       Assessment   Yeimi Mcgill was seen today for pre-op exam.  Diagnoses and all orders for this visit:    Pre-op exam  Based on ACC/AHA guidelines pt does not meet criteria for non inv testing. Pt cleared for surgery  -     ALBUMIN; Future  -     APTT; Future  -     BASIC METABOLIC PANEL; Future  -     CBC WITH AUTO DIFFERENTIAL; Future  -     Culture, Urine; Future  -     Hemoglobin A1C; Future  -     PROTIME-INR;  Future  -     TRANSFERRIN; Future  -     URINALYSIS; Future    OA of L knee  surgery    History of tobacco abuse  Cont nicotine replacement    Other chronic pain  Cont methadone    Avascular necrosis of femoral head, unspecified laterality ([address]  Surgery recommended after L TKA    Perioperative Medication Management  Necessary medications can be given with a sip of water a few hours before surgery    NSAIDs   stop 3 days preoperatively and restart postoperatively    Anju Cornejo MD  9/11/2020

## 2020-09-12 LAB
ESTIMATED AVERAGE GLUCOSE: 114 MG/DL
HBA1C MFR BLD: 5.6 %
URINE CULTURE, ROUTINE: NORMAL

## 2020-09-16 ENCOUNTER — TELEPHONE (OUTPATIENT)
Dept: ORTHOPEDIC SURGERY | Age: 60
End: 2020-09-16

## 2020-09-18 ENCOUNTER — HOSPITAL ENCOUNTER (OUTPATIENT)
Dept: PHYSICAL THERAPY | Age: 60
Setting detail: THERAPIES SERIES
Discharge: HOME | End: 2020-09-18
Payer: COMMERCIAL

## 2020-09-18 PROCEDURE — 97110 THERAPEUTIC EXERCISES: CPT

## 2020-09-18 PROCEDURE — 97162 PT EVAL MOD COMPLEX 30 MIN: CPT

## 2020-09-18 NOTE — FLOWSHEET NOTE
[facility],  [address] [place]  Phone: [phone]  Fax [phone]    Physical Therapy Daily Treatment Note  Date:  2020    Patient Name:  Aleena Mathur    :  1960  MRN: [id number]  Restrictions/Precautions:    Medical/Treatment Diagnosis Information:  · Diagnosis: M17.0 (ICD-10-CM) - Primary osteoarthritis of both knees  · Treatment Diagnosis: M25.562 Left knee replacement; M25.662 Stiffness of left knee; R26.2 Difficulty in walking  Insurance/Certification information:  PT Insurance Information: 2020 [employer] MY CARE-$0CP - $0DED - PT/OT MED NEC -100% - NEEDS AUTH  Physician Information:  Referring Practitioner: Dr. Julianne Toure of care signed (Y/N):     Date of Patient follow up with Physician: 10/12/20    G-Code (if applicable):      Date G-Code Applied:     LEFS 89% disability    Progress Note: [x]  Yes  []  No  Next due by: Visit #10       Latex Allergy:  [x]NO      []YES  Preferred Language for Healthcare:   [x]English       []other:    Visit # Insurance Allowable   1 BMN- Needs auth      Pain level:  8/10     SUBJECTIVE:  See eval    OBJECTIVE: See eval   Observation:    Test measurements:      RESTRICTIONS/PRECAUTIONS:     Exercises/Interventions:     Therapeutic Ex Sets/reps Notes        Seated HS stretch 3x30 sec HEP   Seated calf stretch 3x30 sec HEP   Seated QS BLEs 10 sec 10x HEP   SLR FLX supine DND HEP   Seated heelslide with strap 10 sec 10x HEP   LAQ  1x10 HEP   minisquats DND HEP                                 Pt ed: expectations after surgery; surgical procedure (pt did not believe that his knee was going to be drilled into- showed him a model); needs to have transportation after surgery for approx 2 weeks d/t likely use of pain medication vs home PT; expectations for ROM after surgery; time-frame for recovery and return to work 15'         Manual Intervention activity       Electronically signed by: Oliverio Berger, PT, DPT

## 2020-09-21 ENCOUNTER — HOSPITAL ENCOUNTER (OUTPATIENT)
Age: 60
Setting detail: SPECIMEN
Discharge: HOME | End: 2020-09-21
Payer: COMMERCIAL

## 2020-09-21 ENCOUNTER — HOSPITAL ENCOUNTER (OUTPATIENT)
Dept: CT IMAGING | Age: 60
Discharge: HOME | End: 2020-09-21
Payer: COMMERCIAL

## 2020-09-21 PROCEDURE — G0480 DRUG TEST DEF 1-7 CLASSES: HCPCS

## 2020-09-21 PROCEDURE — 73700 CT LOWER EXTREMITY W/O DYE: CPT

## 2020-09-22 ENCOUNTER — OFFICE VISIT (OUTPATIENT)
Dept: PRIMARY CARE CLINIC | Age: 60
End: 2020-09-22
Payer: COMMERCIAL

## 2020-09-22 PROCEDURE — 99211 OFF/OP EST MAY X REQ PHY/QHP: CPT | Performed by: NURSE PRACTITIONER

## 2020-09-22 NOTE — PROGRESS NOTES
Luis Fernando Benitez received a viral test for COVID-19. They were educated on isolation and quarantine as appropriate. For any symptoms, they were directed to seek care from their PCP, given contact information to establish with a doctor, directed to an urgent care or the emergency room.

## 2020-09-22 NOTE — PATIENT INSTRUCTIONS

## 2020-09-23 LAB — SARS-COV-2, NAA: NOT DETECTED

## 2020-09-24 ENCOUNTER — TELEPHONE (OUTPATIENT)
Dept: ORTHOPEDIC SURGERY | Age: 60
End: 2020-09-24

## 2020-09-24 NOTE — TELEPHONE ENCOUNTER
Spoke to patient today. He is nicotine and metabolite test has not resulted yet. He has informed me that he is using a nicotine inhaler to help with smoking cessation. We have discussed the risks of continuing to use nicotine products before a total joint replacement. Patient became very agitated when I told him we would need to postpone his case until he can be completely nicotine free. He will need to pass a nicotine metabolite test before being placed back on his schedule. He then hung up on me.   Please postpone this patient's surgery until he can pass the nicotine metabolite test.

## 2020-09-25 LAB
3-OH-COTININE URINE: 2497 NG/ML
ANABASINE URINE: 4 NG/ML
COTININE, URINE: 1492 NG/ML
NICOTINE URINE: 673 NG/ML
NORNICOTINE URINE: 38 NG/ML

## 2020-09-28 ENCOUNTER — TELEPHONE (OUTPATIENT)
Dept: ORTHOPEDIC SURGERY | Age: 60
End: 2020-09-28

## 2020-09-28 NOTE — TELEPHONE ENCOUNTER
Spoke to patient. Informed him of test results. I told him he needs to be nicotine free for at least 6 weeks before and 6 weeks after surgery per our standard protocol. He will need to pass a nicotine metabolite test before we get him back on the schedule.

## 2020-10-09 ENCOUNTER — TELEPHONE (OUTPATIENT)
Dept: FAMILY MEDICINE CLINIC | Age: 60
End: 2020-10-09

## 2020-10-09 NOTE — TELEPHONE ENCOUNTER
Please have pt call his insurance and obtain a list of covered surgeons.  I can refer to anyone on the list

## 2020-10-09 NOTE — TELEPHONE ENCOUNTER
----- Message from Elizabeth Rodas sent at 10/9/2020 11:52 AM EDT -----  Subject: Message to Provider    QUESTIONS  Information for Provider? Patient's knees hurt and he needs to be referred   to a different surgeon. ---------------------------------------------------------------------------  --------------  [employer] DAPHNE  What is the best way for the office to contact you? OK to leave message on   voicemail  Preferred Call Back Phone Number? [phone]  ---------------------------------------------------------------------------  --------------  SCRIPT ANSWERS  Relationship to Patient?  Self

## 2020-10-13 ENCOUNTER — TELEPHONE (OUTPATIENT)
Dept: ORTHOPEDIC SURGERY | Age: 60
End: 2020-10-13

## 2020-10-13 NOTE — TELEPHONE ENCOUNTER
FAXED [facility] --ALL--  TO [facility] FOR PATIENT TO  .    CALLED PATIENT LETTING HIM KNOW THEY ARE READY

## 2020-10-20 ENCOUNTER — OFFICE VISIT (OUTPATIENT)
Dept: FAMILY MEDICINE CLINIC | Age: 60
End: 2020-10-20
Payer: COMMERCIAL

## 2020-10-20 VITALS
HEIGHT: 72 IN | HEART RATE: 68 BPM | SYSTOLIC BLOOD PRESSURE: 116 MMHG | DIASTOLIC BLOOD PRESSURE: 74 MMHG | BODY MASS INDEX: 30.91 KG/M2 | TEMPERATURE: 97.5 F | OXYGEN SATURATION: 98 % | RESPIRATION RATE: 16 BRPM | WEIGHT: 228.2 LBS

## 2020-10-20 PROCEDURE — 99215 OFFICE O/P EST HI 40 MIN: CPT | Performed by: REGISTERED NURSE

## 2020-10-20 ASSESSMENT — ENCOUNTER SYMPTOMS
COUGH: 0
SHORTNESS OF BREATH: 0
BACK PAIN: 1

## 2020-10-20 NOTE — LETTER
[address]  Phone: [phone]  Fax: [phone]    MICHELLE Mcnamara CNP        October 20, 2020     Patient: Dontrell Crocker   YOB: 1960   Date of Visit: 10/20/2020       To Whom It May Concern: It is my medical opinion that Kerrie Nieto requires a disability parking placard for the following reasons:  He cannot walk without assistance from another person or the use of an assistance device (cane, crutch, prosthetic device, wheelchair, etc.). Duration of need: 3 years    If you have any questions or concerns, please don't hesitate to call.     Sincerely,        MICHELLE Mcnamara CNP

## 2020-10-20 NOTE — PATIENT INSTRUCTIONS
Patient Education        Stopping Smoking: Care Instructions  Your Care Instructions     Cigarette smokers crave the nicotine in cigarettes. Giving it up is much harder than simply changing a habit. Your body has to stop craving the nicotine. It is hard to quit, but you can do it. There are many tools that people use to quit smoking. You may find that combining tools works best for you. There are several steps to quitting. First you get ready to quit. Then you get support to help you. After that, you learn new skills and behaviors to become a nonsmoker. For many people, a necessary step is getting and using medicine. Your doctor will help you set up the plan that best meets your needs. You may want to attend a smoking cessation program to help you quit smoking. When you choose a program, look for one that has proven success. Ask your doctor for ideas. You will greatly increase your chances of success if you take medicine as well as get counseling or join a cessation program.  Some of the changes you feel when you first quit tobacco are uncomfortable. Your body will miss the nicotine at first, and you may feel short-tempered and grumpy. You may have trouble sleeping or concentrating. Medicine can help you deal with these symptoms. You may struggle with changing your smoking habits and rituals. The last step is the tricky one: Be prepared for the smoking urge to continue for a time. This is a lot to deal with, but keep at it. You will feel better. Follow-up care is a key part of your treatment and safety. Be sure to make and go to all appointments, and call your doctor if you are having problems. It's also a good idea to know your test results and keep a list of the medicines you take. How can you care for yourself at home? · Ask your family, friends, and coworkers for support. You have a better chance of quitting if you have help and support.   · Join a support group, such as Nicotine Anonymous, for people who are trying to quit smoking. · Consider signing up for a smoking cessation program, such as the American Lung Association's Freedom from Smoking program.  · Get text messaging support. Go to the website at www.smokefree. gov to sign up for the [facility] program.  · Set a quit date. Pick your date carefully so that it is not right in the middle of a big deadline or stressful time. Once you quit, do not even take a puff. Get rid of all ashtrays and lighters after your last cigarette. Clean your house and your clothes so that they do not smell of smoke. · Learn how to be a nonsmoker. Think about ways you can avoid those things that make you reach for a cigarette. ? Avoid situations that put you at greatest risk for smoking. For some people, it is hard to have a drink with friends without smoking. For others, they might skip a coffee break with coworkers who smoke. ? Change your daily routine. Take a different route to work or eat a meal in a different place. · Cut down on stress. Calm yourself or release tension by doing an activity you enjoy, such as reading a book, taking a hot bath, or gardening. · Talk to your doctor or pharmacist about nicotine replacement therapy, which replaces the nicotine in your body. You still get nicotine but you do not use tobacco. Nicotine replacement products help you slowly reduce the amount of nicotine you need. These products come in several forms, many of them available over-the-counter:  ? Nicotine patches  ? Nicotine gum and lozenges  ? Nicotine inhaler  · Ask your doctor about bupropion (Wellbutrin) or varenicline (Chantix), which are prescription medicines. They do not contain nicotine. They help you by reducing withdrawal symptoms, such as stress and anxiety. · Some people find hypnosis, acupuncture, and massage helpful for ending the smoking habit. · Eat a healthy diet and get regular exercise.  Having healthy habits will help your body move past its craving for nicotine. · Be prepared to keep trying. Most people are not successful the first few times they try to quit. Do not get mad at yourself if you smoke again. Make a list of things you learned and think about when you want to try again, such as next week, next month, or next year. Where can you learn more? Go to https://TrekCafepenaewandrew.[employer]. org and sign in to your [employer] account. Enter V120 in the [employer] box to learn more about \"Stopping Smoking: Care Instructions. \"     If you do not have an account, please click on the \"Sign Up Now\" link. Current as of: March 12, 2020               Content Version: 12.6  © 2006-2020 [employer]. Care instructions adapted under license by [facility] ([facility]). If you have questions about a medical condition or this instruction, always ask your healthcare professional. [address] any warranty or liability for your use of this information.

## 2020-10-20 NOTE — PROGRESS NOTES
10/20/2020     Graciela Mata (:  1960) is a 61 y.o. male, here for evaluation of the following medical concerns:    HPI   Patient is here to establish care. He would like a letter for his handicap/disability sign for his car. He would also like a referral for Ortho. Patient has pain in bilateral knees, worse on the left. He states that he has trying to do his surgery for the past year. He states that he had difficulties one of the providers that he was working with. States he felt that this provider did not like him. He says that he did try to speak with the Ortho surgeon in charge of his care about this but decided that he needed to go and see someone else. Patient also says that he was started on a nicotine replacement. He also says that his nicotine level was the reason why he was unable to go through with surgery. Patient is also tearful during visit and returns to discussing the death of his son. This happened in . Patient is the father of a 60-year-old daughter, he states this is stressful. Chronic Joint Pain:  Patient presents for follow-up of pain in both knee(s). Pain is unchanged. On average, pain is perceived as severe (6-8 pain scale). Change in quality of symptoms: no.  Current treatment: brace/splint, surgery- Patient had surgery planned but it was cancelled due to elevated nictotine levels. Medication side effects: none. Recent diagnostic testing: xrays, CT. Review of Systems   Constitutional: Negative for fatigue and fever. Respiratory: Negative for cough and shortness of breath. Cardiovascular: Negative for chest pain. Musculoskeletal: Positive for arthralgias ( Pain in bilateral knees, pain is worse in the left knee. Stephen has been seen by Dr. Alexandra Aid for this not want to return to him. ), back pain and joint swelling. Psychiatric/Behavioral: Positive for dysphoric mood. Prior to Visit Medications    Medication Sig Taking? ASSESSMENT/PLAN:  1. Osteoarthritis of left knee, unspecified osteoarthritis type  Left knee osteoarthritis is worse then right knee but per patient needs knee replacement in both knees. - External Referral To Orthopedic Surgery    2. Osteoarthritis, unspecified osteoarthritis type, unspecified site  Patient does not want to return to [facility]. External referral given. - External Referral To Orthopedic Surgery    3. History of tobacco abuse  Patient education provided on stopping nicotine replacement. Patient is visibly upset and tearful during visit. His thoughts are tangential and patient has to be redirected to focus on care. He is having a difficult time dealing with the death of his son which happened in 2013. I did talk with patient about the behavioral health consultants available here in the office. Patient states that he does have someone to talk to already. Provided education on reducing nicotine supplements. Educated patient that the best way to stop using the nicotine replacement is to slowly reduce the amount used each day. Patient verbalizes understanding. Provided external Ortho referral for patient as he says he does not want to return to [facility]. Patient has knee braces on bilateral knees. He did ask about prescriptions for new knee braces. Instructed patient to schedule a visit with a new orthopedic surgeon, and discuss knee braces with his new surgeon. Letter provided to patient for disability/handicap designation for car. Patient agrees to plan and will follow up for any issues. More than 50% of time was spent counseling patient. Return in about 3 months (around 1/20/2021). An electronic signature was used to authenticate this note.     --MICHELLE Mcnamara - CNP on 10/20/2020 at 1:20 PM

## 2020-10-21 ENCOUNTER — TELEPHONE (OUTPATIENT)
Dept: FAMILY MEDICINE CLINIC | Age: 60
End: 2020-10-21

## 2020-10-21 NOTE — TELEPHONE ENCOUNTER
----- Message from Maxwell Jeffries sent at 10/21/2020 11:41 AM EDT -----  Subject: Message to Provider    QUESTIONS  Information for Provider? needs to know of a place close by that will test   him for the level of nicotine in his body   he saw a nurse named T last time he was there  ---------------------------------------------------------------------------  --------------  [address]  What is the best way for the office to contact you? OK to leave message on   voicemail  Preferred Call Back Phone Number? [phone]  ---------------------------------------------------------------------------  --------------  SCRIPT ANSWERS  Relationship to Patient?  Self

## 2020-10-21 NOTE — TELEPHONE ENCOUNTER
This can usually be done at any lab. I would call first before going to make sure. You can try the [address] at [facility].    [phone]

## 2020-10-22 ENCOUNTER — TELEPHONE (OUTPATIENT)
Dept: ORTHOPEDIC SURGERY | Age: 60
End: 2020-10-22

## 2020-11-18 ENCOUNTER — TELEPHONE (OUTPATIENT)
Dept: FAMILY MEDICINE CLINIC | Age: 60
End: 2020-11-18

## 2020-11-18 NOTE — TELEPHONE ENCOUNTER
Received call from [address]. They are assistance patient with transportation. She is requesting last office note and medication list to be faxed to [phone]. Verbal permission was obtained from patient. Fax confirmation was received.

## 2021-01-04 ENCOUNTER — TELEPHONE (OUTPATIENT)
Dept: FAMILY MEDICINE CLINIC | Age: 61
End: 2021-01-04

## 2021-01-04 NOTE — TELEPHONE ENCOUNTER
----- Message from Coleman Lee sent at 1/4/2021 11:28 AM EST -----  Subject: Appointment Request    Reason for Call: Routine Pre-Op    QUESTIONS  Type of Appointment? Established Patient  Reason for appointment request? No appointments available during search  Additional Information for Provider? patient needs to have a sooner appt   for preop clearance. after his surgeon got results of his MRI they moved   his surgery to 1/12/2021. No sooner appts available in [facility]   Please call patient to reschedule his preop. he is available anytime for   an appt.   ---------------------------------------------------------------------------  --------------  CALL BACK INFO  What is the best way for the office to contact you? OK to leave message on   voicemail  Preferred Call Back Phone Number? [phone]  ---------------------------------------------------------------------------  --------------  SCRIPT ANSWERS  Relationship to Patient? Self  Have your symptoms changed? No  Have you been diagnosed with   tested for   or told that you are suspected of having COVID-19 (Coronavirus)? No  Have you had a fever or taken medication to treat a fever within the past   3 days? No  Have you had a cough   shortness of breath or flu-like symptoms within the past 3 days? No  Do you currently have flu-like symptoms including fever or chills   cough   shortness of breath   or difficulty breathing   or new loss of taste or smell? No  (Service Expert  click yes below to proceed with [employer] As Usual   Scheduling)?  Yes

## 2021-01-28 ENCOUNTER — OFFICE VISIT (OUTPATIENT)
Dept: FAMILY MEDICINE CLINIC | Age: 61
End: 2021-01-28
Payer: COMMERCIAL

## 2021-01-28 VITALS
TEMPERATURE: 98.6 F | SYSTOLIC BLOOD PRESSURE: 110 MMHG | HEART RATE: 67 BPM | BODY MASS INDEX: 31.29 KG/M2 | WEIGHT: 231 LBS | OXYGEN SATURATION: 91 % | DIASTOLIC BLOOD PRESSURE: 62 MMHG | HEIGHT: 72 IN

## 2021-01-28 DIAGNOSIS — M17.12 OSTEOARTHRITIS OF LEFT KNEE, UNSPECIFIED OSTEOARTHRITIS TYPE: ICD-10-CM

## 2021-01-28 DIAGNOSIS — Z01.818 PREOP EXAMINATION: Primary | ICD-10-CM

## 2021-01-28 PROCEDURE — 99213 OFFICE O/P EST LOW 20 MIN: CPT | Performed by: REGISTERED NURSE

## 2021-01-28 NOTE — PROGRESS NOTES
[facility]  [phone]  Fax: [phone]   Pre-operative History and Physical      DIAGNOSIS:  Osteoarthritis of left knee    PROCEDURE:  Left Total Knee arthoplasty      History Obtained From:  patient    HISTORY OF PRESENT ILLNESS:    The patient is a 61 y.o. male with significant past medical history of osteoarthritis of left knee. I am seeing this patient for preop consultation for Dr. Matheus Sotelo       Past Medical History:   Diagnosis Date    Arthritis     Asthma     Depression     Fractured pelvis ([address]     Fractured rib     Hypertension     Irregular heartbeat     Pneumonia     Pneumothorax, left     Sleep apnea     did not tolerate CPAP     Past Surgical History:   Procedure Laterality Date    CARDIAC SURGERY      cardiac cath    COLONOSCOPY N/A 8/20/2019    COLONOSCOPY POLYPECTOMY REMOVAL HOT BIOPSY performed by Zofia oJse MD at [facility] Left     ulna    LAMINECTOMY      2 partial laminectomy    LUMBAR FUSION      LUMBAR SPINE SURGERY      pain stimulator placed in lower back left side L4-L5 then removed     Current Outpatient Medications   Medication Sig Dispense Refill    ALPRAZolam (XANAX) 2 MG tablet TK 1 T PO TID PRN AND 1 T HS PRN FOR AGITATION      traZODone (DESYREL) 100 MG tablet       methadone (DOLOPHINE) 5 MG tablet Take 5 mg by mouth every 4 hours as needed for Pain       No current facility-administered medications for this visit. Per his preop visit for same procedure 09/2020:    Learta Spina Cardiac RiskIndex  1. High risk surgical procedures (intraperitoneal, intrathoracic, suprainguinal vascular) no  2. History of ischemic heart disease (history of myocardial infarction,history of positive exercise test, current complaint of chest pain considered to be due to ischemia, use of nitrate therapy, EKG with pathological Q waves) no  3.  History of congestiveheart failure (history of congestive heart failure, changes  MUSCULOSKELETAL:  positive for  myalgias, arthralgias, pain and joint swelling- left and right knees and back. NEUROLOGICAL:  negative for headaches, dizziness and tremor    PHYSICAL EXAM:      /62   Pulse 67   Temp 98.6 °F (37 °C)   Ht 6' (1.829 m)   Wt 231 lb (104.8 kg)   SpO2 91%   BMI 31.33 kg/m²     CONSTITUTIONAL:  awake, alert, cooperative, no apparent distress, and appears stated age. Eyes:  Lids and lashes normal, pupils equal, round and reactive to light, extra ocular muscles intact, sclera clear, conjunctiva normal.    Head/ENT:  Normocephalic, without obvious abnormality, atramatic, sinuses nontender on palpation, external ears without lesions, oral pharynx with moist mucus membranes, tonsils without erythema or exudates, gums normal and has full bottom plate and full top plate. Neck:  Supple, symmetrical, trachea midline, no adenopathy, thyroid symmetric, not enlarged and no tenderness, skin normal.    Heart:  Normal apical impulse, regular rate and rhythm, normal S1 and S2, no S3 or S4, and no murmur noted. Lungs:  No increased work of breathing, good air exchange, clear to auscultation bilaterally, no crackles or wheezing. Abdomen:  No scars, normal bowel sounds, soft, non-distended, non-tender, no masses palpated, no hepatosplenomegally. Extremities:  No clubbing, cyanosis. +1 non-pitting edema in bilateral legs. NEUROLOGIC:  Awake, alert, oriented to name, place and time. Cranial nerves II-XII are grossly intact.           CBC with Differential:    Lab Results   Component Value Date    WBC 6.6 09/11/2020    RBC 5.05 09/11/2020    HGB 14.3 09/11/2020    HCT 42.7 09/11/2020     09/11/2020    MCV 84.4 09/11/2020    MCH 28.2 09/11/2020    MCHC 33.5 09/11/2020    RDW 14.4 09/11/2020    BANDSPCT 41 07/30/2015    LYMPHOPCT 31.5 09/11/2020    MONOPCT 5.6 09/11/2020    BASOPCT 0.3 09/11/2020    MONOSABS 0.4 09/11/2020    LYMPHSABS 2.1 09/11/2020    EOSABS 0.1 09/11/2020    BASOSABS 0.0 09/11/2020     CMP:    Lab Results   Component Value Date     09/11/2020    K 3.6 09/11/2020     09/11/2020    CO2 26 09/11/2020    BUN 16 09/11/2020    CREATININE 1.0 09/11/2020    GFRAA >60 09/11/2020    AGRATIO 1.4 03/29/2019    LABGLOM >60 09/11/2020    GLUCOSE 121 09/11/2020    PROT 6.4 03/29/2019    LABALBU 3.8 09/11/2020    CALCIUM 8.9 09/11/2020    BILITOT <0.2 03/29/2019    ALKPHOS 74 03/29/2019    AST 10 03/29/2019    ALT 10 03/29/2019       ASSESSMENT AND PLAN:    1. Patient is a 61 y.o. male with above specified procedure planned on 2/12/21 with Dr. Dr. Rivera Bolivar at [facility].   They will not require cardiology evaluation prior to procedure. 2. Stop ASA/NSAIDS medications 7-10 days prior to procedure. 3.Patient is cleared for surgery  4. Preop has been faxed to Dr. Rivera Bolivar office.     Eloy Perdomo, [address]  [phone]

## 2021-02-17 ENCOUNTER — TELEPHONE (OUTPATIENT)
Dept: ADMINISTRATIVE | Age: 61
End: 2021-02-17

## 2021-02-17 ENCOUNTER — HOSPITAL ENCOUNTER (EMERGENCY)
Age: 61
Discharge: HOME | End: 2021-02-18
Payer: COMMERCIAL

## 2021-02-17 ENCOUNTER — APPOINTMENT (OUTPATIENT)
Dept: GENERAL RADIOLOGY | Age: 61
End: 2021-02-17
Payer: COMMERCIAL

## 2021-02-17 VITALS
WEIGHT: 235 LBS | HEART RATE: 80 BPM | OXYGEN SATURATION: 94 % | DIASTOLIC BLOOD PRESSURE: 83 MMHG | RESPIRATION RATE: 14 BRPM | BODY MASS INDEX: 31.83 KG/M2 | SYSTOLIC BLOOD PRESSURE: 109 MMHG | HEIGHT: 72 IN | TEMPERATURE: 99 F

## 2021-02-17 DIAGNOSIS — T30.0 BURN: ICD-10-CM

## 2021-02-17 DIAGNOSIS — M25.562 LEFT KNEE PAIN, UNSPECIFIED CHRONICITY: Primary | ICD-10-CM

## 2021-02-17 LAB
A/G RATIO: 1.2 (ref 1.1–2.2)
ALBUMIN SERPL-MCNC: 3.3 G/DL (ref 3.4–5)
ALP BLD-CCNC: 85 U/L (ref 40–129)
ALT SERPL-CCNC: 15 U/L (ref 10–40)
ANION GAP SERPL CALCULATED.3IONS-SCNC: 7 MMOL/L (ref 3–16)
AST SERPL-CCNC: 14 U/L (ref 15–37)
BASOPHILS ABSOLUTE: 0 K/UL (ref 0–0.2)
BASOPHILS RELATIVE PERCENT: 0.6 %
BILIRUB SERPL-MCNC: 0.5 MG/DL (ref 0–1)
BUN BLDV-MCNC: 16 MG/DL (ref 7–20)
CALCIUM SERPL-MCNC: 8.8 MG/DL (ref 8.3–10.6)
CHLORIDE BLD-SCNC: 99 MMOL/L (ref 99–110)
CO2: 30 MMOL/L (ref 21–32)
CREAT SERPL-MCNC: 0.9 MG/DL (ref 0.8–1.3)
EOSINOPHILS ABSOLUTE: 0.2 K/UL (ref 0–0.6)
EOSINOPHILS RELATIVE PERCENT: 2.3 %
GFR AFRICAN AMERICAN: >60
GFR NON-AFRICAN AMERICAN: >60
GLOBULIN: 2.8 G/DL
GLUCOSE BLD-MCNC: 113 MG/DL (ref 70–99)
HCT VFR BLD CALC: 38.1 % (ref 40.5–52.5)
HEMOGLOBIN: 12.6 G/DL (ref 13.5–17.5)
LACTIC ACID: 1.1 MMOL/L (ref 0.4–2)
LYMPHOCYTES ABSOLUTE: 1.7 K/UL (ref 1–5.1)
LYMPHOCYTES RELATIVE PERCENT: 24.3 %
MCH RBC QN AUTO: 27.9 PG (ref 26–34)
MCHC RBC AUTO-ENTMCNC: 33 G/DL (ref 31–36)
MCV RBC AUTO: 84.6 FL (ref 80–100)
MONOCYTES ABSOLUTE: 0.5 K/UL (ref 0–1.3)
MONOCYTES RELATIVE PERCENT: 7.9 %
NEUTROPHILS ABSOLUTE: 4.5 K/UL (ref 1.7–7.7)
NEUTROPHILS RELATIVE PERCENT: 64.9 %
PDW BLD-RTO: 14.1 % (ref 12.4–15.4)
PLATELET # BLD: 179 K/UL (ref 135–450)
PMV BLD AUTO: 8.8 FL (ref 5–10.5)
POTASSIUM REFLEX MAGNESIUM: 4.1 MMOL/L (ref 3.5–5.1)
RBC # BLD: 4.5 M/UL (ref 4.2–5.9)
SODIUM BLD-SCNC: 136 MMOL/L (ref 136–145)
TOTAL PROTEIN: 6.1 G/DL (ref 6.4–8.2)
WBC # BLD: 6.9 K/UL (ref 4–11)

## 2021-02-17 PROCEDURE — 6370000000 HC RX 637 (ALT 250 FOR IP): Performed by: NURSE PRACTITIONER

## 2021-02-17 PROCEDURE — 6360000002 HC RX W HCPCS: Performed by: NURSE PRACTITIONER

## 2021-02-17 PROCEDURE — 96365 THER/PROPH/DIAG IV INF INIT: CPT

## 2021-02-17 PROCEDURE — 96367 TX/PROPH/DG ADDL SEQ IV INF: CPT

## 2021-02-17 PROCEDURE — 80053 COMPREHEN METABOLIC PANEL: CPT

## 2021-02-17 PROCEDURE — 2580000003 HC RX 258: Performed by: NURSE PRACTITIONER

## 2021-02-17 PROCEDURE — 73560 X-RAY EXAM OF KNEE 1 OR 2: CPT

## 2021-02-17 PROCEDURE — 85025 COMPLETE CBC W/AUTO DIFF WBC: CPT

## 2021-02-17 PROCEDURE — 83605 ASSAY OF LACTIC ACID: CPT

## 2021-02-17 PROCEDURE — 99285 EMERGENCY DEPT VISIT HI MDM: CPT

## 2021-02-17 RX ORDER — OXYCODONE HYDROCHLORIDE AND ACETAMINOPHEN 5; 325 MG/1; MG/1
1 TABLET ORAL ONCE
Status: COMPLETED | OUTPATIENT
Start: 2021-02-17 | End: 2021-02-17

## 2021-02-17 RX ORDER — ACETAMINOPHEN 325 MG/1
650 TABLET ORAL ONCE
Status: DISCONTINUED | OUTPATIENT
Start: 2021-02-17 | End: 2021-02-18 | Stop reason: HOSPADM

## 2021-02-17 RX ADMIN — OXYCODONE HYDROCHLORIDE AND ACETAMINOPHEN 1 TABLET: 5; 325 TABLET ORAL at 20:29

## 2021-02-17 RX ADMIN — VANCOMYCIN HYDROCHLORIDE 1000 MG: 1 INJECTION, POWDER, LYOPHILIZED, FOR SOLUTION INTRAVENOUS at 22:48

## 2021-02-17 RX ADMIN — PIPERACILLIN AND TAZOBACTAM 3375 MG: 3; .375 INJECTION, POWDER, LYOPHILIZED, FOR SOLUTION INTRAVENOUS at 22:11

## 2021-02-17 ASSESSMENT — PAIN SCALES - GENERAL
PAINLEVEL_OUTOF10: 10
PAINLEVEL_OUTOF10: 7

## 2021-02-18 ENCOUNTER — TELEPHONE (OUTPATIENT)
Dept: FAMILY MEDICINE CLINIC | Age: 61
End: 2021-02-18

## 2021-02-18 DIAGNOSIS — Z96.652 S/P TOTAL KNEE ARTHROPLASTY, LEFT: Primary | ICD-10-CM

## 2021-02-18 NOTE — ED NOTES
Patient is belligerent. Refuses to allow to be treated. Patient out of bed numerous times to be escorted back to bed for treatment. Patient had one round of antibiotics, and 75% of second round. Patient then demanded to either stop and remove IV, or he would take it out himself. Patient discharged with instructions.      Gama Bhat RN  02/17/21 0970

## 2021-02-18 NOTE — TELEPHONE ENCOUNTER
Kylee Coronado from [employer] is calling to confirm verbal orders for Nurse to evaluate and treat. Per medical assistant this was approved by Dario Serna.    Kylee Coronado was also informed that patient stated he was going to go back to [facility].

## 2021-02-18 NOTE — ED NOTES
[facility] orthopedist Dr. Madelon Dubin patients ortho MD  Per Mack MONTES Patient status post total left knee arthroplasty with purulent discharge increased pain   @[id number]       Alma Mcqueen  02/17/21 2900
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
## 2021-02-19 ENCOUNTER — CARE COORDINATION (OUTPATIENT)
Dept: CARE COORDINATION | Age: 61
End: 2021-02-19

## 2021-02-19 NOTE — CARE COORDINATION
Noted in Epic notes that patient was admitted to [facility] yesterday for altered mental status. He is currently hospitalized. Plan  No further outreach is necessary    Shukri Braun.  Seble Oliver, RN, BSN, [address] [place] [address]  [phone]

## 2021-02-19 NOTE — CARE COORDINATION
Placed first phone call to reach patient for ED FU and he was not available. LM and provided contact information. Plan  Make second phone call    Electa Look.  Sindi Schwartz RN, BSN, [address] [place] [address] Care  [phone]

## 2021-02-23 ENCOUNTER — TELEPHONE (OUTPATIENT)
Dept: FAMILY MEDICINE CLINIC | Age: 61
End: 2021-02-23

## 2021-02-23 ENCOUNTER — CARE COORDINATION (OUTPATIENT)
Dept: CARE COORDINATION | Age: 61
End: 2021-02-23

## 2021-02-23 DIAGNOSIS — Z96.652 S/P TOTAL KNEE ARTHROPLASTY, LEFT: Primary | ICD-10-CM

## 2021-02-23 RX ORDER — ASPIRIN 81 MG/1
81 TABLET ORAL 2 TIMES DAILY
COMMUNITY
Start: 2021-02-12 | End: 2021-03-14

## 2021-02-23 SDOH — ECONOMIC STABILITY: FOOD INSECURITY: WITHIN THE PAST 12 MONTHS, YOU WORRIED THAT YOUR FOOD WOULD RUN OUT BEFORE YOU GOT MONEY TO BUY MORE.: NEVER TRUE

## 2021-02-23 SDOH — ECONOMIC STABILITY: TRANSPORTATION INSECURITY
IN THE PAST 12 MONTHS, HAS LACK OF TRANSPORTATION KEPT YOU FROM MEETINGS, WORK, OR FROM GETTING THINGS NEEDED FOR DAILY LIVING?: NO

## 2021-02-23 SDOH — HEALTH STABILITY: PHYSICAL HEALTH: ON AVERAGE, HOW MANY MINUTES DO YOU ENGAGE IN EXERCISE AT THIS LEVEL?: 0 MIN

## 2021-02-23 SDOH — ECONOMIC STABILITY: INCOME INSECURITY: HOW HARD IS IT FOR YOU TO PAY FOR THE VERY BASICS LIKE FOOD, HOUSING, MEDICAL CARE, AND HEATING?: SOMEWHAT HARD

## 2021-02-23 SDOH — SOCIAL STABILITY: SOCIAL NETWORK: ARE YOU MARRIED, WIDOWED, DIVORCED, SEPARATED, NEVER MARRIED, OR LIVING WITH A PARTNER?: DIVORCED

## 2021-02-23 SDOH — SOCIAL STABILITY: SOCIAL NETWORK: HOW OFTEN DO YOU GET TOGETHER WITH FRIENDS OR RELATIVES?: MORE THAN THREE TIMES A WEEK

## 2021-02-23 SDOH — SOCIAL STABILITY: SOCIAL NETWORK
DO YOU BELONG TO ANY CLUBS OR ORGANIZATIONS SUCH AS CHURCH GROUPS UNIONS, FRATERNAL OR ATHLETIC GROUPS, OR SCHOOL GROUPS?: NO

## 2021-02-23 SDOH — SOCIAL STABILITY: SOCIAL NETWORK: HOW OFTEN DO YOU ATTEND CHURCH OR RELIGIOUS SERVICES?: NEVER

## 2021-02-23 SDOH — ECONOMIC STABILITY: FOOD INSECURITY: WITHIN THE PAST 12 MONTHS, THE FOOD YOU BOUGHT JUST DIDN'T LAST AND YOU DIDN'T HAVE MONEY TO GET MORE.: NEVER TRUE

## 2021-02-23 SDOH — HEALTH STABILITY: MENTAL HEALTH
STRESS IS WHEN SOMEONE FEELS TENSE, NERVOUS, ANXIOUS, OR CAN'T SLEEP AT NIGHT BECAUSE THEIR MIND IS TROUBLED. HOW STRESSED ARE YOU?: VERY MUCH

## 2021-02-23 NOTE — TELEPHONE ENCOUNTER
[facility] called informing office that patient was D/C to home from [facility] on 2.20.21.
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
## 2021-03-26 ENCOUNTER — CARE COORDINATION (OUTPATIENT)
Dept: CARE COORDINATION | Age: 61
End: 2021-03-26
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
## 2021-04-09 ENCOUNTER — CARE COORDINATION (OUTPATIENT)
Dept: CARE COORDINATION | Age: 61
End: 2021-04-09

## 2021-04-23 ENCOUNTER — CARE COORDINATION (OUTPATIENT)
Dept: CARE COORDINATION | Age: 61
End: 2021-04-23

## 2021-04-23 NOTE — CARE COORDINATION
Ambulatory Care Coordination Note  4/23/2021  CM Risk Score: 5  Charlson 10 Year Mortality Risk Score: 23%     ACC: Rishi Mcgowan RN    Summary Note: Call to patient to assess ongoing Care Coordination needs. Patient reports he saw ortho and is waiting for PA to have his other knee replaced. Shares that both are painful and \"rubs\" when he walks. Patient shares is frustration with his orthopedic problems. Reports he will need another back surgery in the future to replace hardware. \"I might as well start drinking again\". Reports that home therapy \"wasn't doing anything for me\", I needed machines\". Patient now going to [facility] on [address] [place] [address]  Reports \"it is only about 15 minutes and that is not enough\". When asked about use of his cane or walker. Patient shared that he went from crutches to a walker then a cane. Denies using an assistive device. Patient shares that his daughter may have been exposed to Covid. Waiting to here. Plans for him and his daughter to get the vaccine in May when she is 12. Interventions:  Educated on the importance of using his cane at all times  Encouraged patient to call PCP for an appointment    Plan:    Call in about 1 month, if no new needs consider graduation        Care Coordination Interventions    Program Enrollment: Complex Care  Referral from Primary Care Provider: No  Suggested Interventions and Community Resources  Fall Risk Prevention: In Process  Home Health Services: In Process  Medi Set or Pill Pack: In Process (Comment: Ramon his brother assists)  Occupational Therapy: In Process  Physical Therapy:  In Process         Goals Addressed                 This Visit's Progress       Care Coordination     Reduce Falls    Worsening     I will reduce my risk of falls by the following: Use walking aids like cane or walker  Follow through on orders for PT    Barriers: lack of support and medication side effects  Plan for overcoming my barriers: Working with BUFFY Chambers  Confidence: 5/10  Anticipated Goal Completion Date: June 2021            Prior to Admission medications    Medication Sig Start Date End Date Taking? Authorizing Provider   ALPRAZolam (XANAX) 2 MG tablet TK 1 T PO TID PRN AND 1 T HS PRN FOR AGITATION 7/1/20  Yes Historical Provider, MD   methadone (DOLOPHINE) 5 MG tablet Take 5 mg by mouth every 4 hours as needed for Pain   Yes Historical Provider, MD       No future appointments.    and   General Assessment    Do you have any symptoms that are causing concern?: Yes  Progression since Onset: Unchanged  Reported Symptoms: Pain (Comment: Knees)

## 2021-05-04 ENCOUNTER — TELEPHONE (OUTPATIENT)
Dept: FAMILY MEDICINE CLINIC | Age: 61
End: 2021-05-04

## 2021-05-04 NOTE — TELEPHONE ENCOUNTER
----- Message from Zulema Esparza sent at 5/4/2021  8:56 AM EDT -----  Subject: Message to Provider    QUESTIONS  Information for Provider? Patient refusal for RN Triage, asking to speak   with a provider about post op pain in knee, swelling, and bruising. Patient also was inquiring about physical therapy being terminated. ---------------------------------------------------------------------------  --------------  Jane Michelle INFO  What is the best way for the office to contact you? OK to leave message on   voicemail  Preferred Call Back Phone Number? [phone]  ---------------------------------------------------------------------------  --------------  SCRIPT ANSWERS  Relationship to Patient?  Self

## 2021-05-05 RX ORDER — CLONAZEPAM 2 MG/1
TABLET ORAL
COMMUNITY
Start: 2021-01-27 | End: 2021-08-20

## 2021-05-25 ENCOUNTER — CARE COORDINATION (OUTPATIENT)
Dept: CARE COORDINATION | Age: 61
End: 2021-05-25

## 2021-05-25 NOTE — CARE COORDINATION
Ambulatory Care Coordination Note  5/25/2021  CM Risk Score: 5  Charlson 10 Year Mortality Risk Score: 23%     ACC: Ian Park RN    Summary Note:     Call to patient to assess ongoing Care Coordination needs. Patient reports his L knee still clicks when he walks and he has R knee pain. Has an appointment with ortho \"tomorrow or the next day\" for gel injections. Shares his concerns about not being able to return to work. Noted slurring and slow speech. Patient asked daughter to tell ACM he wanted to get off the phone. Daughter declined. Patient came back on and said I need to go now and hung up the phone. Plan:  ACM will attempt additional outreach to attempt patient engagement        Care Coordination Interventions    Program Enrollment: Complex Care  Referral from Primary Care Provider: No  Suggested Interventions and Community Resources  Fall Risk Prevention: In Process  Home Health Services: In Process  Medi Set or Pill Pack: In Process (Comment: Ramon his brother assists)  Occupational Therapy: In Process  Physical Therapy: In Process         Goals Addressed                 This Visit's Progress       Care Coordination     Reduce Falls    No change     I will reduce my risk of falls by the following: Use walking aids like cane or walker  Follow through on orders for PT    Barriers: lack of support and medication side effects  Plan for overcoming my barriers: Working with Charlette Latif and Candido Smith  Confidence: 5/10  Anticipated Goal Completion Date: June 2021            Prior to Admission medications    Medication Sig Start Date End Date Taking? Authorizing Provider   clonazePAM (KLONOPIN) 2 MG tablet  1/27/21   Historical Provider, MD   ALPRAZolam (XANAX) 2 MG tablet TK 1 T PO TID PRN AND 1 T HS PRN FOR AGITATION 7/1/20   Historical Provider, MD   methadone (DOLOPHINE) 5 MG tablet Take 5 mg by mouth every 4 hours as needed for Pain    Historical Provider, MD       No future appointments.    and   General Assessment

## 2021-06-04 ENCOUNTER — CARE COORDINATION (OUTPATIENT)
Dept: CARE COORDINATION | Age: 61
End: 2021-06-04

## 2021-06-04 NOTE — CARE COORDINATION
Call to patient to assess ongoing Care Coordination needs. HIPPA compliant vm message left with purpose of call and my contact information.     Alejandrina Ohara RN, MSN  Ambulatory Care Manager  [phone]

## 2021-06-14 ENCOUNTER — CARE COORDINATION (OUTPATIENT)
Dept: CARE COORDINATION | Age: 61
End: 2021-06-14

## 2021-06-14 ASSESSMENT — LIFESTYLE VARIABLES: HOW OFTEN DO YOU HAVE A DRINK CONTAINING ALCOHOL: NEVER

## 2021-06-14 NOTE — CARE COORDINATION
Ambulatory Care Coordination Note  6/14/2021  CM Risk Score: 5  Charlson 10 Year Mortality Risk Score: 23%     ACC: Leslee Jenkins RN    Summary Note:     Call to patient to assess ongoing Care Coordination needs. Patient reports continued concerns with the results of his knee replacement. Reports he saw his ortho surgeon last week. Considering a second opinion. Speech sound impaired similar to a previous call with delays and difficulty completing sentences and occasional slurring. ACM inquired about alcohol intake. Patient reports stopping alcohol 30 years ago. Patient reports \"tinkling\" urine at times and at night. This is new since his surgery. Concerned d/t his father dying of prostate cancer. Can't remember the last time he had a prostate exam or annual blood work. Denies falls since last outreach. Using brace and walker. Interventions:    Encouraged patient to call today for PCP appointment-Provided office number to schedule  Encouraged patient to discuss family history with PCP    Plan:    F/U in 2-3 weeks  Graduate if no new goals identified    Care Coordination Interventions    Program Enrollment: Complex Care  Referral from Primary Care Provider: No  Suggested Interventions and Community Resources  Fall Risk Prevention: In Process  Home Health Services: In Process  Medi Set or Pill Pack: In Process (Comment: Ramon his brother assists)  Occupational Therapy: In Process  Physical Therapy:  In Process         Goals Addressed                 This Visit's Progress       Care Coordination     Reduce Falls    No change     I will reduce my risk of falls by the following: Use walking aids like cane or walker  Follow through on orders for PT    Barriers: lack of support and medication side effects  Plan for overcoming my barriers: Working with Geronimo Rivas and Marica Mortimer  Confidence: 5/10  Anticipated Goal Completion Date: June 2021            Prior to Admission medications    Medication Sig Start Date End Date Taking? Authorizing Provider   clonazePAM (KLONOPIN) 2 MG tablet  1/27/21   Historical Provider, MD   ALPRAZolam (XANAX) 2 MG tablet TK 1 T PO TID PRN AND 1 T HS PRN FOR AGITATION 7/1/20   Historical Provider, MD   methadone (DOLOPHINE) 5 MG tablet Take 5 mg by mouth every 4 hours as needed for Pain    Historical Provider, MD       No future appointments.    and   General Assessment    Do you have any symptoms that are causing concern?: Yes  Progression since Onset: Unchanged  Reported Symptoms: Other (Comment: urinary \"dribbling\")

## 2021-06-15 ENCOUNTER — NURSE TRIAGE (OUTPATIENT)
Dept: OTHER | Facility: CLINIC | Age: 61
End: 2021-06-15

## 2021-06-15 ENCOUNTER — OFFICE VISIT (OUTPATIENT)
Dept: FAMILY MEDICINE CLINIC | Age: 61
End: 2021-06-15
Payer: COMMERCIAL

## 2021-06-15 VITALS
TEMPERATURE: 97.2 F | BODY MASS INDEX: 31.25 KG/M2 | SYSTOLIC BLOOD PRESSURE: 134 MMHG | OXYGEN SATURATION: 94 % | WEIGHT: 230.4 LBS | HEART RATE: 72 BPM | DIASTOLIC BLOOD PRESSURE: 78 MMHG

## 2021-06-15 DIAGNOSIS — R32 URINARY INCONTINENCE, UNSPECIFIED TYPE: Primary | ICD-10-CM

## 2021-06-15 DIAGNOSIS — M17.12 OSTEOARTHRITIS OF LEFT KNEE, UNSPECIFIED OSTEOARTHRITIS TYPE: ICD-10-CM

## 2021-06-15 PROBLEM — R07.89 OTHER CHEST PAIN: Status: RESOLVED | Noted: 2019-05-02 | Resolved: 2021-06-15

## 2021-06-15 LAB
BILIRUBIN, POC: ABNORMAL
BLOOD URINE, POC: ABNORMAL
CLARITY, POC: CLEAR
COLOR, POC: YELLOW
GLUCOSE URINE, POC: ABNORMAL
KETONES, POC: ABNORMAL
LEUKOCYTE EST, POC: ABNORMAL
NITRITE, POC: ABNORMAL
PH, POC: 5.5
PROTEIN, POC: ABNORMAL
SPECIFIC GRAVITY, POC: 1.02
UROBILINOGEN, POC: ABNORMAL

## 2021-06-15 PROCEDURE — 99213 OFFICE O/P EST LOW 20 MIN: CPT | Performed by: REGISTERED NURSE

## 2021-06-15 PROCEDURE — 81002 URINALYSIS NONAUTO W/O SCOPE: CPT | Performed by: REGISTERED NURSE

## 2021-06-15 RX ORDER — TRAZODONE HYDROCHLORIDE 100 MG/1
TABLET ORAL
COMMUNITY
Start: 2021-05-23 | End: 2022-05-20

## 2021-06-15 RX ORDER — SERTRALINE HYDROCHLORIDE 100 MG/1
TABLET, FILM COATED ORAL
COMMUNITY
Start: 2021-04-14 | End: 2021-08-20

## 2021-06-15 RX ORDER — MELOXICAM 15 MG/1
TABLET ORAL
COMMUNITY
Start: 2021-05-24 | End: 2021-08-20

## 2021-06-15 RX ORDER — CIPROFLOXACIN 500 MG/1
500 TABLET, FILM COATED ORAL 2 TIMES DAILY
Qty: 20 TABLET | Refills: 0 | Status: SHIPPED | OUTPATIENT
Start: 2021-06-15 | End: 2021-06-25

## 2021-06-15 ASSESSMENT — ENCOUNTER SYMPTOMS: SHORTNESS OF BREATH: 0

## 2021-06-15 NOTE — PROGRESS NOTES
Patient: Serena Bean is a 61 y.o. male who presents today with the following Chief Complaint(s):  Chief Complaint   Patient presents with    Incontinence     trickling, can't feel when it happens, foul odor, urinated this morning 8:30am havent been able to do since, no pain, feels urge to go but then cant. Assessment/Plan:      Patient had difficulty urinating here in the office. He was given several cups of water and walked around the building. Patient was here for approximately an hour and was finally able to urinate a large amount. UA completed in office which shows blood. Urine sent for UA CNS in the lab. Patient started on ciprofloxacin due to concern for UTI versus prostatitis. Patient was instructed to go to the ED if he has continued difficulty urinating for long periods of time. Patient agrees to plan and will call for any questions, concerns or worsening symptoms. 1. Urinary incontinence, unspecified type    - ciprofloxacin (CIPRO) 500 MG tablet; Take 1 tablet by mouth 2 times daily for 10 days  Dispense: 20 tablet; Refill: 0  - Culture, Urine  - POCT Urinalysis no Micro    2. Osteoarthritis of left knee, unspecified osteoarthritis type  Patient would like a second opinion for his left knee. - [facility]      Return if symptoms worsen or fail to improve. HPI:     Patient is here for urinary incontinence and difficulty urinating which started last night. Patient says that this started while he was having intercourse and instead of ejaculating he states that he urinated. Since then he has been having foul-smelling urine and states that sometimes he dribbles and sometimes it is difficult for him to empty his bladder. He does have pressure in his low abdomen due to having to urinate. There is no pain with urination.           Current Outpatient Medications   Medication Sig Dispense Refill    meloxicam (MOBIC) 15 MG tablet TAKE 1 TABLET(15 MG) Coloration: Skin is not jaundiced or pale. Findings: No erythema. Neurological:      Mental Status: He is alert and oriented to person, place, and time. Psychiatric:         Mood and Affect: Mood normal.         Behavior: Behavior normal.         Thought Content: Thought content normal.         Judgment: Judgment normal.            Patient's past medical history, surgical history, family history, medications,  and allergies  were all reviewed and updated as appropriate today.     Patient Active Problem List   Diagnosis    Chronic pain disorder    Hx of decompressive lumbar laminectomy    Mediastinal hematoma    Recurrent major depressive disorder, in remission ([address]    Lower extremity edema    Avascular necrosis ([address]    Tobacco abuse    Anxiety     Past Medical History:   Diagnosis Date    Arthritis     Asthma     Atelectasis     Bilateral low back pain with sciatica 1/19/2016    Depression     Drug overdose 8/28/2016    Fractured pelvis ([address]     Fractured rib     Hypertension     Irregular heartbeat     Lumbar degenerative disc disease 1/19/2016    Methadone use ([address] 10/13/2014    Other chest pain 5/2/2019    Pneumonia     Pneumothorax, left     Polyp of transverse colon     Sleep apnea     did not tolerate CPAP      Past Surgical History:   Procedure Laterality Date    CARDIAC SURGERY      cardiac cath    COLONOSCOPY N/A 8/20/2019    COLONOSCOPY POLYPECTOMY REMOVAL HOT BIOPSY [facility]     ulna    LAMINECTOMY      2 partial laminectomy    LUMBAR FUSION      LUMBAR SPINE SURGERY      pain stimulator placed in lower back left side L4-L5 then removed       Family History   Problem Relation Age of Onset    Cancer Father     Stroke Sister     Cancer Brother       Allergies   Allergen Reactions    Demerol Hcl [Meperidine] Anaphylaxis    Gabapentin Shortness Of Breath    Zoloft [Sertraline] Other (See Comments)     headaches       This chart was generated using the [address] [place] [address] [address] dictation system. I created this record but it may contain dictation errors due to the limitation of the software.

## 2021-06-15 NOTE — TELEPHONE ENCOUNTER
Received call from Sissy Mckeon at [facility]) Osbaldo with Red Flag Complaint. Brief description of triage: see below. Triage indicates for patient to be seen in the office today. Care advice provided, patient verbalizes understanding; denies any other questions or concerns; instructed to call back for any new or worsening symptoms. Writer provided warm transfer to Jber at [facility] for appointment scheduling. Attention Provider: Thank you for allowing me to participate in the care of your patient. The patient was connected to triage in response to information provided to the [facility]. Please do not respond through this encounter as the response is not directed to a shared pool. Reason for Disposition   Bad or foul-smelling urine    Answer Assessment - Initial Assessment Questions  1. SYMPTOM: \"What's the main symptom you're concerned about? \" (e.g., frequency, incontinence)      Incontinence    2. ONSET: \"When did the  incontinence  start? \"      7 weeks ago. 3. PAIN: \"Is there any pain? \" If so, ask: \"How bad is it? \" (Scale: 1-10; mild, moderate, severe)      No     4. CAUSE: \"What do you think is causing the symptoms? \"      Unsure. 5. OTHER SYMPTOMS: \"Do you have any other symptoms? \" (e.g., fever, flank pain, blood in urine, pain with urination)      None. Patient is concerned because his father and brother had these symptoms and ultimately passed away. 6. PREGNANCY: \"Is there any chance you are pregnant? \" \"When was your last menstrual period? \"      n/a    Protocols used: URINARY SYMPTOMS-ADULT-[place]

## 2021-06-16 LAB — URINE CULTURE, ROUTINE: NORMAL

## 2021-06-17 DIAGNOSIS — R33.9 URINARY RETENTION: Primary | ICD-10-CM

## 2021-06-17 RX ORDER — TAMSULOSIN HYDROCHLORIDE 0.4 MG/1
0.4 CAPSULE ORAL DAILY
Qty: 30 CAPSULE | Refills: 3 | Status: SHIPPED | OUTPATIENT
Start: 2021-06-17 | End: 2021-08-20

## 2021-06-24 ENCOUNTER — CARE COORDINATION (OUTPATIENT)
Dept: CARE COORDINATION | Age: 61
End: 2021-06-24

## 2021-06-24 NOTE — CARE COORDINATION
Received call from patient. He asked where I work and what my role was after I answered and introduced. ACM explained and informed patient he was familiar to me and we have spoken. He denied knowledge of previous conversations. RN has concern the patient is impaired d/t slow speech, difficulty completing sentences and some slurred speech. Patient explained his history back to a work injury years ago. Shared his lack of satisfaction with his knee replacement in February. \"One leg is shorter\" \"leg is weak\" and he wants a second opinion. Explained the importance of Physical therapy following surgery. Explained that this prevents these types of complications    Patient reports he was only getting 15 minutes, it hurt his leg and he would prefer aquatic therapy with Dr. Arpita Islas would not recommend    ACM explained there is a prescribed treatment after a Knee replacement that is best care. Provided patient the phone number for [address] found on referral placed by PCP on 6/15/21 and recommended patient call for an appointment. ACM will f/u with patient in next 2 weeks.     Balwinder Tomlin RN, MSN  Ambulatory Care Manager  [phone]

## 2021-07-09 ENCOUNTER — CARE COORDINATION (OUTPATIENT)
Dept: CARE COORDINATION | Age: 61
End: 2021-07-09

## 2021-07-09 NOTE — CARE COORDINATION
Call to patient to assess ongoing Care Coordination needs. Patient currently at PT. He asks if ACM works with Dr. Vazquez Medina. Reminded patient I am the CM at [facility] where his PCP practices. Patient requests a call back next week. Informed patient Aline Sharif will be replacing me as I relocate beginning next week.     Emilie Fleischer RN, MSN  Ambulatory Care Manager  [phone]

## 2021-07-15 ENCOUNTER — CARE COORDINATION (OUTPATIENT)
Dept: CARE COORDINATION | Age: 61
End: 2021-07-15

## 2021-07-15 NOTE — CARE COORDINATION
Ambulatory Care Coordination Note  7/15/2021  CM Risk Score: 4  Charlson 10 Year Mortality Risk Score: 23%     ACC: Michelle Clifton RN    Summary Note: ACM completed follow up call with the patient. ACM explained she was taking over for Emmy Esteban and would resume follow up calls. Patient seemed to be groggy/ slow to respond on the phone. Patient was able to follow call and answer questions with delayed response. ACM asked patient if he had just woken up. Patient said no he was thinking about taking a nap but said he was getting up instead. Knee Pain: Patient said he has been experiencing a lot of bilateral knee pain. ACM asked if he was doing therapy recommended stretches and exercises at home. Patient said he was doing everything therapy recommended. ACM asked if he took pain medication prior to therapy. Patient said he did not have pain medication prescribed. ACM reviewed home medications list and Methadone is listed QID PRN for pain. ACM asked patient if he was taking this. Patient said yes and this does help some. ACM encouraged patient to take this 30 minutes prior to therapy to help with pain and reviewed RICE method with patient. ACM asked how much longer therapy lasted and patient said 4 weeks. ACM will follow up again with patient after therapy is completed to check progress. Patient declined any immediate needs at this time. Plan:    F/U call in 4 weeks with patient  F/U pain management and therapy progression        Care Coordination Interventions    Program Enrollment: Complex Care  Referral from Primary Care Provider: No  Suggested Interventions and Community Resources  Fall Risk Prevention: In Process  Home Health Services: In Process  Medi Set or Pill Pack: In Process (Comment: Ramon his brother assists)  Occupational Therapy: In Process (Comment: Patient has 4 weeks left of therapy)  Physical Therapy:  In Process (Comment: Patient has 4 weeks left of therapy)         Goals Addressed This Visit's Progress     Reduce Falls    No change     I will reduce my risk of falls by the following: Use walking aids like cane or walker  Follow through on orders for PT    Barriers: lack of support and medication side effects  Plan for overcoming my barriers: Working with Xavi Walekr and Kyle Parks  Confidence: 5/10  Anticipated Goal Completion Date: June 2021            Prior to Admission medications    Medication Sig Start Date End Date Taking? Authorizing Provider   tamsulosin (FLOMAX) 0.4 MG capsule Take 1 capsule by mouth daily 6/17/21   MICHELLE Rai CNP   meloxicam (MOBIC) 15 MG tablet TAKE 1 TABLET(15 MG) BY MOUTH DAILY 5/24/21   Historical Provider, MD   sertraline (ZOLOFT) 100 MG tablet TAKE ONE-HALF TABLET BY MOUTH EVERY MORNING THEN INCREASE TO 1 TABLET BY MOUTH EVERY MORNING AS DIRECTED 4/14/21   Historical Provider, MD   traZODone (DESYREL) 100 MG tablet TAKE 1 TABLET BY MOUTH EVERY NIGHT AT BEDTIME AS NEEDED FOR SLEEP 5/23/21   Historical Provider, MD   clonazePAM Lei Jody) 2 MG tablet  1/27/21   Historical Provider, MD   ALPRAZolam (XANAX) 2 MG tablet TK 1 T PO TID PRN AND 1 T HS PRN FOR AGITATION 7/1/20   Historical Provider, MD   methadone (DOLOPHINE) 5 MG tablet Take 5 mg by mouth every 4 hours as needed for Pain    Historical Provider, MD       No future appointments.    and   General Assessment    Do you have any symptoms that are causing concern?: Yes  Progression since Onset: Intermittent - Waxing/Waning  Reported Symptoms: Pain (Comment: Bilateral knee pain)

## 2021-08-12 ENCOUNTER — CARE COORDINATION (OUTPATIENT)
Dept: CARE COORDINATION | Age: 61
End: 2021-08-12

## 2021-08-12 NOTE — CARE COORDINATION
Ambulatory Care Coordination Note  8/12/2021  CM Risk Score: 4  Charlson 10 Year Mortality Risk Score: 23%     ACC: Sidney Viera RN    Summary Note: ACM called and completed care coordination call. Patient said he is doing well. Patient said that PT has ended and he continues to do therapy exercises that were reviewed with him. Patient said he will need to have his other knee replaced at some point. Patient declined any other needs at this time and does not need care coordination. [facility] will graduate patient at this time. [facility] did leave contact information with patient in-case he needs further assistance at a later date. Care Coordination Interventions    Program Enrollment: Complex Care  Referral from Primary Care Provider: No  Suggested Interventions and Community Resources  Fall Risk Prevention: In Process  Home Health Services: In Process  Medi Set or Pill Pack: Completed (Comment: Ramon his brother assists)  Occupational Therapy: Completed (Comment: Patient has 4 weeks left of therapy)  Physical Therapy: Completed (Comment: Patient has 4 weeks left of therapy)         Goals Addressed                 This Visit's Progress     COMPLETED: Reduce Falls    On track     I will reduce my risk of falls by the following: Use walking aids like cane or walker  Follow through on orders for PT    Barriers: lack of support and medication side effects  Plan for overcoming my barriers: Working with Moira Clifford and [facility]  Confidence: 5/10  Anticipated Goal Completion Date: June 2021            Prior to Admission medications    Medication Sig Start Date End Date Taking?  Authorizing Provider   tamsulosin (FLOMAX) 0.4 MG capsule Take 1 capsule by mouth daily 6/17/21   MICHELLE Sapp CNP   meloxicam (MOBIC) 15 MG tablet TAKE 1 TABLET(15 MG) BY MOUTH DAILY 5/24/21   Historical Provider, MD   sertraline (ZOLOFT) 100 MG tablet TAKE ONE-HALF TABLET BY MOUTH EVERY MORNING THEN INCREASE TO 1 TABLET BY MOUTH EVERY MORNING AS DIRECTED 4/14/21   Historical Provider, MD   traZODone (DESYREL) 100 MG tablet TAKE 1 TABLET BY MOUTH EVERY NIGHT AT BEDTIME AS NEEDED FOR SLEEP 5/23/21   Historical Provider, MD   clonazePAM (KLONOPIN) 2 MG tablet  1/27/21   Historical Provider, MD   ALPRAZolam (XANAX) 2 MG tablet TK 1 T PO TID PRN AND 1 T HS PRN FOR AGITATION 7/1/20   Historical Provider, MD   methadone (DOLOPHINE) 5 MG tablet Take 5 mg by mouth every 4 hours as needed for Pain    Historical Provider, MD       No future appointments.    and   General Assessment    Do you have any symptoms that are causing concern?: Yes  Progression since Onset: Intermittent - Waxing/Waning  Reported Symptoms: Pain (Comment: right knee pain patient said this will need to be replaced at sometpoint)

## 2021-08-17 ENCOUNTER — TELEPHONE (OUTPATIENT)
Dept: FAMILY MEDICINE CLINIC | Age: 61
End: 2021-08-17

## 2021-08-17 NOTE — TELEPHONE ENCOUNTER
-Pt wanting to know if his past labs show of any   Cancer markers?    -Pt has Future Appt 8/20/21 for discussion about knee replacement surgeries & requesting labs for cancer markers if needed.

## 2021-08-20 ENCOUNTER — OFFICE VISIT (OUTPATIENT)
Dept: FAMILY MEDICINE CLINIC | Age: 61
End: 2021-08-20
Payer: COMMERCIAL

## 2021-08-20 VITALS
OXYGEN SATURATION: 95 % | WEIGHT: 225.4 LBS | SYSTOLIC BLOOD PRESSURE: 98 MMHG | BODY MASS INDEX: 30.53 KG/M2 | HEART RATE: 84 BPM | DIASTOLIC BLOOD PRESSURE: 60 MMHG | HEIGHT: 72 IN

## 2021-08-20 DIAGNOSIS — M19.90 OSTEOARTHRITIS, UNSPECIFIED OSTEOARTHRITIS TYPE, UNSPECIFIED SITE: ICD-10-CM

## 2021-08-20 DIAGNOSIS — R73.9 HYPERGLYCEMIA: ICD-10-CM

## 2021-08-20 DIAGNOSIS — R60.0 LOWER LEG EDEMA: ICD-10-CM

## 2021-08-20 DIAGNOSIS — R35.1 NOCTURIA: ICD-10-CM

## 2021-08-20 DIAGNOSIS — R07.9 EXERTIONAL CHEST PAIN: ICD-10-CM

## 2021-08-20 DIAGNOSIS — R53.81 MALAISE: Primary | ICD-10-CM

## 2021-08-20 LAB
A/G RATIO: 1.5 (ref 1.1–2.2)
ALBUMIN SERPL-MCNC: 4 G/DL (ref 3.4–5)
ALP BLD-CCNC: 82 U/L (ref 40–129)
ALT SERPL-CCNC: 12 U/L (ref 10–40)
ANION GAP SERPL CALCULATED.3IONS-SCNC: 12 MMOL/L (ref 3–16)
AST SERPL-CCNC: 14 U/L (ref 15–37)
BASOPHILS ABSOLUTE: 0 K/UL (ref 0–0.2)
BASOPHILS RELATIVE PERCENT: 0.5 %
BILIRUB SERPL-MCNC: 0.3 MG/DL (ref 0–1)
BILIRUBIN, POC: ABNORMAL
BLOOD URINE, POC: ABNORMAL
BUN BLDV-MCNC: 14 MG/DL (ref 7–20)
CALCIUM SERPL-MCNC: 9.2 MG/DL (ref 8.3–10.6)
CHLORIDE BLD-SCNC: 100 MMOL/L (ref 99–110)
CLARITY, POC: CLEAR
CO2: 29 MMOL/L (ref 21–32)
COLOR, POC: ABNORMAL
CREAT SERPL-MCNC: 0.9 MG/DL (ref 0.8–1.3)
EOSINOPHILS ABSOLUTE: 0.1 K/UL (ref 0–0.6)
EOSINOPHILS RELATIVE PERCENT: 0.8 %
GFR AFRICAN AMERICAN: >60
GFR NON-AFRICAN AMERICAN: >60
GLOBULIN: 2.6 G/DL
GLUCOSE BLD-MCNC: 73 MG/DL (ref 70–99)
GLUCOSE URINE, POC: ABNORMAL
HCT VFR BLD CALC: 43.5 % (ref 40.5–52.5)
HEMOGLOBIN: 14.4 G/DL (ref 13.5–17.5)
KETONES, POC: ABNORMAL
LEUKOCYTE EST, POC: ABNORMAL
LYMPHOCYTES ABSOLUTE: 2.3 K/UL (ref 1–5.1)
LYMPHOCYTES RELATIVE PERCENT: 26.3 %
MCH RBC QN AUTO: 27.7 PG (ref 26–34)
MCHC RBC AUTO-ENTMCNC: 33.1 G/DL (ref 31–36)
MCV RBC AUTO: 83.6 FL (ref 80–100)
MONOCYTES ABSOLUTE: 0.5 K/UL (ref 0–1.3)
MONOCYTES RELATIVE PERCENT: 5.5 %
NEUTROPHILS ABSOLUTE: 5.9 K/UL (ref 1.7–7.7)
NEUTROPHILS RELATIVE PERCENT: 66.9 %
NITRITE, POC: ABNORMAL
PDW BLD-RTO: 14.8 % (ref 12.4–15.4)
PH, POC: 5.5
PLATELET # BLD: 190 K/UL (ref 135–450)
PMV BLD AUTO: 9.4 FL (ref 5–10.5)
POTASSIUM SERPL-SCNC: 4.3 MMOL/L (ref 3.5–5.1)
PROSTATE SPECIFIC ANTIGEN: 0.66 NG/ML (ref 0–4)
PROTEIN, POC: ABNORMAL
RBC # BLD: 5.2 M/UL (ref 4.2–5.9)
SODIUM BLD-SCNC: 141 MMOL/L (ref 136–145)
SPECIFIC GRAVITY, POC: >=1.03
TOTAL PROTEIN: 6.6 G/DL (ref 6.4–8.2)
UROBILINOGEN, POC: ABNORMAL
WBC # BLD: 8.9 K/UL (ref 4–11)

## 2021-08-20 PROCEDURE — 81002 URINALYSIS NONAUTO W/O SCOPE: CPT | Performed by: REGISTERED NURSE

## 2021-08-20 PROCEDURE — 99214 OFFICE O/P EST MOD 30 MIN: CPT | Performed by: REGISTERED NURSE

## 2021-08-20 PROCEDURE — 36415 COLL VENOUS BLD VENIPUNCTURE: CPT | Performed by: REGISTERED NURSE

## 2021-08-20 RX ORDER — CITALOPRAM 20 MG/1
TABLET ORAL
COMMUNITY
Start: 2021-06-25 | End: 2021-12-02

## 2021-08-20 ASSESSMENT — ENCOUNTER SYMPTOMS
COLOR CHANGE: 0
COUGH: 0
GASTROINTESTINAL NEGATIVE: 1
BACK PAIN: 1
SHORTNESS OF BREATH: 0

## 2021-08-20 NOTE — PROGRESS NOTES
Patient: Delia Ross is a 61 y.o. male who presents today with the following Chief Complaint(s):  Chief Complaint   Patient presents with    Advice Only       Assessment/Plan:  1. Malaise  He has been feeling generally unwell. Basic blood work drawn in office.  - CBC Auto Differential  - Comprehensive Metabolic Panel    2. Nocturia  He is concerned about changes in urination specifically having to go frequently at night. He did see urology but says he does not think any urine tests were run. Will check his urine today in office as well as lab work. - CBC Auto Differential  - POCT Urinalysis no Micro  - PSA, Prostatic Specific Antigen    3. Osteoarthritis, unspecified osteoarthritis type, unspecified site  Patient would like a second opinion. Referral placed. - Andrea Starks MD, Orthopedic Surgery, [facility]    4. Hyperglycemia    - Hemoglobin A1C    5. Exertional chest pain  Due to cardiac history and report of new exertional chest pain will send for echo stress test.  - ECHO Stress Test; Future    6. Lower leg edema  No lower leg edema in office. Swelling could be due to recent knee surgery and osteoarthritis of bilateral knees. - ECHO Stress Test; Future      Return if symptoms worsen or fail to improve. HPI:     He is here to talk about his knee surgeries. He says he had issues with his left knee after surgery. He says he is supposed to have surgery on his right knee. He would also like blood work because he feels that something is wrong and he wants to know if he has cancer. He says when he coughs he has had pain in his chest a few times. He saw urology for issues with urinating. He has been getting up to urinate at night and his sexual desire is decreased. He says his urine smells foul. He would like a referral for a new ortho specialist for his knees. He sees Ricci for his spine.        Current Outpatient Medications   Medication Sig Dispense Refill    citalopram (CELEXA) 20 MG tablet TAKE 1 TABLET BY MOUTH EVERY MORNING      ALPRAZolam (XANAX) 2 MG tablet TK 1 T PO TID PRN AND 1 T HS PRN FOR AGITATION      methadone (DOLOPHINE) 5 MG tablet Take 5 mg by mouth every 4 hours as needed for Pain      traZODone (DESYREL) 100 MG tablet TAKE 1 TABLET BY MOUTH EVERY NIGHT AT BEDTIME AS NEEDED FOR SLEEP (Patient not taking: Reported on 8/20/2021)       No current facility-administered medications for this visit. Review of Systems   Constitutional: Negative for fatigue and fever. Respiratory: Negative for cough and shortness of breath. Cardiovascular: Positive for chest pain ( chest pain with activity). Negative for palpitations. Gastrointestinal: Negative. Genitourinary: Negative for dysuria, flank pain and hematuria. Nocturia     Musculoskeletal: Positive for arthralgias ( bilateral knee pain) and back pain. Negative for myalgias and neck pain. Skin: Negative for color change, pallor and rash. Neurological: Negative for light-headedness and headaches. Vitals:    08/20/21 1420   BP: 98/60   Pulse: 84   SpO2: 95%   Weight: 225 lb 6.4 oz (102.2 kg)   Height: 6' (1.829 m)     Physical Exam  Constitutional:       General: He is not in acute distress. Appearance: Normal appearance. He is not ill-appearing. HENT:      Head: Normocephalic and atraumatic. Cardiovascular:      Rate and Rhythm: Normal rate and regular rhythm. Pulses: Normal pulses. Heart sounds: Normal heart sounds. No murmur heard. No friction rub. No gallop. Pulmonary:      Effort: Pulmonary effort is normal. No respiratory distress. Breath sounds: Normal breath sounds. No stridor. No wheezing, rhonchi or rales. Musculoskeletal:         General: Normal range of motion. Cervical back: Normal range of motion. Right lower leg: No edema. Left lower leg: No edema. Lymphadenopathy:      Cervical: No cervical adenopathy.    Skin:     General: Skin is warm and dry. Coloration: Skin is not jaundiced or pale. Findings: No erythema. Neurological:      General: No focal deficit present. Mental Status: He is alert and oriented to person, place, and time. Psychiatric:         Mood and Affect: Mood normal.         Behavior: Behavior normal.         Thought Content: Thought content normal.         Judgment: Judgment normal.        Patient's past medical history, surgical history, family history, medications,  and allergies  were all reviewed and updated as appropriate today.     Patient Active Problem List   Diagnosis    Chronic pain disorder    Hx of decompressive lumbar laminectomy    Mediastinal hematoma    Recurrent major depressive disorder, in remission ([address]    Lower extremity edema    Avascular necrosis ([address]    Tobacco abuse    Anxiety     Past Medical History:   Diagnosis Date    Arthritis     Asthma     Atelectasis     Bilateral low back pain with sciatica 1/19/2016    Depression     Drug overdose 8/28/2016    Fractured pelvis ([address]     Fractured rib     Hypertension     Irregular heartbeat     Lumbar degenerative disc disease 1/19/2016    Methadone use ([address] 10/13/2014    Other chest pain 5/2/2019    Pneumonia     Pneumothorax, left     Polyp of transverse colon     Sleep apnea     did not tolerate CPAP      Past Surgical History:   Procedure Laterality Date    CARDIAC SURGERY      cardiac cath    COLONOSCOPY N/A 8/20/2019    COLONOSCOPY POLYPECTOMY REMOVAL HOT BIOPSY [facility]     ulna    LAMINECTOMY      2 partial laminectomy    LUMBAR FUSION      LUMBAR SPINE SURGERY      pain stimulator placed in lower back left side L4-L5 then removed       Family History   Problem Relation Age of Onset    Cancer Father     Stroke Sister     Cancer Brother       Allergies   Allergen Reactions    Demerol Hcl [Meperidine] Anaphylaxis    Gabapentin Shortness Of Breath    Zoloft [Sertraline] Other (See Comments)     headaches       This chart was generated using the Dragon dictation system. I created this record but it may contain dictation errors due to the limitation of the software.

## 2021-08-21 LAB
ESTIMATED AVERAGE GLUCOSE: 119.8 MG/DL
HBA1C MFR BLD: 5.8 %

## 2021-08-22 LAB — URINE CULTURE, ROUTINE: NORMAL

## 2021-08-26 ENCOUNTER — TELEPHONE (OUTPATIENT)
Dept: FAMILY MEDICINE CLINIC | Age: 61
End: 2021-08-26

## 2021-08-26 DIAGNOSIS — Z98.890 HX OF DECOMPRESSIVE LUMBAR LAMINECTOMY: Primary | ICD-10-CM

## 2021-08-26 DIAGNOSIS — G89.29 CHRONIC LOW BACK PAIN WITH SCIATICA, SCIATICA LATERALITY UNSPECIFIED, UNSPECIFIED BACK PAIN LATERALITY: ICD-10-CM

## 2021-08-26 DIAGNOSIS — M54.40 CHRONIC LOW BACK PAIN WITH SCIATICA, SCIATICA LATERALITY UNSPECIFIED, UNSPECIFIED BACK PAIN LATERALITY: ICD-10-CM

## 2021-08-26 NOTE — TELEPHONE ENCOUNTER
Patient is requesting a referral for Dr. Key Alicea @ [address] [place] [address]. Patient stated he has seen Dr. Key Alicea before and would like to go back to him for his knee. If he can't be seen by Key Alicea he will see Fer Rincon as referred to previously.       Please fax referral to [phone]

## 2021-08-27 NOTE — TELEPHONE ENCOUNTER
Pt states that he will not be seeing Dr Zaina Keith for his knee. It will be for his back. He did his back surgery in 1992 on L5. Pt wants to see him again since he did his prior back surgery. Pt said he is having trouble with L4, just like before with L5.

## 2021-09-09 ENCOUNTER — OFFICE VISIT (OUTPATIENT)
Dept: ORTHOPEDIC SURGERY | Age: 61
End: 2021-09-09
Payer: COMMERCIAL

## 2021-09-09 VITALS — BODY MASS INDEX: 30.48 KG/M2 | HEIGHT: 72 IN | WEIGHT: 225 LBS

## 2021-09-09 DIAGNOSIS — M25.561 RIGHT KNEE PAIN, UNSPECIFIED CHRONICITY: ICD-10-CM

## 2021-09-09 DIAGNOSIS — Z96.652 PAIN DUE TO TOTAL LEFT KNEE REPLACEMENT, INITIAL ENCOUNTER (HCC): ICD-10-CM

## 2021-09-09 DIAGNOSIS — M17.11 PRIMARY OSTEOARTHRITIS OF RIGHT KNEE: ICD-10-CM

## 2021-09-09 DIAGNOSIS — T84.84XA PAIN DUE TO TOTAL LEFT KNEE REPLACEMENT, INITIAL ENCOUNTER (HCC): ICD-10-CM

## 2021-09-09 DIAGNOSIS — Z96.652 HISTORY OF TOTAL KNEE ARTHROPLASTY, LEFT: Primary | ICD-10-CM

## 2021-09-09 DIAGNOSIS — F17.200 SMOKER: ICD-10-CM

## 2021-09-09 PROCEDURE — 99214 OFFICE O/P EST MOD 30 MIN: CPT | Performed by: ORTHOPAEDIC SURGERY

## 2021-09-09 NOTE — PROGRESS NOTES
Dr Janay Mayfield      Date /Time 9/9/2021       11:27 AM EDT  Name Bala Worrell             1960   Location  [facility]  MRN [id number]                Chief Complaint   Patient presents with    Pain     Right  - Np    (left TKR 02/2021 [facility])         History of Present Illness  Bala Worrell is a 61 y.o. male who presents with  bilateral knee pain,. Sent in consultation by MICHELLE Novoa CNP,. Occupation: Disabled  Injury Mechanism:  none. Worker's Comp. & legal issues:   none. Previous Treatments: Ice, Heat, NSAIDs and pain medication    Patient is here with a chief complaint of left knee pain. Patient's left knee has been painful for many years. He did have a left total knee arthroplasty done February 12, 2021 by Dr. Marine Diaz at [facility].  Postoperatively he suffered from significant swelling. He did share pictures which demonstrated fracture blisters present. He also states the swelling was so severe that they had to cut his JUWAN hose and shoe off of them. He did have physical therapy but it did not start for approximately a week. He suffers from pain and loss of motion. Denies any fever or chills. Patient is also here with a chief complaint of right knee pain. Patient's right knee also has been painful for many years. His pain is concentrated over the medial aspect. He has had cortisone injections and viscosupplementation injections without improvement. He is currently using an  brace without help. He has had no recent injury or trauma. Patient does have several factors that are notable in his history. He did have a Workmen's Compensation claim for his lumbar spine. He eventually went on cortication bilateral knees were amended to the claim. He has settled this claim and no longer has any open claims for his knees. Also patient is a smoker.   In addition he does take methadone and suffers from continued depression due to loss of his son in 2018.    Past History  Past Medical History:   Diagnosis Date    Arthritis     Asthma     Atelectasis     Bilateral low back pain with sciatica 1/19/2016    Depression     Drug overdose 8/28/2016    [facility]     Fractured rib     Hypertension     Irregular heartbeat     Lumbar degenerative disc disease 1/19/2016    Methadone use ([facility] 10/13/2014    Other chest pain 5/2/2019    Pneumonia     Pneumothorax, left     Polyp of transverse colon     Sleep apnea     did not tolerate CPAP     Past Surgical History:   Procedure Laterality Date    CARDIAC SURGERY      cardiac cath    COLONOSCOPY N/A 8/20/2019    COLONOSCOPY POLYPECTOMY REMOVAL HOT BIOPSY performed [facility] Paulette Gilbert [facility] Left     ulna    LAMINECTOMY      2 partial laminectomy    LUMBAR FUSION      LUMBAR SPINE SURGERY      pain stimulator placed in lower back left side L4-L5 then removed     Social History     Tobacco Use    Smoking status: Light Tobacco Smoker     Packs/day: 0.75     Years: 15.00     Pack years: 11.25     Types: Cigarettes    Smokeless tobacco: Never Used   Substance Use Topics    Alcohol use: No      Current Outpatient Medications on File Prior to Visit   Medication Sig Dispense Refill    citalopram (CELEXA) 20 MG tablet TAKE 1 TABLET BY MOUTH EVERY MORNING      traZODone (DESYREL) 100 MG tablet TAKE 1 TABLET BY MOUTH EVERY NIGHT AT BEDTIME AS NEEDED FOR SLEEP (Patient not taking: Reported on 8/20/2021)      ALPRAZolam (XANAX) 2 MG tablet TK 1 T PO TID PRN AND 1 T HS PRN FOR AGITATION      methadone (DOLOPHINE) 5 MG tablet Take 5 mg by mouth every 4 hours as needed for Pain       No current facility-administered medications on file prior to visit.       ASCVD 10-YEAR RISK SCORE  The 10-year ASCVD risk score (Arlene Montanez., et al., 2013) is: 9.1%    Values used to calculate the score:      Age: 61 years      Sex: Male      Is Non-  American: No      Diabetic: No      Tobacco smoker: Yes      Systolic Blood Pressure: 98 mmHg      Is BP treated: No      HDL Cholesterol: 47 mg/dL      Total Cholesterol: 199 mg/dL     Review of Systems  10-point ROS is negative other than HPI. Physical Exam  Based off 1997 Exam Criteria  Ht 6' (1.829 m)   Wt 225 lb (102.1 kg)   BMI 30.52 kg/m²      Constitutional:       General: He is not in acute distress. Appearance: Normal appearance. Cardiovascular:      Rate and Rhythm: Normal rate and regular rhythm. Pulses: Normal pulses. Pulmonary:      Effort: Pulmonary effort is normal. No respiratory distress. Neurological:      Mental Status: He is alert and oriented to person, place, and time. Mental status is at baseline. Musculoskeletal:  Gait:  antalgic  Spencer Hip: Examination of the right and left hip reveals intact skin. The patient demonstrates full painless range of motion with regards to flexion, abduction, internal and external rotation. There is no tenderness about the greater trochanter. R Knee: Examination of the right knee reveals intact skin. There is focal tenderness over the medial greater than lateral joint line. Painful range of motion . No gross instability to either varus or valgus stress test.  L Knee: Examination of the left knee reveals intact skin. There is an incision consistent with previous total knee arthroplasty. Painful range of motion 5-90. No gross instability to either varus or valgus stress test with the knee fully extended. There is mid flexion instability compared to full extension. Quad atrophy present. Imaging  Bilateral Knee: [address] [facility] [facility]  Radiographs:   X-rays ordered today reviewed of the right knee. 4 views. Standing AP, standing AP flex, lateral, and skyline views. They demonstrate no evidence of fractures or dislocations. There is advanced medial joint space thinning with osteophyte formation.   Patient does also have osteophyte formation patellofemoral joint with a bipartite patella    X-rays are also ordered and reviewed of the left knee. 3 views. Standing AP, lateral, and skyline views. No evidence of fractures or dislocations. There is no evidence of loosening or eccentric wear. Assessment and Plan  Inessa Stanley was seen today for pain. Diagnoses and all orders for this visit:    History of total knee arthroplasty, left  -     XR KNEE LEFT (3 VIEWS); Future    Right knee pain, unspecified chronicity  -     XR KNEE RIGHT (MIN 4 VIEWS); Future    Pain due to total left knee replacement, initial encounter ([facility]    Primary osteoarthritis of right knee        In terms of patient's right knee he will need a total knee arthroplasty at some point in the future. Unfortunately he does have several factors that need medical optimization before hand. First he will need to be nicotine free for least 6 weeks before and 6 weeks after surgery. He will need to pass at least 1 nicotine test before he can get signed up for surgery. In addition he takes a large amount of pain medication for his back including but not limited to methadone. In addition patient did have a large amount of lower extremity swelling after his previous total knee arthroplasty. He will likely need a vascular consultation. He will also need clearance from his psychologist.  We have also asked him to decrease the use of his chronic pain medication. This will make it extremely difficult to impossible to control his pain afterwards if he continues to take high-dose narcotic pain medication. We have placed the patient into physical therapy. I discussed with Bala Worrell that his history, symptoms, signs and imaging are most consistent with knee arthritis and previous TKA replacement.     We reviewed the natural history of these conditions and treatment options ranging from conservative measures (rest, icing, activity modification, physical therapy, pain meds, cortisone injection) to surgical options. In terms of treatment, I recommended continuing with rest, icing, avoidance of painful activities, NSAIDs or pain meds as tolerated, and physical therapy. If these are not effective, cortisone injection can be considered. We discussed surgical options as well, should conservative measures fail. Electronically signed by Keila Villafuerte MD on 9/9/2021 at 11:27 AM  This dictation was generated by voice recognition computer software. Although all attempts are made to edit the dictation for accuracy, there may be errors in the transcription that are not intended.

## 2021-09-20 ENCOUNTER — TELEPHONE (OUTPATIENT)
Dept: ORTHOPEDIC SURGERY | Age: 61
End: 2021-09-20

## 2021-09-21 ENCOUNTER — TELEPHONE (OUTPATIENT)
Dept: FAMILY MEDICINE CLINIC | Age: 61
End: 2021-09-21

## 2021-09-21 NOTE — TELEPHONE ENCOUNTER
Pt requesting Urine Lab Result and requesting Pancreas Ultrasound. Pt states his Urologist wants him to get this ultrasound. Future VV appt 9/23/21.

## 2021-09-21 NOTE — TELEPHONE ENCOUNTER
Last urine test from 8/20 and pt was notified. Pt has upcoming VV appt, wait for appt to discuss the US?

## 2021-09-21 NOTE — TELEPHONE ENCOUNTER
Pt is now scheduled with [facility] for a pre op for a urology procedure.  Notes state that urologist ordered [employer]

## 2021-09-24 NOTE — TELEPHONE ENCOUNTER
Pt calling in stated he had a missed call from office and returning call. Not sure what it was regarding. Unclear from messages what patient was contacted in regards to. Pt did want to let provider know that he has felt horrible since he got the [employer] vaccines. Since the day after. And wonders if this could be the cause of why he feels so bad all the time? Also stated that his infection seems to be gone.     [phone] pt phone

## 2021-09-27 ENCOUNTER — OFFICE VISIT (OUTPATIENT)
Dept: FAMILY MEDICINE CLINIC | Age: 61
End: 2021-09-27
Payer: COMMERCIAL

## 2021-09-27 VITALS
BODY MASS INDEX: 30.33 KG/M2 | DIASTOLIC BLOOD PRESSURE: 72 MMHG | WEIGHT: 223.6 LBS | TEMPERATURE: 96.9 F | OXYGEN SATURATION: 91 % | SYSTOLIC BLOOD PRESSURE: 110 MMHG | HEART RATE: 73 BPM

## 2021-09-27 DIAGNOSIS — Z01.818 PRE-OP EXAM: Primary | ICD-10-CM

## 2021-09-27 DIAGNOSIS — R39.198 DIFFICULTY URINATING: ICD-10-CM

## 2021-09-27 PROCEDURE — 99212 OFFICE O/P EST SF 10 MIN: CPT | Performed by: NURSE PRACTITIONER

## 2021-09-27 NOTE — PROGRESS NOTES
[facility]  [phone]  Fax: [phone]   Pre-operative History and Physical    Irene Ratliff is a 61 y.o. male who is referred by Dr. Angelique Bean for a consultation for preoperative physical examination and medical clearance for surgery. Patient is scheduled to have Cystoscopy at [address] [place] [address] on 9-. DIAGNOSIS:  Difficulty urinating. History Obtained From:  patient    HISTORY OF PRESENT ILLNESS:    The patient is a 61 y.o. male with significant past medical history of difficulty urinating who presents for pre-op.         Past Medical History:   Diagnosis Date    Arthritis     Asthma     Atelectasis     Bilateral low back pain with sciatica 1/19/2016    Depression     Drug overdose 8/28/2016    Fract[facility]     Fractured rib     Hypertension     Irregular heartbeat     Lumbar degenerative disc disease 1/19/2016    Methadone use ([facility] 10/13/2014    Other chest pain 5/2/2019    Pneumonia     Pneumothorax, left     Polyp of transverse colon     Sleep apnea     did not tolerate CPAP     Past Surgical History:   Procedure Laterality Date    CARDIAC SURGERY      cardiac cath    COLONOSCOPY N/A 8/20/2019    COLONOSCOPY POLYPECTOMY REMOVAL HOT BIOPSY performed by Sherri Pallas, MD at [facility] Left     ulna    LAMINECTOMY      2 partial laminectomy    LUMBAR FUSION      LUMBAR SPINE SURGERY      pain stimulator placed in lower back left side L4-L5 then removed     Current Outpatient Medications   Medication Sig Dispense Refill    citalopram (CELEXA) 20 MG tablet TAKE 1 TABLET BY MOUTH EVERY MORNING      traZODone (DESYREL) 100 MG tablet TAKE 1 TABLET BY MOUTH EVERY NIGHT AT BEDTIME AS NEEDED FOR SLEEP      ALPRAZolam (XANAX) 2 MG tablet TK 1 T PO TID PRN AND 1 T HS PRN FOR AGITATION      methadone (DOLOPHINE) 5 MG tablet Take 5 mg by mouth every 4 hours as needed for Pain       No current facility-administered medications for this visit. Allergies:  Demerol hcl [meperidine], Gabapentin, and Zoloft [sertraline]  History of allergic reaction to anesthesia:  No     Social History     Tobacco Use   Smoking Status Light Tobacco Smoker    Packs/day: 0.75    Years: 15.00    Pack years: 11.25    Types: Cigarettes   Smokeless Tobacco Never Used     The patient states he drinks 0 per week. Family History   Problem Relation Age of Onset    Cancer Father     Stroke Sister     Cancer Brother        REVIEW OF SYSTEMS:    CONSTITUTIONAL:  negative  EYES:  negative  HEENT:  negative  RESPIRATORY:  negative  CARDIOVASCULAR:  negative  GASTROINTESTINAL:  negative  GENITOURINARY:  negative  INTEGUMENT/BREAST:  negative  HEMATOLOGIC/LYMPHATIC:  negative  ALLERGIC/IMMUNOLOGIC:  negative  ENDOCRINE:  negative  MUSCULOSKELETAL:  negative  NEUROLOGICAL:  negative    PHYSICAL EXAM:      /72   Pulse 73   Temp 96.9 °F (36.1 °C)   Wt 223 lb 9.6 oz (101.4 kg)   SpO2 91%   BMI 30.33 kg/m²     CONSTITUTIONAL:  awake, alert, cooperative, no apparent distress, and appears stated age    Eyes:  Lids and lashes normal, pupils equal, round and reactive to light, extra ocular muscles intact, sclera clear, conjunctiva normal    Head/ENT:  Normocephalic, without obvious abnormality, atramatic, sinuses nontender on palpation, external ears without lesions, oral pharynx with moist mucus membranes, tonsils without erythema or exudates, gums normal and good dentition.     Neck:  Supple, symmetrical, trachea midline, no adenopathy, thyroid symmetric, not enlarged and no tenderness, skin normal    Heart:  Normal apical impulse, regular rate and rhythm, normal S1 and S2, no S3 or S4, and no murmur noted    Lungs:  No increased work of breathing, good air exchange, clear to auscultation bilaterally, no crackles or wheezing    Abdomen:   normal bowel sounds, slightly distended tender, no masses palpated, no hepatosplenomegally    Extremities: No clubbing, cyanosis, or edema    NEUROLOGIC:  Awake, alert, oriented to name, place and time. ASSESSMENT AND PLAN:    1. Patient is a 61 y.o. male with above specified procedure planned on 9- with Dr. Xin Brito at [facility]. 2. Stop NSAIDS/ASA medications 7-10 days prior to procedure. 3.Patient is cleared for surgery  4.  Pre-op faxed to the [facility] [place] [facility], APRN - CNP, [address]  [phone]

## 2021-12-02 ENCOUNTER — OFFICE VISIT (OUTPATIENT)
Dept: FAMILY MEDICINE CLINIC | Age: 61
End: 2021-12-02
Payer: COMMERCIAL

## 2021-12-02 VITALS
OXYGEN SATURATION: 97 % | BODY MASS INDEX: 30.26 KG/M2 | WEIGHT: 223.4 LBS | SYSTOLIC BLOOD PRESSURE: 108 MMHG | DIASTOLIC BLOOD PRESSURE: 60 MMHG | HEART RATE: 84 BPM | HEIGHT: 72 IN

## 2021-12-02 DIAGNOSIS — Z87.898 HISTORY OF CHEST PAIN: Primary | ICD-10-CM

## 2021-12-02 DIAGNOSIS — Z01.818 PREOP EXAMINATION: Primary | ICD-10-CM

## 2021-12-02 DIAGNOSIS — R39.198 DIFFICULTY URINATING: ICD-10-CM

## 2021-12-02 PROCEDURE — 99213 OFFICE O/P EST LOW 20 MIN: CPT | Performed by: REGISTERED NURSE

## 2021-12-02 RX ORDER — CLONAZEPAM 2 MG/1
TABLET ORAL
COMMUNITY
Start: 2021-11-27 | End: 2021-12-02

## 2021-12-02 RX ORDER — TAMSULOSIN HYDROCHLORIDE 0.4 MG/1
CAPSULE ORAL
COMMUNITY
Start: 2021-10-20 | End: 2022-05-20

## 2021-12-02 NOTE — PROGRESS NOTES
[facility]  [phone]  Fax: [phone]   Pre-operative History and Physical      DIAGNOSIS:  Difficulty urinating    PROCEDURE:  Cystoscopy, prostate ultrasound    History Obtained From:  patient    HISTORY OF PRESENT ILLNESS:    The patient is a 64 y.o. male with significant past medical history of arthritis, asthma, back pain, bilateral knee pain, HTN, methadone use, cardiac cath and sleep apnea . I am seeing this patient for preop consultation for Dr. Jase Torres. Past Medical History:   Diagnosis Date    Arthritis     Asthma     Atelectasis     Bilateral low back pain with sciatica 1/19/2016    Depression     Drug overdose 8/28/2016    Fractured pelvis ([address]     Fractured rib     Hypertension     Irregular heartbeat     Lumbar degenerative disc disease 1/19/2016    Methadone use 10/13/2014    Other chest pain 5/2/2019    Pneumonia     Pneumothorax, left     Polyp of transverse colon     Sleep apnea     did not tolerate CPAP     Past Surgical History:   Procedure Laterality Date    CARDIAC SURGERY      cardiac cath    COLONOSCOPY N/A 8/20/2019    COLONOSCOPY POLYPECTOMY REMOVAL HOT BIOPSY performed by Shani Ludwig MD at [facility] Left     ulna    LAMINECTOMY      2 partial laminectomy    LUMBAR FUSION      LUMBAR SPINE SURGERY      pain stimulator placed in lower back left side L4-L5 then removed     Current Outpatient Medications   Medication Sig Dispense Refill    tamsulosin (FLOMAX) 0.4 MG capsule TAKE 1 CAPSULE BY MOUTH EVERY EVENING      traZODone (DESYREL) 100 MG tablet TAKE 1 TABLET BY MOUTH EVERY NIGHT AT BEDTIME AS NEEDED FOR SLEEP      ALPRAZolam (XANAX) 2 MG tablet TK 1 T PO TID PRN AND 1 T HS PRN FOR AGITATION      methadone (DOLOPHINE) 5 MG tablet Take 5 mg by mouth every 4 hours as needed for Pain       No current facility-administered medications for this visit.        Allergies:  Demerol hcl [meperidine], Gabapentin, and Zoloft [sertraline]  History of allergic reaction to anesthesia:  No     Social History     Tobacco Use   Smoking Status Light Tobacco Smoker    Packs/day: 0.75    Years: 15.00    Pack years: 11.25    Types: Cigarettes   Smokeless Tobacco Never Used     The patient states he drinks 0 per week. Family History   Problem Relation Age of Onset   Rogelio Kumarr Cancer Father     Stroke Sister     Cancer Brother        REVIEW OF SYSTEMS:    CONSTITUTIONAL:  negative  EYES:  negative  HEENT:  negative  RESPIRATORY:  negative  CARDIOVASCULAR:  negative  GASTROINTESTINAL:  negative  GENITOURINARY: positive for difficulty urinating- chronic  INTEGUMENT/BREAST:  negative  HEMATOLOGIC/LYMPHATIC:  negative  ALLERGIC/IMMUNOLOGIC:  negative  ENDOCRINE:  negative  MUSCULOSKELETAL:  positive for chronic back pain and bilateral knee pain  NEUROLOGICAL:  negative    PHYSICAL EXAM:      /60   Pulse 84   Ht 6' (1.829 m)   Wt 223 lb 6.4 oz (101.3 kg)   SpO2 97%   BMI 30.30 kg/m²     CONSTITUTIONAL:  awake, alert, cooperative, no apparent distress, and appears stated age    Eyes:  Lids and lashes normal, pupils equal, round and reactive to light, extra ocular muscles intact, sclera clear, conjunctiva normal    Head/ENT:  Normocephalic, without obvious abnormality, atramatic, sinuses nontender on palpation, external ears without lesions, oral pharynx with moist mucus membranes, tonsils without erythema or exudates, gums normal and good dentition.     Neck:  Supple, symmetrical, trachea midline, no adenopathy, thyroid symmetric, not enlarged and no tenderness, skin normal    Heart:  Normal apical impulse, regular rate and rhythm, normal S1 and S2, no S3 or S4, and no murmur noted    Lungs:  No increased work of breathing, good air exchange, clear to auscultation bilaterally, no crackles or wheezing    Abdomen:  No scars, normal bowel sounds, soft, non-distended, non-tender, no masses palpated, no hepatosplenomegally    Extremities:  No clubbing, cyanosis, or edema    NEUROLOGIC:  Awake, alert, oriented to name, place and time. Cranial nerves II-XII are grossly intact. Motor is 5 out of 5 bilaterally. ASSESSMENT AND PLAN:    1. Patient is a 64 y.o. male with above specified procedure planned on 12/9/21 with Dr. Noe Barrera at [facility]. They will not require cardiology evaluation prior to procedure. 2. Stop ASA/NSAIDs medications 7-10 days prior to procedure. 3.Patient is cleared for surgery  4. Preop has been faxed to Dr. Lorenzo Dickey office    Marybeth Capps, RIAO - [address] [id number], [place]  [address] [place] [address]  [phone]

## 2021-12-02 NOTE — PROGRESS NOTES
His ortho specialist reccommended that he see a cardiologist prior to his knee surgery. Referral placed.

## 2021-12-03 PROBLEM — M17.11 PRIMARY OSTEOARTHRITIS OF RIGHT KNEE: Status: ACTIVE | Noted: 2021-10-26

## 2021-12-08 ENCOUNTER — HOSPITAL ENCOUNTER (OUTPATIENT)
Dept: CARDIOLOGY | Age: 61
Discharge: HOME | End: 2021-12-08
Payer: COMMERCIAL

## 2021-12-08 DIAGNOSIS — R60.0 LOWER LEG EDEMA: ICD-10-CM

## 2021-12-08 DIAGNOSIS — R07.9 EXERTIONAL CHEST PAIN: ICD-10-CM

## 2021-12-08 LAB
EKG ATRIAL RATE: 76 BPM
EKG DIAGNOSIS: NORMAL
EKG P AXIS: 33 DEGREES
EKG P-R INTERVAL: 170 MS
EKG Q-T INTERVAL: 388 MS
EKG QRS DURATION: 152 MS
EKG QTC CALCULATION (BAZETT): 436 MS
EKG R AXIS: 78 DEGREES
EKG T AXIS: 44 DEGREES
EKG VENTRICULAR RATE: 76 BPM
LV EF: 60 %
LVEF MODALITY: NORMAL

## 2021-12-08 PROCEDURE — 6360000004 HC RX CONTRAST MEDICATION: Performed by: REGISTERED NURSE

## 2021-12-08 PROCEDURE — 93351 STRESS TTE COMPLETE: CPT

## 2021-12-08 PROCEDURE — 93005 ELECTROCARDIOGRAM TRACING: CPT | Performed by: REGISTERED NURSE

## 2021-12-08 PROCEDURE — 93010 ELECTROCARDIOGRAM REPORT: CPT | Performed by: INTERNAL MEDICINE

## 2021-12-08 PROCEDURE — 93320 DOPPLER ECHO COMPLETE: CPT

## 2021-12-08 RX ADMIN — PERFLUTREN 1.65 MG: 6.52 INJECTION, SUSPENSION INTRAVENOUS at 14:32

## 2021-12-13 ENCOUNTER — TELEPHONE (OUTPATIENT)
Dept: FAMILY MEDICINE CLINIC | Age: 61
End: 2021-12-13

## 2021-12-13 NOTE — TELEPHONE ENCOUNTER
Called patient to inform him of Geraldine's response. Patient stated that he didn't understand why because \"he could die in his sleep\", I explained if there were any critical results he would have been notified immediately. Patient then stated he had black puss coming out of his left knee and it was swollen. Patient also stated his right leg was bone to bone. I asked patient if the puss was coming out currently and he stated it was 9 months ago. I advised patient that he does see Dr. Duane Ban with orthopedic's and he should call their office for advise. Patient then stated he was wetting the bed and peeing out blood. I did advise patient to call Dr. Giana Temple office. Again, I did explain to patient that [address] would respond to patient tomorrow when she receives his message.      FYI above

## 2021-12-13 NOTE — TELEPHONE ENCOUNTER
This will need to wait for Mya, It looks like orthopedic wanted the cardiology evaluation done prior to surgery. A referral was placed to [facility] cardiology. He was also only seen for this pre-op and not for these other issues he has mentioned, he may need another appointment to discuss the other problems. I am routing to Mya to address when she is back in the office.

## 2021-12-14 NOTE — TELEPHONE ENCOUNTER
Patient is stating he wants to be seen because he has an infection on his leg. Patient also wants to know what the results are of his cardiology tests. Patient stated stated he wasn't told to see a cardiology or has spoken with a cardiologist.   Please advise about tomorrow regarding switching patient from in person to [facility] [address]? Patient was very upset and repeated again about his leg and orthopedic history as well as urology problems. I did inform him to call those providers. Patient kept repeating himself and then went on regarding the phone systems and how he cannot get through in any offices. I apologized to patient and informed him I would send Royal Krause a message regarding tomorrows appointment and what she would address. Patient asked who the doctor in charge was and I informed there was not a physician in charge. I told the patient we have an  if he needed to contact or wanted to speak with someone. Patient stated someone used to sign his forms and said he was the best doctor and that's who it was. Then he said Dr. Yossi Stroud. I informed patient he was no longer with our practice and again informed patient I sent a patient to Royal Krause and we would get back with back him.

## 2021-12-15 ENCOUNTER — VIRTUAL VISIT (OUTPATIENT)
Dept: FAMILY MEDICINE CLINIC | Age: 61
End: 2021-12-15
Payer: COMMERCIAL

## 2021-12-15 DIAGNOSIS — L03.90 CELLULITIS, UNSPECIFIED CELLULITIS SITE: Primary | ICD-10-CM

## 2021-12-15 PROCEDURE — 99213 OFFICE O/P EST LOW 20 MIN: CPT | Performed by: REGISTERED NURSE

## 2021-12-15 RX ORDER — MELOXICAM 15 MG/1
TABLET ORAL
COMMUNITY
Start: 2021-12-14 | End: 2022-05-20

## 2021-12-15 RX ORDER — DOXYCYCLINE HYCLATE 100 MG
100 TABLET ORAL 2 TIMES DAILY
Qty: 20 TABLET | Refills: 0 | Status: SHIPPED | OUTPATIENT
Start: 2021-12-15 | End: 2021-12-25

## 2021-12-15 ASSESSMENT — ENCOUNTER SYMPTOMS
BACK PAIN: 1
GASTROINTESTINAL NEGATIVE: 1
SHORTNESS OF BREATH: 0
COUGH: 0
COLOR CHANGE: 1

## 2021-12-15 NOTE — PROGRESS NOTES
12/15/2021    TELEHEALTH EVALUATION -- Audio/Visual (During QAEFX-86 public health emergency)    HPI:    Lilliana Abad (:  1960) has requested an audio/video evaluation for the following concern(s):    He is here to discuss his cardiology testing and says his lower leg is red and swollen around his tattoo. He says that his knee is still swollen after 9 months. He has a call out to his ortho provider. He is also confused about his referral to cardiology. Review of Systems   Constitutional: Negative for fatigue and fever. Respiratory: Negative for cough and shortness of breath. Cardiovascular: Positive for leg swelling. Negative for chest pain and palpitations. Gastrointestinal: Negative. Genitourinary: Negative. Musculoskeletal: Positive for arthralgias, back pain ( chronic back pain) and joint swelling ( chronic left knee swelling). Skin: Positive for color change ( redness in left leg). Neurological: Negative. Prior to Visit Medications    Medication Sig Taking?  Authorizing Provider   doxycycline hyclate (VIBRA-TABS) 100 MG tablet Take 1 tablet by mouth 2 times daily for 10 days Yes MICHELLE Dover - CNP   tamsulosin (FLOMAX) 0.4 MG capsule TAKE 1 CAPSULE BY MOUTH EVERY EVENING Yes Historical Provider, MD   traZODone (DESYREL) 100 MG tablet TAKE 1 TABLET BY MOUTH EVERY NIGHT AT BEDTIME AS NEEDED FOR SLEEP Yes Historical Provider, MD   ALPRAZolam (XANAX) 2 MG tablet TK 1 T PO TID PRN AND 1 T HS PRN FOR AGITATION Yes Historical Provider, MD   methadone (DOLOPHINE) 5 MG tablet Take 5 mg by mouth every 4 hours as needed for Pain Yes Historical Provider, MD   meloxicam (MOBIC) 15 MG tablet   Historical Provider, MD       Social History     Tobacco Use    Smoking status: Light Tobacco Smoker     Packs/day: 0.75     Years: 15.00     Pack years: 11.25     Types: Cigarettes    Smokeless tobacco: Never Used   Vaping Use    Vaping Use: Never used   Substance Use Topics    Alcohol use: No    Drug use: Not Currently     Comment: none for 30 years        Allergies   Allergen Reactions    Demerol Hcl [Meperidine] Anaphylaxis    Gabapentin Shortness Of Breath    Zoloft [Sertraline] Other (See Comments)     headaches   ,   Past Medical History:   Diagnosis Date    Arthritis     Asthma     Atelectasis     Bilateral low back pain with sciatica 1/19/2016    Depression     Drug overdose 8/28/2016    Fractured pelvis ([address]     Fractured rib     Hypertension     Irregular heartbeat     Lumbar degenerative disc disease 1/19/2016    Methadone use 10/13/2014    Other chest pain 5/2/2019    Pneumonia     Pneumothorax, left     Polyp of transverse colon     Sleep apnea     did not tolerate CPAP       PHYSICAL EXAMINATION:  [ INSTRUCTIONS:  \"[x]\" Indicates a positive item  \"[]\" Indicates a negative item  -- DELETE ALL ITEMS NOT EXAMINED]    Constitutional: [x] Appears well-developed and well-nourished [x] No apparent distress      [] Abnormal-   Mental status  [x] Alert and awake  [x] Oriented to person/place/time [x]Able to follow commands      Eyes:  EOM    [x]  Normal  [] Abnormal-  Sclera  []  Normal  [] Abnormal -         Discharge []  None visible  [] Abnormal -    HENT:   [x] Normocephalic, atraumatic. [] Abnormal   [] Mouth/Throat: Mucous membranes are moist.     Pulmonary/Chest: [x] Respiratory effort normal.  [x] No visualized signs of difficulty breathing or respiratory distress        [] Abnormal-      Musculoskeletal:   [] Normal gait with no signs of ataxia         [x] Normal range of motion of neck        [] Abnormal-       Neurological:        [x] No Facial Asymmetry (Cranial nerve 7 motor function) (limited exam to video visit)          [] No gaze palsy        [] Abnormal-         Skin:        [] No significant exanthematous lesions or discoloration noted on facial skin         [x] Abnormal-erythema and edema of left lower leg visible on video. Psychiatric:       [x] Normal Affect [] No Hallucinations        [] Abnormal-     Other pertinent observable physical exam findings-knee remains edematousno erythema. This has been ongoing for the past 9 months. ASSESSMENT/PLAN:  1. Cellulitis, unspecified cellulitis site  Antibiotic prescribed for what appears to be cellulitis. Exam is limited due to this being a virtual visit. - doxycycline hyclate (VIBRA-TABS) 100 MG tablet; Take 1 tablet by mouth 2 times daily for 10 days  Dispense: 20 tablet; Refill: 0    Discussed referral to cardiology. The cardiology referral was discussed at previous visit on 12/2/2021. The referral was placed at that visit. He was instructed by another one of his providers to get a cardiology consult. He does have a history of a cardiac catheterization. After discussing this with the patient he does say that he recalls our conversation and referral information for cardiology was provided to patient again. He has had ongoing swelling in his left knee. There is no erythema. He discusses his knee issues at every visit. I do feel that this is a driving factor in his overall quality of life however his compliance with follow-up is poor. He has been referred back to his Ortho specialist multiple times but he says that he has not been able to get in touch with his provider. Ortho referral was placed proximally 6 months ago. The permission was again provided to patient and he was encouraged to follow-up for further evaluation of his left knee. No follow-ups on file. Torres Salcido is a 64 y.o. male being evaluated by a Virtual Visit (video visit) encounter to address concerns as mentioned above. A caregiver was present when appropriate. Due to this being a TeleHealth encounter (During [facility] public health emergency), evaluation of the following organ systems was limited: Vitals/Constitutional/EENT/Resp/CV/GI//MS/Neuro/Skin/Heme-Lymph-Imm.   Pursuant to the emergency declaration under the [facility], [address] [place] [address] authority and the [employer] Act, this Virtual Visit was conducted with patient's (and/or legal guardian's) consent, to reduce the patient's risk of exposure to COVID-19 and provide necessary medical care. The patient (and/or legal guardian) has also been advised to contact this office for worsening conditions or problems, and seek emergency medical treatment and/or call 911 if deemed necessary. Services were provided through a video synchronous discussion virtually to substitute for in-person clinic visit. Patient and provider were located at their individual homes. --MICHELLE Bowers - CNP on 12/15/2021 at 4:29 PM    An electronic signature was used to authenticate this note. This chart was generated using the [address] [place] [address] [address] dictation system. I created this record but it may contain dictation errors due to the limitation of the software.

## 2021-12-21 ENCOUNTER — TELEPHONE (OUTPATIENT)
Dept: FAMILY MEDICINE CLINIC | Age: 61
End: 2021-12-21

## 2021-12-21 NOTE — TELEPHONE ENCOUNTER
----- Message from Maddie Pimentel sent at 12/21/2021  2:10 PM EST -----  Subject: Message to Provider    QUESTIONS  Information for Provider? [facility] received documentation for a   lift chair that is missing diagnosis codes and please fax back. [place] [phone]  ---------------------------------------------------------------------------  --------------  Iftikhar SERNA  What is the best way for the office to contact you? OK to leave message on   voicemail  Preferred Call Back Phone Number? [phone]  ---------------------------------------------------------------------------  --------------  SCRIPT ANSWERS  Relationship to Patient?  Self

## 2022-01-03 ENCOUNTER — TELEPHONE (OUTPATIENT)
Dept: FAMILY MEDICINE CLINIC | Age: 62
End: 2022-01-03

## 2022-01-03 NOTE — TELEPHONE ENCOUNTER
-Pt requesting Advise about Covid Exposure on 12/31/21. Pt has no symptoms.  -Pt states he has Knee Surgery scheduled on 1/4/22.  -Pt wants to know if he can have Right Knee Surgery tomorrow 1/4/22 ?

## 2022-01-05 ENCOUNTER — TELEPHONE (OUTPATIENT)
Dept: FAMILY MEDICINE CLINIC | Age: 62
End: 2022-01-05

## 2022-01-05 NOTE — TELEPHONE ENCOUNTER
----- Message from Virgilio Medina sent at 1/5/2022  2:05 PM EST -----  Subject: Appointment Request    Reason for Call: Routine (Patient Request) No Script    QUESTIONS  Type of Appointment? Established Patient  Reason for appointment request? Available appointments did not meet   patient need  Additional Information for Provider? Pt needs surgery clearance Surgery on   RT knee on 1/14 Pt states he does not feel like LT is completely healed   yet - is painful and swollen Could not find appt to meet time need   ---------------------------------------------------------------------------  --------------  CALL BACK INFO  What is the best way for the office to contact you? OK to leave message on   voicemail  Preferred Call Back Phone Number? [phone]  ---------------------------------------------------------------------------  --------------  SCRIPT ANSWERS  Relationship to Patient? Self  (Is the patient requesting to see the provider for a procedure?)? No  (Is the patient requesting to see the provider urgently  today or   tomorrow. )? No  Have you been diagnosed with, awaiting test results for, or told that you   are suspected of having COVID-19 (Coronavirus)? (If patient has tested   negative or was tested as a requirement for work, school, or travel and   not based on symptoms, answer no)? No  Within the past two weeks have you developed any of the following symptoms   (answer no if symptoms have been present longer than 2 weeks or began   more than 2 weeks ago)? Fever or Chills, Cough, Shortness of breath or   difficulty breathing, Loss of taste or smell, Sore throat, Nasal   congestion, Sneezing or runny nose, Fatigue or generalized body aches   (answer no if pain is specific to a body part e.g. back pain), Diarrhea,   Headache? No  Have you had close contact with someone with COVID-19 in the last 14 days? No  (Service Expert  click yes below to proceed with [employer] As [employer]ual   Scheduling)?  Yes

## 2022-01-05 NOTE — TELEPHONE ENCOUNTER
Sinus rhythm with Premature supraventricular complexes and Right bundle branch block. This is a change from the previous recorded EKG available in his chart.

## 2022-01-05 NOTE — TELEPHONE ENCOUNTER
Patient has been scheduled 1/11/22 for pre op. He would like to know if he needs cardiac clearance.  Would like to discuss

## 2022-01-05 NOTE — TELEPHONE ENCOUNTER
JOHNNA    Patient informed. Patient states he can't get in with the cardiologist until the 19th. Patient states he feels like he may have to push off the surgery due to him not being recovered. He is unsure right now and is still trying to reach Dr. Vazquez Medina.

## 2022-01-05 NOTE — TELEPHONE ENCOUNTER
----- Message from Blanche Villafana sent at 1/5/2022  2:07 PM EST -----  Subject: Results Request    QUESTIONS  Which lab or imaging result is the patient calling about? EKG  Which provider ordered the test?   At what location was the test performed? Date the test was performed? Additional Information for Provider? Pt would like to know the results to   the EKG that was done \"a week or two ago\"  ---------------------------------------------------------------------------  --------------  CALL BACK INFO  What is the best way for the office to contact you?  OK to leave message on   voicemail  Preferred Call Back Phone Number? [phone]

## 2022-01-11 NOTE — TELEPHONE ENCOUNTER
Pt contacted and he was informed that TL is not able to clear him until his Cardiologist clears him first. Pt informed that the surgery is cancelled and will get all other issues resolved and then will call when apt for surgery is rescheduled. Pt verbalized understanding.

## 2022-01-17 NOTE — PROGRESS NOTES
[address] [facility]  [phone]      Attending Physician: No att. providers found  Reason for Consultation/Chief Complaint:   Chest pain, pre-operative evaluation    Subjective   History of Present Illness:  Juanita Carreon is a 64 y.o. male with a history of obesity, essential hypertension, hyperlipidemia, bilateral knee osteoarthritis, lumbar DDD, and tobacco use referred for further evaluation of chest pain and pre-operative cardiac risk assessment. The patient actually denies having had any recent chest pain to me. He is mostly frustrated about his multiple orthopaedic issues and is scheduled to have a right total knee arthroplasty on 2/25/22. He says that he lifts weights regularly and climbs stairs for exercise. His functional capacity is mostly limited by his knee pain. He says that he does sometimes get short of breath when climbing stairs during his work-out routine, but again denies any chest pain. He further denies any lightheadedness, dizziness, palpitations, or orthopnea. He does complain of some knee swelling. He started smoking 1-2 cigarettes per day following his son's death in February 2022. He does not drink alcohol and denies use of illicit substances. Family history is notable for CVA in his sister, liver disease in his brother, and cancer in his father. He had a TTE and exercise stress echo performed on 12/8/21. His TTE showed an LVEF of 55-60% with normal wall motion, normal LV diastolic filling pressure, normal RV size and systolic function, and mild mitral and tricuspid regurgitation. Systolic pulmonary artery pressure was estimated at 25 mmHg. During his stress echo, he exercised for 5 minutes on a Pola protocol (7 METs) and achieved 86% of his age-predicted maximal heart rate.   No ischemic ECG changes were seen and his stress echocardiogram demonstrated uniform augmentation of all myocardial segments with appropriate hyperdynamic LV systolic response to stress. He believes that he had a heart catheterization in his 35s, although I cannot find definitive evidence of this in his chart. He says that he did not require any intervention at the time. Past Medical History:   has a past medical history of Arthritis, Asthma, Atelectasis, Bilateral low back pain with sciatica, Depression, Drug overdose, Fractured pelvis ([address] Fractured rib, Hypertension, Irregular heartbeat, Lumbar degenerative disc disease, Methadone use, Other chest pain, Pneumonia, Pneumothorax, left, Polyp of transverse colon, and Sleep apnea. Surgical History:   has a past surgical history that includes laminectomy; Lumbar spine surgery; Cardiac surgery; lumbar fusion; fracture surgery (Left); and Colonoscopy (N/A, 2019). Social History:   reports that he has been smoking cigarettes. He has a 11.25 pack-year smoking history. He has never used smokeless tobacco. He reports previous drug use. He reports that he does not drink alcohol. Family History:  Sister -  of CVA at age 67  Brother - Liver failure, alcohol abuse  Father - Cancer      Home Medications:  Were reviewed and are listed in nursing record and/or below  Prior to Admission medications    Medication Sig Start Date End Date Taking?  Authorizing Provider   tamsulosin (FLOMAX) 0.4 MG capsule TAKE 1 CAPSULE BY MOUTH EVERY EVENING 10/20/21  Yes Historical Provider, MD   ALPRAZolam (XANAX) 2 MG tablet TK 1 T PO TID PRN AND 1 T HS PRN FOR AGITATION 20  Yes Historical Provider, MD   methadone (DOLOPHINE) 5 MG tablet Take 5 mg by mouth every 4 hours as needed for Pain   Yes Historical Provider, MD   meloxicam (MOBIC) 15 MG tablet  21   Historical Provider, MD   traZODone (DESYREL) 100 MG tablet TAKE 1 TABLET BY MOUTH EVERY NIGHT AT BEDTIME AS NEEDED FOR SLEEP  Patient not taking: Reported on 2022   Historical Provider, MD        CURRENT Medications:  No current facility-administered medications for this visit. Allergies:  Demerol hcl [meperidine], Gabapentin, and Zoloft [sertraline]     Review of Systems:   Review of Systems   Constitutional: Negative for chills and fever. HENT: Negative for congestion, rhinorrhea and sore throat. Eyes: Negative for photophobia, pain and visual disturbance. Respiratory: Positive for shortness of breath. Negative for cough. Cardiovascular: Positive for leg swelling. Negative for chest pain and palpitations. Gastrointestinal: Negative for abdominal pain, blood in stool, constipation, diarrhea, nausea and vomiting. Endocrine: Negative for cold intolerance and heat intolerance. Genitourinary: Negative for difficulty urinating, dysuria and hematuria. Musculoskeletal: Positive for arthralgias, back pain, joint swelling and myalgias. Skin: Negative for rash and wound. Allergic/Immunologic: Negative for environmental allergies and food allergies. Neurological: Negative for dizziness, syncope and light-headedness. Hematological: Negative for adenopathy. Does not bruise/bleed easily. Psychiatric/Behavioral: Negative for dysphoric mood. The patient is not nervous/anxious. Objective   PHYSICAL EXAM:    Vitals:    01/19/22 1044   BP: 134/66   Pulse: 77   SpO2: 94%    Weight: 231 lb (104.8 kg)       General: Adult male in no acute distress. Pleasant and interactive on exam.  HEENT: Normocephalic, atraumatic, non-icteric, hearing intact, nares normal, mucous membranes moist.  Neck: Supple, trachea midline. No adenopathy. No thyromegaly. No carotid bruits. No JVD. Heart: Regular rate and rhythm. Normal S1 and S2. No murmurs, gallops or rubs. Lungs: Normal respiratory effort. Clear to auscultation bilaterally. No wheezes, rales, or rhonchi. Abdomen: Soft, non-tender. Normoactive bowel sounds. No masses or organomegaly. Skin: No rashes, wounds, or lesions. Pulses: 2+ and symmetric. Extremities: Trace bilateral LE edema.   No clubbing or cyanosis. Musculoskeletal: Spontaneously moves all four extremities. Right knee is in brace. Left knee appears mildly swollen. Psych: Normal mood and affect. Neuro: Alert and oriented to person, place, and time. No focal deficits noted. Labs   CBC:   Lab Results   Component Value Date    WBC 8.9 08/20/2021    RBC 5.20 08/20/2021    HGB 14.4 08/20/2021    HCT 43.5 08/20/2021    MCV 83.6 08/20/2021    RDW 14.8 08/20/2021     08/20/2021     CMP:  Lab Results   Component Value Date     08/20/2021    K 4.3 08/20/2021    K 4.1 02/17/2021     08/20/2021    CO2 29 08/20/2021    BUN 14 08/20/2021    CREATININE 0.9 08/20/2021    GFRAA >60 08/20/2021    AGRATIO 1.5 08/20/2021    LABGLOM >60 08/20/2021    GLUCOSE 73 08/20/2021    PROT 6.6 08/20/2021    CALCIUM 9.2 08/20/2021    BILITOT 0.3 08/20/2021    ALKPHOS 82 08/20/2021    AST 14 08/20/2021    ALT 12 08/20/2021     PT/INR:  No results found for: PTINR  HgBA1c:  Lab Results   Component Value Date    LABA1C 5.8 08/20/2021     Lab Results   Component Value Date    CKTOTAL 145 08/28/2016    TROPONINI <0.01 03/29/2019         Cardiac Data     Last EKG 1/19/22:  Normal sinus rhythm, RBBB    ECHO:   TTE 8/18/14:   Study Conclusions  Estimated ejection fraction was in the range of 55-60%. Wall thickness normal. Regional wall motion abnormalities cannot be excluded. Ventricular septum: the contour showed diastolic flattening and systolic flattening  Aortic root: the aortic mike was dilated  Right ventricle: systolic function was normal by visual assessment. Tricuspid annular systolic velocity: 14 cm/s  Pulmonary arteries:systolic pressure was mildly increased, in the range of 32mm Hg to 37mm Hg assuming that the right atrial pressure was 15-20 mmHg. TTE 10/13/14:  Summary   Overall left ventricular function is normal.   Ejection fraction is visually estimated to be 55-60 %. Mild concentric left ventricular hypertrophy is present.    Left ventricle size is normal.   The right ventricle is enlarged. Tricuspid valve is structurally normal.   Trivial tricuspid regurgitation. Systolic pulmonary artery pressure (SPAP) is normal and estimated at 15 mmHg   (RA pressure 3 mmHg). Right atrial size is dilated. TTE 5/10/19:  Summary   Normal left ventricle systolic function with an estimated ejection fraction   of 55%. No regional wall motion abnormalities are seen. Normal left ventricular diastolic filling pressure. The right atrium is mildly dilated. Systolic pulmonary artery pressure (SPAP) is normal and estimated at 20 mmHg   (right atrial pressure 3 mmHg)    TTE 12/8/21:  Conclusions   Summary   Normal left ventricle size, wall thickness and systolic function with an   estimated ejection fraction of 55-60%. Definity contrast administered with no evidence of left ventricular mass or   thrombus noted. No regional wall motion abnormalities are seen. Normal left ventricular diastolic filling pressure. Normal RV size and systolic function. Mild mitral and tricuspid regurgitation. Systolic pulmonary artery pressure (SPAP) is normal and estimated at 25 mmHg   (right atrial pressure 3 mmHg). Stress Test:  Pharmacologic Nuclear SPECT Stress 5/10/19:  Conclusions        Summary    Normal myocardial perfusion study with normal left ventricular function,    size, and wall motion.    The estimated left ventricular function is 73%. Exercise Stress Echo 12/8/21:  Conclusions   Summary   Normal stress ECHO.     Stress Protocol: Pola      Rest HR: 73 bpm                           HR Response: Normal   Rest BP: 129/86 mmHg                      HR BP Product: [id number]   Stress Peak HR: 137 bpm                   Max Exercise: 7 METS   Stress Peak BP: 197/65 mmHg   Predicted HR: 159 bpm   % of predicted HR: 86   Test Duration: 5 min   Reason for Termination: Target heart rate       Cath: Reports undergoing cardiac catheterization in 30s, although I cannot find any definitive documentation of this in his chart. No intervention was apparently performed at the time. CTPA 10/13/14:  FINDINGS: The study is nondiagnostic for evaluation of pulmonary   embolus due to poor timing of the contrast bolus.       An endotracheal tube terminates in the distal trachea. The central   airways are patent. There is atelectasis in the middle lobe and   lingula and there is dense consolidation in both lower lobes. Septal thickening is present. There is mild emphysema. There is no   pneumothorax.       There are small bilateral pleural effusions. There is no   pericardial effusion. The heart size is mildly enlarged. Coronary   artery calcifications are present. There is no aortic aneurysm for   dissection.       An enteric tube terminates in the stomach. The visualized upper   abdomen is otherwise unremarkable. Assessment:      1. Pre-operative cardiac risk assessment - Patient was initially referred for further evaluation of chest pain, although he denies having any chest pain to me. He demonstrated that he was able to perform up to 7 METS during his recent exercise stress echocardiogram which showed no evidence of ischemia. Overall, he would be considered low risk for an adverse perioperative cardiovascular event in the setting of an intermediate risk surgical procedure. No further cardiac evaluation is indicating prior to proceeding with his planned surgery. 2. Essential hypertension - BP currently 134/66 today in clinic. 3. Obesity - BMI 31.33.  4. Coronary calcifications on CT  5. Hyperlipidemia  6. Bilateral knee osteoarthritis  7. Lumbar DDD  8. Tobacco use      Plan:     1. Overall, patient is low risk for an adverse perioperative cardiovascular event in the setting of an intermediate risk surgical procedure. No further cardiac evaluation is indicating prior to proceeding with his planned surgery.   2. Given evidence of coronary calcifications on previous CT, would recommend starting aspirin 81 mg daily and atorvastatin 40 mg daily. 3. Plan to obtain repeat lipid panel in ~3 months. 4. Discussed importance of weight loss, heart healthy, low sodium diet, and regular physical exercise. 5. Reviewed risks of tobacco use and strongly encouraged smoking cessation. 6. Follow-up with me in one year. Tristenibe's attestation: This note was scribed in the presence of Dr. Haylie Chao DO   by Toribio Garnett LPN      It is a pleasure to assist in the care of [facility]. Please call with any questions. The scribes documentation has been prepared under my direction and personally reviewed by me in its entirety. I confirm that the note above accurately reflects all work, treatment, procedures, and medical decision making performed by me. I, Dr. Haylie Chao, personally performed the services described in this documentation as scribed by Toribio Garnett LPN in my presence, and it is both accurate and complete to the best of our ability. Thank you for allowing us to participate in the care of Marie Sneed. Please call me with any questions [phone]. Haylie Chao DO  [place] [address]  [phone] [facility]  [phone] [address] [place] [address]  1/19/2022 10:57 AM      I will address the patient's cardiac risk factors and adjusted pharmacologic treatment as needed. In addition, I have reinforced the need for patient directed risk factor modification. All questions and concerns were addressed to the patient/family. Alternatives to my treatment were discussed. The note was completed using EMR. Every effort was made to ensure accuracy; however, inadvertent computerized transcription errors may be present.

## 2022-01-19 ENCOUNTER — OFFICE VISIT (OUTPATIENT)
Dept: CARDIOLOGY CLINIC | Age: 62
End: 2022-01-19
Payer: COMMERCIAL

## 2022-01-19 VITALS
WEIGHT: 231 LBS | HEIGHT: 72 IN | BODY MASS INDEX: 31.29 KG/M2 | OXYGEN SATURATION: 94 % | SYSTOLIC BLOOD PRESSURE: 134 MMHG | HEART RATE: 77 BPM | DIASTOLIC BLOOD PRESSURE: 66 MMHG

## 2022-01-19 DIAGNOSIS — Z01.818 PRE-OP EVALUATION: ICD-10-CM

## 2022-01-19 DIAGNOSIS — I10 ESSENTIAL HYPERTENSION: ICD-10-CM

## 2022-01-19 DIAGNOSIS — Z72.0 TOBACCO USE: ICD-10-CM

## 2022-01-19 DIAGNOSIS — M17.0 BILATERAL PRIMARY OSTEOARTHRITIS OF KNEE: ICD-10-CM

## 2022-01-19 DIAGNOSIS — E78.5 HYPERLIPIDEMIA, UNSPECIFIED HYPERLIPIDEMIA TYPE: Primary | ICD-10-CM

## 2022-01-19 DIAGNOSIS — I25.10 CORONARY ARTERY CALCIFICATION SEEN ON CAT SCAN: ICD-10-CM

## 2022-01-19 DIAGNOSIS — E66.09 CLASS 1 OBESITY DUE TO EXCESS CALORIES WITH SERIOUS COMORBIDITY AND BODY MASS INDEX (BMI) OF 30.0 TO 30.9 IN ADULT: ICD-10-CM

## 2022-01-19 PROCEDURE — 99204 OFFICE O/P NEW MOD 45 MIN: CPT | Performed by: INTERNAL MEDICINE

## 2022-01-19 PROCEDURE — 93000 ELECTROCARDIOGRAM COMPLETE: CPT | Performed by: INTERNAL MEDICINE

## 2022-01-19 RX ORDER — ATORVASTATIN CALCIUM 40 MG/1
40 TABLET, FILM COATED ORAL DAILY
Qty: 90 TABLET | Refills: 3 | Status: SHIPPED | OUTPATIENT
Start: 2022-01-19 | End: 2022-05-20

## 2022-01-19 RX ORDER — ASPIRIN 81 MG/1
81 TABLET ORAL DAILY
Qty: 90 TABLET | Refills: 3 | Status: SHIPPED | OUTPATIENT
Start: 2022-01-19

## 2022-01-19 ASSESSMENT — ENCOUNTER SYMPTOMS
BACK PAIN: 1
RHINORRHEA: 0
DIARRHEA: 0
COUGH: 0
SHORTNESS OF BREATH: 1
BLOOD IN STOOL: 0
VOMITING: 0
EYE PAIN: 0
NAUSEA: 0
PHOTOPHOBIA: 0
ABDOMINAL PAIN: 0
SORE THROAT: 0
CONSTIPATION: 0

## 2022-02-13 NOTE — TELEPHONE ENCOUNTER
----- Message from Silas Hall sent at 12/11/2021  1:57 PM EST -----  Subject: Results Request    QUESTIONS  Which lab or imaging result is the patient calling about? EKG  Which provider ordered the test?   At what location was the test performed? Date the test was performed? Additional Information for Provider? Please contact as soon as possible   ---------------------------------------------------------------------------  --------------  CALL BACK INFO  What is the best way for the office to contact you?  OK to leave message on   voicemail  Preferred Call Back Phone Number? [phone] Urine sent. Pt linen changed and placed in comfortable position. Pt updated on plan of care and verbally understood.

## 2022-02-23 ENCOUNTER — TELEPHONE (OUTPATIENT)
Dept: FAMILY MEDICINE CLINIC | Age: 62
End: 2022-02-23

## 2022-02-23 ENCOUNTER — HOSPITAL ENCOUNTER (EMERGENCY)
Age: 62
Discharge: HOME | End: 2022-02-23
Payer: COMMERCIAL

## 2022-02-23 VITALS
HEART RATE: 65 BPM | BODY MASS INDEX: 31.19 KG/M2 | WEIGHT: 230 LBS | TEMPERATURE: 98.1 F | DIASTOLIC BLOOD PRESSURE: 84 MMHG | RESPIRATION RATE: 18 BRPM | SYSTOLIC BLOOD PRESSURE: 135 MMHG | OXYGEN SATURATION: 95 %

## 2022-02-23 DIAGNOSIS — G89.29 CHRONIC PAIN OF LEFT KNEE: Primary | ICD-10-CM

## 2022-02-23 DIAGNOSIS — M79.89 LEG SWELLING: ICD-10-CM

## 2022-02-23 DIAGNOSIS — M25.562 CHRONIC PAIN OF LEFT KNEE: Primary | ICD-10-CM

## 2022-02-23 PROCEDURE — 97162 PT EVAL MOD COMPLEX 30 MIN: CPT

## 2022-02-23 PROCEDURE — 99282 EMERGENCY DEPT VISIT SF MDM: CPT

## 2022-02-23 PROCEDURE — 97530 THERAPEUTIC ACTIVITIES: CPT

## 2022-02-23 PROCEDURE — 97116 GAIT TRAINING THERAPY: CPT

## 2022-02-23 PROCEDURE — 99283 EMERGENCY DEPT VISIT LOW MDM: CPT

## 2022-02-23 ASSESSMENT — PAIN DESCRIPTION - ORIENTATION: ORIENTATION: LEFT

## 2022-02-23 ASSESSMENT — PAIN DESCRIPTION - PAIN TYPE: TYPE: ACUTE PAIN;CHRONIC PAIN

## 2022-02-23 ASSESSMENT — PAIN SCALES - GENERAL: PAINLEVEL_OUTOF10: 9

## 2022-02-23 ASSESSMENT — PAIN DESCRIPTION - LOCATION: LOCATION: KNEE

## 2022-02-23 NOTE — ED NOTES
Put 3 6\" ace wrap, 1 3\" ace wrap on Pt's left foot up to knee Per Pierce OBRIEN. CMS intact prior to Ace Wrap and CMS intact after Ace Wrap put on. Pt tolerated well.

## 2022-02-23 NOTE — TELEPHONE ENCOUNTER
There is an active referral for Dr. Dayanara Mendoza which I provided to him previously. Please give him this info.

## 2022-02-23 NOTE — ED NOTES
Patient is using crutches to exit the emergency room, I stopped him and offered him his discharge instructions. Pt takes them and leaves.       Tricia Dandy, RN  02/23/22 [id number]
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
## 2022-02-23 NOTE — TELEPHONE ENCOUNTER
-Pt requesting New Ortho Physician Referral.  Pt states his current Ortho doctor did not give him any physical therapy. Pt's left knee that he recently had surgery on is Very Swollen from the knee all the way down to foot. Pt states he has been putting ice on it since surgery, 1 week & swelling has never went down.   -Pt states he is going to ED today 2/23/22 for the pain & swelling.  -Please Call when Referral is ready.

## 2022-02-24 NOTE — ED PROVIDER NOTES
[address] [place] [address]  ED  EMERGENCY DEPARTMENT ENCOUNTER      This patient was not seen and evaluated by the attending physician. Pt Name: Regla Du  MRN: [id number]  Nicollegfleslie 1960  Date of evaluation: 2/23/2022  Provider: MICHELLE Estrella  PCP: MICHELLE Barnes CNP      History provided by the patient. CHIEFCOMPLAINT:     Chief Complaint   Patient presents with    Knee Pain     states L knee/ankle swelling and pain since surg about a year ago. HISTORY OF PRESENT ILLNESS:      Regla Du is a 64 y.o. male who presents to [address] [place] [address]  ED with complaints of left leg pain and swelling has been going on for a year. Patient states that he had knee surgery about a year ago, states that he had persistent swelling for about a year, just saw his orthopedic doctor couple weeks ago, patient states that he \"does not do anything for him\". He states that he \"wants a new orthopedic doctor\". Patient cannot elicit any new or acute complaints today he does have a lot of complaints about previous surgeries and admissions to the hospital but only complaint today is his left leg pain and swelling its been going on for over a year. LOCATION:left knee/leg  QUALITY:ache  SEVERITY:10  DURATION:chronic  MODIFYING FACTORS:none noted    Nursing Notes were reviewed     REVIEW OF SYSTEMS:     Review of Systems  All systems, a total of 10, are reviewed and negative except for those that were just noted in history present illness.         PAST MEDICAL HISTORY:     Past Medical History:   Diagnosis Date    Arthritis     Asthma     Atelectasis     Bilateral low back pain with sciatica 1/19/2016    Depression     Drug overdose 8/28/2016    Fractured pelvis ([address]     Fractured rib     Hypertension     Irregular heartbeat     Lumbar degenerative disc disease 1/19/2016    Methadone use 10/13/2014    Other chest pain 5/2/2019    Pneumonia     Pneumothorax, left     Polyp of transverse colon     Sleep apnea     did not tolerate CPAP         SURGICAL HISTORY:      Past Surgical History:   Procedure Laterality Date    CARDIAC SURGERY      cardiac cath    COLONOSCOPY N/A 8/20/2019    COLONOSCOPY POLYPECTOMY REMOVAL HOT BIOPSY performed by Maru Wilson MD at [facility] Left     ulna    LAMINECTOMY      2 partial laminectomy    LUMBAR FUSION      LUMBAR SPINE SURGERY      pain stimulator placed in lower back left side L4-L5 then removed         CURRENT MEDICATIONS:       Discharge Medication List as of 2/23/2022  2:08 PM      CONTINUE these medications which have NOT CHANGED    Details   aspirin EC 81 MG EC tablet Take 1 tablet by mouth daily, Disp-90 tablet, R-3Normal      atorvastatin (LIPITOR) 40 MG tablet Take 1 tablet by mouth daily, Disp-90 tablet, R-3Normal      meloxicam (MOBIC) 15 MG tablet Historical Med      tamsulosin (FLOMAX) 0.4 MG capsule TAKE 1 CAPSULE BY MOUTH EVERY EVENINGHistorical Med      traZODone (DESYREL) 100 MG tablet TAKE 1 TABLET BY MOUTH EVERY NIGHT AT BEDTIME AS NEEDED FOR SLEEPHistorical Med      ALPRAZolam (XANAX) 2 MG tablet TK 1 T PO TID PRN AND 1 T HS PRN FOR AGITATIONHistorical Med      methadone (DOLOPHINE) 5 MG tablet Take 5 mg by mouth every 4 hours as needed for Pain               ALLERGIES:    Demerol hcl [meperidine], Gabapentin, and Zoloft [sertraline]    FAMILY HISTORY:       Family History   Problem Relation Age of Onset    Cancer Father     Stroke Sister     Cancer Brother           SOCIAL HISTORY:     Social History     Socioeconomic History    Marital status:      Spouse name: None    Number of children: 3    Years of education: None    Highest education level: None   Occupational History    Occupation: [employer]    Occupation: Maintenance   Tobacco Use    Smoking status: Light Tobacco Smoker     Packs/day: 0.75     Years: 15.00     Pack years: 11.25 Types: Cigarettes    Smokeless tobacco: Never Used   Vaping Use    Vaping Use: Never used   Substance and Sexual Activity    Alcohol use: No    Drug use: Not Currently     Comment: none for 30 years    Sexual activity: Not Currently   Other Topics Concern    None   Social History Narrative    ** Merged History Encounter **            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    3 children-2 boys and girl    Son passed away 3 years ago Gallito Chan on 2/27/)    2 brothers passed away    1 sister passed away    -custody of daughter 13years old          Social Determinants of Health     Financial Resource Strain: Medium Risk    Difficulty of Paying Living Expenses: Somewhat hard   Food Insecurity: No Food Insecurity    Worried About Running Out of Food in the Last Year: Never true    Be of Food in the Last Year: Never true   Transportation Needs: No Transportation Needs    Lack of Transportation (Medical): No    Lack of Transportation (Non-Medical):  No   Physical Activity: Inactive    Days of Exercise per Week: 0 days    Minutes of Exercise per Session: 0 min   Stress: Stress Concern Present    Feeling of Stress : Very much   Social Connections: Socially Isolated    Frequency of Communication with Friends and Family: More than three times a week    Frequency of Social Gatherings with Friends and Family: More than three times a week    Attends Islam Services: Never    Active Member of Clubs or Organizations: No    Attends Club or Organization Meetings: Never    Marital Status:    Intimate Partner Violence:     Fear of Current or Ex-Partner: Not on file    Emotionally Abused: Not on file    Physically Abused: Not on file    Sexually Abused: Not on file   Housing Stability:     Unable to Pay for Housing in the Last Year: Not on file    Number of [place] in the Last Year: Not on file    Unstable Housing in the Last Year: Not on file       SCREENINGS: PHYSICAL EXAM:       ED Triage Vitals   BP Temp Temp Source Pulse Resp SpO2 Height Weight   02/23/22 1207 02/23/22 1207 02/23/22 1207 02/23/22 1207 02/23/22 1207 02/23/22 1207 -- 02/23/22 1208   135/84 98.1 °F (36.7 °C) Oral 65 18 95 %  230 lb (104.3 kg)       Physical Exam    CONSTITUTIONAL: Awake and alert. Cooperative. Well-developed. Well-nourished. Vitals:    02/23/22 1207 02/23/22 1208   BP: 135/84    Pulse: 65    Resp: 18    Temp: 98.1 °F (36.7 °C)    TempSrc: Oral    SpO2: 95%    Weight:  230 lb (104.3 kg)     HENT: Normocephalic. Atraumatic. External ears normal, without discharge. TMs clear bilaterally. Nonasal discharge. Oropharynx clear, no erythema. Mucous membranes moist.  EYES: Conjunctiva non-injected, nolid abnormalities noted. No scleral icterus. PERRL. EOM's grossly intact. Anterior chambers clear. NECK: Supple. Normal ROM. No meningismus. No thyroid tenderness or swelling noted. CARDIOVASCULAR: RRR. No Murmer. No carotid bruits. PULMONARY/CHEST WALL: Effort normal. No tachypnea. Lungs clear to ausculation. ABDOMEN: Normal BS. Soft. Nondistended. No tenderness to palpation. No guarding. No hernias noted. No splenomegaly. Back: Spine is midline. No ecchymosis. No crepituson palpation. No obvious subluxation of vertebral column. No saddle anesthesia or evidence of cauda equina. /ANORECTAL: Not assessed  MUSKULOSKELETAL: limited ROM of left knee secondary to pain. No acute deformities. 1+ edema to LLE. SKIN: Warm and dry. NEUROLOGICAL:  GCS 15. CN II-XII grossly intact. Strength is 5/5 in all extremities and sensation is intact. PSYCHIATRIC: Normal affect, normal insight and judgement. Alert and oriented x 3. DIAGNOSTIC RESULTS:     LABS:    No results found for this visit on 02/23/22. RADIOLOGY:  All x-ray studies are viewed/reviewed by me. Formal interpretations per the radiologist are as follows:       No orders to display           EKG:  See EKG interpretation by an attending physician. PROCEDURES:   N/A    CRITICAL CARE TIME:   N/A    CONSULTS:  None      EMERGENCY DEPARTMENT COURSE andDIFFERENTIAL DIAGNOSIS/MDM:   Vitals:    Vitals:    02/23/22 1207 02/23/22 1208   BP: 135/84    Pulse: 65    Resp: 18    Temp: 98.1 °F (36.7 °C)    TempSrc: Oral    SpO2: 95%    Weight:  230 lb (104.3 kg)       Patient wasgiven the following medications:  Medications - No data to display      Patient was evaluated independently by myself with the attending physician available for consultation. Patient presented to the emerged from today with complaints of chronic left knee pain and swelling. Patient had a lot of complaints about his previous surgeries and hospital stays, had difficulty ascertaining what his acute complaint was today, patient was upset that he never received physical therapy from his surgery a year ago, he had no new injuries or complaints, I did offer for him to see physical therapy which she agreed with, physical therapy came and saw the patient and gave him some information on exercises to do as an outpatient, I recommended the patient follow-up with his orthopedic doctor he just saw him 2 weeks ago and all of these things were going on at that time but he states that his orthopedic surgeon \"would not do anything for him\". He requested a referral to a second orthopedic doctor, I did give him a referral, I saw no indications of any acute emergent medical condition he was discharged home in good condition. Patient laboratory studies, radiographic imaging, and assessment were all discussed with the patient and/orpatient family. There was shared decision-making between myself as well as the patient and/or their surrogate and we are all in agreement with discharge home. There was an opportunity for questions and all questions were answered tothe best of my ability and to the satisfaction of the patient and/or patient family. FINAL IMPRESSION:      1. Chronic pain of left knee    2.  Leg swelling          DISPOSITION/PLAN:   DISPOSITION Decision To Discharge      PATIENT REFERRED TO:  DO Noel Mary  [facility] Joby Albright  [phone]    Call   For follow up      DISCHARGE MEDICATIONS:  Discharge Medication List as of 2/23/2022  2:08 PM                     (Please note thatportions of this note were completed with a voice recognition program.  Efforts were made to edit the dictations, but occasionally words are mis-transcribed.)    MICHELLE Burns - MICAH-C (electronicallysigned)        MICHELLE Burns CNP  02/24/22 9075

## 2022-02-25 ENCOUNTER — CARE COORDINATION (OUTPATIENT)
Dept: CARE COORDINATION | Age: 62
End: 2022-02-25

## 2022-02-25 NOTE — CARE COORDINATION
ACM called but patient did not answer. ACM left message for patient to call back and left call back number. Will follow up at a later date.

## 2022-03-30 ENCOUNTER — CARE COORDINATION (OUTPATIENT)
Dept: CARE COORDINATION | Age: 62
End: 2022-03-30

## 2022-03-30 NOTE — CARE COORDINATION
Second attempt at outreach for CC to patient. Left message for patient to call ACM with contact information. This is the second attempt with no success, will screen out due to unable to contact and remove from care team.  No further follow up at this time.

## 2022-04-18 ENCOUNTER — NURSE TRIAGE (OUTPATIENT)
Dept: OTHER | Facility: CLINIC | Age: 62
End: 2022-04-18

## 2022-04-18 NOTE — TELEPHONE ENCOUNTER
[facility] [employer] [facility]. Subjective: Caller states \"Workmans comp denied my back being related to my knees\"     Current Symptoms: Surgery on L5. Both knees are bone to bone. Swelling in both knees. L knee is painful. Unable to bend his L knee. Unable to cross his legs. Knee replacement 10 months ago or more. L knee is more painful than it was before his surgery. Onset: quite some time  ago;     Associated Symptoms: reduced activity    Pain Severity: 10/10; sharp; constant    Temperature: denies fever     What has been tried:     LMP: NA Pregnant: NA    Recommended disposition: Go to ED/UCC Now (Or to Office with PCP Approval)    Care advice provided, patient verbalizes understanding; denies any other questions or concerns; instructed to call back for any new or worsening symptoms. Writer provided warm transfer to [place] at [place] [address] for second level triage     Attention Provider: Thank you for allowing me to participate in the care of your patient. The patient was connected to triage in response to information provided to the ECC/PSC. Please do not respond through this encounter as the response is not directed to a shared pool.     Reason for Disposition   Patient sounds very sick or weak to the triager    Protocols used: [facility]

## 2022-04-19 NOTE — TELEPHONE ENCOUNTER
Left message on patients machine that he does need to call ortho and follow up with them since he see's them specifically for that issue.

## 2022-04-19 NOTE — TELEPHONE ENCOUNTER
Patient returned call,  When I spoke with patient he asked if I was [facility] and if I had call him. I stated that I was not [facility], there was not anyone here by that name and I was returning his call. That is why I had left the message. Patient asked if I had left a message about quality insurance. I informed him I was with Mya's office and I once again let him know I was calling in regards to his message regarding his knees and that he needs to follow up with them. Patient said he had an appt with Mya and I informed him that he did not and asked why he needed to be seen. He stated again due to his knee surgery complications. He also stated that he needed additional surgery. Patient stated that he was told he need disability forms because he would not be able to move or use his legs properly due to having the wrong size knee put in. I did inform patient that without a surgery scheduled we could not do a pre-op. We also cannot fill out forms for him if another provider is the one telling him that he needs the forms based on that providers care, that provider needs to fill them out. Patient asked about reviewing blood work and cardiac tests, I informed patient this was done previously and already discussed. I also informed patient he see's a cardiologist so he would need to speak to cardiology regarding cardiology related questions. I informed patient he was under the care of our care coordinator and asked if he was aware because he hadn't responded or answered their calls and he stated no one had called or contacted him. I apologized and did inform patient again to call his specialist and follow up with us as needed.

## 2022-05-20 ENCOUNTER — OFFICE VISIT (OUTPATIENT)
Dept: FAMILY MEDICINE CLINIC | Age: 62
End: 2022-05-20
Payer: COMMERCIAL

## 2022-05-20 VITALS
HEART RATE: 78 BPM | HEIGHT: 72 IN | OXYGEN SATURATION: 96 % | BODY MASS INDEX: 31.8 KG/M2 | SYSTOLIC BLOOD PRESSURE: 130 MMHG | DIASTOLIC BLOOD PRESSURE: 70 MMHG | WEIGHT: 234.8 LBS

## 2022-05-20 DIAGNOSIS — Z01.818 PREOP EXAMINATION: Primary | ICD-10-CM

## 2022-05-20 DIAGNOSIS — R32 URINARY INCONTINENCE, UNSPECIFIED TYPE: ICD-10-CM

## 2022-05-20 LAB
ANION GAP SERPL CALCULATED.3IONS-SCNC: 8 MMOL/L (ref 3–16)
BASOPHILS ABSOLUTE: 0 K/UL (ref 0–0.2)
BASOPHILS RELATIVE PERCENT: 0.3 %
BILIRUBIN URINE: NEGATIVE
BLOOD, URINE: NEGATIVE
BUN BLDV-MCNC: 18 MG/DL (ref 7–20)
CALCIUM SERPL-MCNC: 9.7 MG/DL (ref 8.3–10.6)
CHLORIDE BLD-SCNC: 103 MMOL/L (ref 99–110)
CLARITY: CLEAR
CO2: 29 MMOL/L (ref 21–32)
COLOR: YELLOW
CREAT SERPL-MCNC: 0.8 MG/DL (ref 0.8–1.3)
EOSINOPHILS ABSOLUTE: 0.1 K/UL (ref 0–0.6)
EOSINOPHILS RELATIVE PERCENT: 1.4 %
GFR AFRICAN AMERICAN: >60
GFR NON-AFRICAN AMERICAN: >60
GLUCOSE BLD-MCNC: 86 MG/DL (ref 70–99)
GLUCOSE URINE: NEGATIVE MG/DL
HCT VFR BLD CALC: 42.7 % (ref 40.5–52.5)
HEMOGLOBIN: 14.2 G/DL (ref 13.5–17.5)
KETONES, URINE: NEGATIVE MG/DL
LEUKOCYTE ESTERASE, URINE: NEGATIVE
LYMPHOCYTES ABSOLUTE: 2.1 K/UL (ref 1–5.1)
LYMPHOCYTES RELATIVE PERCENT: 30.7 %
MCH RBC QN AUTO: 28.4 PG (ref 26–34)
MCHC RBC AUTO-ENTMCNC: 33.3 G/DL (ref 31–36)
MCV RBC AUTO: 85.1 FL (ref 80–100)
MICROSCOPIC EXAMINATION: NORMAL
MONOCYTES ABSOLUTE: 0.3 K/UL (ref 0–1.3)
MONOCYTES RELATIVE PERCENT: 5.2 %
NEUTROPHILS ABSOLUTE: 4.2 K/UL (ref 1.7–7.7)
NEUTROPHILS RELATIVE PERCENT: 62.4 %
NITRITE, URINE: NEGATIVE
PDW BLD-RTO: 14.4 % (ref 12.4–15.4)
PH UA: 6 (ref 5–8)
PLATELET # BLD: 152 K/UL (ref 135–450)
PMV BLD AUTO: 9.1 FL (ref 5–10.5)
POTASSIUM SERPL-SCNC: 4.2 MMOL/L (ref 3.5–5.1)
PROTEIN UA: NEGATIVE MG/DL
RBC # BLD: 5.02 M/UL (ref 4.2–5.9)
SODIUM BLD-SCNC: 140 MMOL/L (ref 136–145)
SPECIFIC GRAVITY UA: 1.02 (ref 1–1.03)
URINE TYPE: NORMAL
UROBILINOGEN, URINE: 0.2 E.U./DL
WBC # BLD: 6.7 K/UL (ref 4–11)

## 2022-05-20 PROCEDURE — 99213 OFFICE O/P EST LOW 20 MIN: CPT | Performed by: REGISTERED NURSE

## 2022-05-20 PROCEDURE — 36415 COLL VENOUS BLD VENIPUNCTURE: CPT | Performed by: REGISTERED NURSE

## 2022-05-20 RX ORDER — CLONAZEPAM 2 MG/1
TABLET ORAL
COMMUNITY
Start: 2022-04-19 | End: 2022-06-02

## 2022-05-20 NOTE — PROGRESS NOTES
[facility]  [phone]  Fax: [phone]   Pre-operative History and Physical      DIAGNOSIS:  Urinary incontinence, urinary frequency    PROCEDURE:  Cystoscopy, Urolift    History Obtained From:  patient    HISTORY OF PRESENT ILLNESS:    The patient is a 64 y.o. male with significant past medical history of HTN, Avsacular necrosis, Anxiety, Chronic pain disorder, osteoarthritis, depression, asthma, chronic back pain with history of laminectomy. I am seeing this patient for preop consultation for Dr. Ameya Jarvis. Past Medical History:   Diagnosis Date    Arthritis     Asthma     Atelectasis     Bilateral low back pain with sciatica 1/19/2016    Depression     Drug overdose 8/28/2016    Fractured pelvis ([address]     Fractured rib     Hypertension     Irregular heartbeat     Lumbar degenerative disc disease 1/19/2016    Methadone use 10/13/2014    Other chest pain 5/2/2019    Pneumonia     Pneumothorax, left     Polyp of transverse colon     Sleep apnea     did not tolerate CPAP     Past Surgical History:   Procedure Laterality Date    CARDIAC SURGERY      cardiac cath    COLONOSCOPY N/A 8/20/2019    COLONOSCOPY POLYPECTOMY REMOVAL HOT BIOPSY performed by Nilay Kaye MD at [facility] Left     ulna    LAMINECTOMY      2 partial laminectomy    LUMBAR FUSION      LUMBAR SPINE SURGERY      pain stimulator placed in lower back left side L4-L5 then removed     Current Outpatient Medications   Medication Sig Dispense Refill    clonazePAM (KLONOPIN) 2 MG tablet TAKE 1 TABLET BY MOUTH TWICE DAILY      aspirin EC 81 MG EC tablet Take 1 tablet by mouth daily 90 tablet 3    methadone (DOLOPHINE) 5 MG tablet Take 5 mg by mouth every 4 hours as needed for Pain.  Pt states 1 mg      ALPRAZolam (XANAX) 2 MG tablet TK 1 T PO TID PRN AND 1 T HS PRN FOR AGITATION (Patient not taking: Reported on 5/20/2022)       No current facility-administered medications for this visit. Allergies:  Demerol hcl [meperidine], Gabapentin, and Zoloft [sertraline]  History of allergic reaction to anesthesia:  No     Social History     Tobacco Use   Smoking Status Light Tobacco Smoker    Packs/day: 0.75    Years: 15.00    Pack years: 11.25    Types: Cigarettes   Smokeless Tobacco Never Used     The patient states he drinks 0 per week. Family History   Problem Relation Age of Onset   Loren Favorite Cancer Father     Stroke Sister     Cancer Brother        REVIEW OF SYSTEMS:    CONSTITUTIONAL:  negative for  fevers, chills, fatigue and malaise  EYES:  negative  HEENT:  negative  RESPIRATORY:  negative for  dry cough, dyspnea, wheezing and chest pain  CARDIOVASCULAR:  negative for  chest pain, dyspnea, palpitations  GASTROINTESTINAL:  negative  GENITOURINARY:  positive for frequency and urinary incontinence  INTEGUMENT/BREAST:  negative  HEMATOLOGIC/LYMPHATIC:  negative  ALLERGIC/IMMUNOLOGIC:  negative  ENDOCRINE:  negative  MUSCULOSKELETAL:  positive for  Arthralgias- chronic knee and back pain  NEUROLOGICAL:  negative    PHYSICAL EXAM:      /70   Pulse 78   Ht 6' (1.829 m)   Wt 234 lb 12.8 oz (106.5 kg)   SpO2 96%   BMI 31.84 kg/m²     CONSTITUTIONAL:  awake, alert, cooperative, no apparent distress, and appears stated age    Eyes:  Lids and lashes normal, pupils equal, round and reactive to light, extra ocular muscles intact, sclera clear, conjunctiva normal    Head/ENT:  Normocephalic, without obvious abnormality, atramatic, sinuses nontender on palpation, external ears without lesions, oral pharynx with moist mucus membranes, tonsils without erythema or exudates, gums normal and dentition intact.     Neck:  Supple, symmetrical, trachea midline, no adenopathy, thyroid symmetric, not enlarged and no tenderness, skin normal    Heart:  Normal apical impulse, regular rate and rhythm, normal S1 and S2, no S3 or S4, and no murmur noted    Lungs:  No increased work of breathing, good air exchange, clear to auscultation bilaterally, no crackles or wheezing    Abdomen:  normal bowel sounds, soft, non-distended, non-tender, no masses palpated, no hepatosplenomegally    Extremities:  No clubbing, cyanosis, mild dependent edema left ankle    NEUROLOGIC:  Awake, alert, oriented to name, place and time. Cranial nerves II-XII are grossly intact. Motor is 5 out of 5 bilaterally. DATA:  EKG:  Date:  1/19/22 completed at cardiology visit  I have reviewed EKG with the following interpretation:  Impression:  Sinus rhythm- RBBB      ASSESSMENT AND PLAN:    1. Patient is a 64 y.o. male with above specified procedure planned on 6/7/22 with Dr. Emmett Hernandez at [facility]. He will need clearance from his cardiologist prior to procedure. 2. Stop ASA/NSAIDs medications 7-10 days prior to procedure. 3.Patient is cleared for surgery once Cardiology confirms clearance.   4.Preop has been faxed to Dr. Cassie Longo office    Violette Obando, LSEM - [address]  [phone]

## 2022-05-20 NOTE — PATIENT INSTRUCTIONS
Call your cardiologist to confirm clearance for your procedure. Dr. Radha Braun 81  [phone]    Take daily Metamucil.

## 2022-05-22 LAB — URINE CULTURE, ROUTINE: NORMAL

## 2022-05-23 ENCOUNTER — TELEPHONE (OUTPATIENT)
Dept: CARDIOLOGY CLINIC | Age: 62
End: 2022-05-23

## 2022-05-23 NOTE — TELEPHONE ENCOUNTER
Mariano from [facility] called with questions for cardiac clearance for patient for his Cysprolift on 6/7.    [phone]

## 2022-05-23 NOTE — TELEPHONE ENCOUNTER
CARDIAC CLEARANCE REQUEST    What type of procedure are you having: Urolift    Are you taking any blood thinners: Aspirin    Type on anesthesia: Unknown    When is your procedure scheduled for: 06/07/2022    What physician is performing your procedure: Unknown    Phone Number: [phone]    Fax number to send the letter: [phone]    [facility] some information needed was unknown @ time of call. Inge Kay Sherren Bonito CNP was OOT. Will wait for more information.

## 2022-05-24 NOTE — TELEPHONE ENCOUNTER
Patient is acceptable cardiac risk for proceeding with the planned surgical procedure. It is recommended that he remain on aspirin 81 mg daily and his statin throughout the perioperative period. Thanks.

## 2022-05-24 NOTE — TELEPHONE ENCOUNTER
[facility] contacted BRITNI Hemphill with the rest of the information needed. CARDIAC CLEARANCE REQUEST     What type of procedure are you having: Urolift     Are you taking any blood thinners: Aspirin     Type on anesthesia: General     When is your procedure scheduled for: 06/07/2022     What physician is performing your procedure: Dr. Tori Sahu MD (Urologist)     Phone Number: [phone]     Fax number to send the letter: [phone] Please address fax to [address] [place].

## 2022-05-24 NOTE — TELEPHONE ENCOUNTER
Spoke with cardiology office. They just needed to clarify who was performing surgery and what anesthesia was being used. Patient was unsure and could not provide information for office. Cardiology will finish letter and fax to our office. I will fax to urology.

## 2022-06-07 ENCOUNTER — TELEPHONE (OUTPATIENT)
Dept: FAMILY MEDICINE CLINIC | Age: 62
End: 2022-06-07

## 2022-06-07 NOTE — TELEPHONE ENCOUNTER
----- Message from Nani Burton sent at 6/7/2022  8:52 AM EDT -----  Subject: Message to Provider    QUESTIONS  Information for Provider? patient is calling regarding his pre-op appt   that was scheduled and completed on 5/20. pt stated his insurance denied   coverage for the appt due to missing information from Scottie Thapa or   something was coded incorrectly. it is not under workers comp it should be   covered through [facility]. please advise   ---------------------------------------------------------------------------  --------------  CALL BACK INFO  What is the best way for the office to contact you? OK to leave message on   voicemail  Preferred Call Back Phone Number? [phone]  ---------------------------------------------------------------------------  --------------  SCRIPT ANSWERS  Relationship to Patient?  Self

## 2022-06-07 NOTE — TELEPHONE ENCOUNTER
It is coded as a preop and the diagnosis is preop. Please find out more from insurance company. Weeks 32 to 34 of Your Pregnancy: Care Instructions  Your Care Instructions    During the last few weeks of your pregnancy, you may have more aches and pains. It's important to rest when you can. Your growing baby is putting more pressure on your bladder. So you may need to urinate more often. Hemorrhoids are also common. These are painful, itchy veins in the rectal area. In the 36th week, most women have a test for group B streptococcus (GBS). GBS is a common bacteria that can live in the vagina and rectum. It can make your baby sick after birth. If you test positive, you will get antibiotics during labor. These will keep your baby from getting the bacteria. You may want to talk with your doctor about banking your baby's umbilical cord blood. This is the blood left in the cord after birth. If you want to save this blood, you must arrange it ahead of time. You can't decide at the last minute. If you haven't already had the Tdap shot during this pregnancy, talk to your doctor about getting it. It will help protect your  against pertussis infection. Follow-up care is a key part of your treatment and safety. Be sure to make and go to all appointments, and call your doctor if you are having problems. It's also a good idea to know your test results and keep a list of the medicines you take. How can you care for yourself at home? Ease hemorrhoids  · Get more liquids, fruits, vegetables, and fiber in your diet. This will help keep your stools soft. · Avoid sitting for too long. Lie on your left side several times a day. · Clean yourself with soft, moist toilet paper. Or you can use witch hazel pads or personal hygiene pads. · If you are uncomfortable, try ice packs. Or you can sit in a warm sitz bath. Do these for 20 minutes at a time, as needed. · Use hydrocortisone cream for pain and itching. Two examples are Anusol and Preparation H Hydrocortisone.   · Ask your doctor about taking an over-the-counter stool softener. Consider breastfeeding  · Experts recommend that women breastfeed for 1 year or longer. Breast milk is the perfect food for babies. · Breast milk is easier for babies to digest than formula. And it is always available, just the right temperature, and free. · Breast milk may help protect your child from some health problems.  babies are less likely than formula-fed babies to:  ? Get ear infections, colds, diarrhea, and pneumonia. ? Be obese or get diabetes later in life. · Women who breastfeed have less bleeding after the birth. Their uteruses also shrink back faster. · Some women who breastfeed lose weight faster. Making milk burns calories. · Breastfeeding can lower your risk of breast cancer, ovarian cancer, and osteoporosis. Decide about circumcision for boys  · As you make this decision, it may help to think about your personal, Gnosticist, and family traditions. You get to decide if you will keep your son's penis natural or if he will be circumcised. · If you decide that you would like to have your baby circumcised, talk with your doctor. You can share your concerns about pain. And you can discuss your preferences for anesthesia. Where can you learn more? Go to http://julieta-jayson.info/. Enter T030 in the search box to learn more about \"Weeks 32 to 34 of Your Pregnancy: Care Instructions. \"  Current as of: September 5, 2018  Content Version: 11.9  © 2816-4370 [employer]. Care instructions adapted under license by [employer] (which disclaims liability or warranty for this information). If you have questions about a medical condition or this instruction, always ask your healthcare professional. [place] [address] any warranty or liability for your use of this information.

## 2022-06-07 NOTE — TELEPHONE ENCOUNTER
Please refer him to [facility] and if he is having suicidal ideation instruct him to go to the ED.

## 2022-06-07 NOTE — TELEPHONE ENCOUNTER
Spoke with patient. Patient stated it wasn't denied or had anything to do with workmen's comp there needed to be additional codes added on for his surgery to be covered. Patient stated that there are ties that look like electrical ties that he needs and those are the codes that need to be added on. I did attempt to explain to patient that Melonie Eisenmenger is not the one performing the procedure so she would not be adding codes on regarding items needed for surgery. I did explain I am not the medical professional so I would send the message regardless. Patient stated his next step would be to \"blow his brains out\" he gets told one thing from every person and sent to everyone and he just would rather give up. I asked patient if he was going to harm himself, and if he was ok. Patient stated he was upset and he was just saying that and he thanked me for calling back.

## 2022-06-07 NOTE — TELEPHONE ENCOUNTER
Called patient back. Patient asked what I was talking about and I reminded him of our previous conversation. He stated that he had just gotten off the phone with someone else he has talked to 12 people today now. I listened to the patient. At the end of the conversation he stated the last person he talk to said he should get an approval within a couple of days.

## 2022-06-09 NOTE — PROGRESS NOTES
Graciela Pinta    Age 64 y.o.    male    1960    MRN [id number]    6/28/2022  Arrival Time_____________  OR Time____________[address]     Procedure(s):  CYSTOSCOPY, UROLIFT                      General   Surgeon(s):  Nery Issa, MD      DAY ADMIT ___  SDS/OP ___  Camryn Paddy IN BED ___        Phone [phone] (home)     PCP _____________________ Phone_________________ Epic ( ) Epic CE ( ) Appt ________    ADDITIONAL INFO __________________________________ Cardio/Consult _____________    NOTES _____________________________________________________________________    ____________________________________________________________________________    PAT APPT DATE:________ TIME: ________  FAXED QAD: _______  (__) H&P w/ Hospitalist  __________________________________________________________________________  Preop Nurse phone screen complete: _____________  (__) CBC     (__) W/ DIFF ___________     (__) Hgb A1C    ___________  (__) CHEST X RAY   __________  (__) LIPID PROFILE  ___________  (__) EKG   __________  (__) PT/PTT   ___________  (__) PFT's   __________  (__) BMP   ___________  (__) CAROTIDS  __________  (__) CMP   ___________  (__) VEIN MAPPING  __________  (__) U/A   ___________  (__) HISTORY & PHYSICAL __________  (__) URINE C & S  ___________  (__) CARDIAC CLEARANCE __________  (__) U/A W/ FLEX  ___________  (__) PULM.  CLEARANCE __________  (__) SERUM PREGNANCY ___________  (__) Check Epic DOS orders __________  (__) TYPE & SCREEN __________repeat ( ) (__)  __________________ __________  (__) ALBUMIN Alvester Jan ___________  (__)  __________________ __________  (__) TRANSFERRIN  ___________  (__)  __________________ __________  (__) LIVER PROFILE  ___________  (__)  __________________ __________  (__) MRSA NASAL SWAB ___________  (__) URINE PREG DOS __________  (__) SED RATE  ___________  (__) BLOOD SUGAR DOS __________  (__) C-REACTIVE PROTEIN ___________    (__) VITAMIN D HYDROXY ___________  (__) BLOOD THINNERS __________    (__) ACE/ ARBS: _____________________     (__) BETABLOCKERS __________________

## 2022-06-24 ENCOUNTER — HOSPITAL ENCOUNTER (OUTPATIENT)
Dept: PREADMISSION TESTING | Age: 62
Discharge: HOME | End: 2022-06-24

## 2022-06-24 VITALS
WEIGHT: 230 LBS | RESPIRATION RATE: 20 BRPM | HEART RATE: 72 BPM | HEIGHT: 72 IN | BODY MASS INDEX: 31.15 KG/M2 | TEMPERATURE: 98 F | SYSTOLIC BLOOD PRESSURE: 151 MMHG | OXYGEN SATURATION: 94 % | DIASTOLIC BLOOD PRESSURE: 81 MMHG

## 2022-06-24 ASSESSMENT — PAIN SCALES - GENERAL: PAINLEVEL_OUTOF10: 9

## 2022-06-24 ASSESSMENT — PAIN DESCRIPTION - ORIENTATION: ORIENTATION: LOWER

## 2022-06-24 ASSESSMENT — PAIN DESCRIPTION - LOCATION: LOCATION: BACK;KNEE

## 2022-06-24 NOTE — H&P
Hospital Medicine  Pre-Op History & Physical        Chief Complaint:  Urinary incontinence    Date of Service: Pt seen/examined on 06/24/22    History Of Present Illness:      64 y.o. male who we are asked to see/evaluate by Dr. Zahra Romero for pre-operative evaluation prior to an upcoming surgery. Patient has BPH with urinary incontinence. He plans to have a cystoscopy and urolift. He has a history of chronic back pain on methadone. He also takes xanax for anxiety. He has had several surgeries before and never had any operatives complications. Past Medical History:        Diagnosis Date    Arthritis     Asthma     Atelectasis     Bilateral low back pain with sciatica 01/19/2016    difficulty walking - uses cane or walker    Depression     Drug overdose 8/28/2016    Enlarged prostate     Fractured pelvis ([address]     Fractured rib     Hypertension     Irregular heartbeat     Lumbar degenerative disc disease 1/19/2016    Methadone use 10/13/2014    Other chest pain 5/2/2019    Pneumonia     Pneumothorax, left     Polyp of transverse colon     RBBB     Sleep apnea     did not tolerate CPAP    Urinary incontinence        Past Surgical History:        Procedure Laterality Date    CARDIAC SURGERY      cardiac cath    COLONOSCOPY N/A 8/20/2019    COLONOSCOPY POLYPECTOMY REMOVAL HOT BIOPSY performed by Meseret Cadena MD at [facility] Left     ulna    KNEE ARTHROPLASTY Left     wears brace left leg    LAMINECTOMY      2 partial laminectomy    LUMBAR FUSION      LUMBAR SPINE SURGERY      pain stimulator placed in lower back left side L4-L5 then removed       Medications Prior to Admission:    Prior to Admission medications    Medication Sig Start Date End Date Taking?  Authorizing Provider   traZODone (DESYREL) 100 MG tablet Take 100 mg by mouth nightly as needed for Sleep    Historical Provider, MD   aspirin EC 81 MG EC tablet Take 1 tablet by mouth daily 1/19/22 Henok Leach,    ALPRAZolam (XANAX) 2 MG tablet Take 2 mg by mouth 3 times daily as needed for Anxiety. 7/1/20   Historical Provider, MD   methadone (DOLOPHINE) 5 MG tablet Take 5 mg by mouth every 4 hours as needed for Pain. Historical Provider, MD       Allergies:  Demerol hcl [meperidine], Gabapentin, and Zoloft [sertraline]    Social History:      The patient currently lives at home    TOBACCO:   reports that he has been smoking cigarettes. He has a 11.25 pack-year smoking history. He has never used smokeless tobacco.  ETOH:   reports no history of alcohol use. Family History:     Reviewed in detail and negative for DM, CAD. Positive as follows:        Problem Relation Age of Onset    Cancer Father     Stroke Sister     Cancer Brother        REVIEW OF SYSTEMS:   Pertinent positives as noted in the HPI. All other systems reviewed and negative. PHYSICAL EXAM:  BP (!) 151/81   Pulse 72   Temp 98 °F (36.7 °C) (Oral)   Resp 20   Ht 6' (1.829 m)   Wt 230 lb (104.3 kg)   SpO2 94%   BMI 31.19 kg/m²     General appearance: No apparent distress, appears stated age and cooperative. HEENT: Normal cephalic, atraumatic without obvious deformity. Pupils equal, round, and reactive to light. Extra ocular muscles intact. Conjunctivae/corneas clear. Neck: Supple, with full range of motion. No jugular venous distention. Trachea midline. Respiratory:  Normal respiratory effort. Clear to auscultation, bilaterally without Rales/Wheezes/Rhonchi. Cardiovascular: Regular rate and rhythm with normal S1/S2 without murmurs, rubs or gallops. Abdomen: Soft, RLQ tenderness, non-distended with normal bowel sounds. Musculoskeletal: No clubbing, cyanosis or edema bilaterally. Full ROM of all extremities. Skin: Skin color, texture, turgor normal.  No rashes or lesions. Neurologic:  Neurovascularly intact without any focal sensory/motor deficits.  Cranial nerves: II-XII intact, grossly non-focal.  Psychiatric: Alert and oriented, thought content appropriate, normal insight      EKG:  I have reviewed the EKG with the following interpretation:    @ekgread@    Labs:     Lab Results   Component Value Date    WBC 6.7 05/20/2022    HGB 14.2 05/20/2022    HCT 42.7 05/20/2022    MCV 85.1 05/20/2022     05/20/2022     Lab Results   Component Value Date     05/20/2022    K 4.2 05/20/2022     05/20/2022    CO2 29 05/20/2022    BUN 18 05/20/2022    CREATININE 0.8 05/20/2022    GLUCOSE 86 05/20/2022    CALCIUM 9.7 05/20/2022    PROT 6.6 08/20/2021    LABALBU 4.0 08/20/2021    BILITOT 0.3 08/20/2021    ALKPHOS 82 08/20/2021    AST 14 (L) 08/20/2021    ALT 12 08/20/2021    LABGLOM >60 05/20/2022    GFRAA >60 05/20/2022    AGRATIO 1.5 08/20/2021    GLOB 2.6 08/20/2021       ASSESSMENT/PLAN:  BPH with urinary incontinence  - plan for urolift with Dr. Adelina Loredo on 6/28  - EKG reviewed. >4 METs. No further cardiac testing needed    Anxiety/Depression  - mood stable  - continue xanax, trazodone  - OARRS reviewed    Opioid dependence 2/2 chronic low back pain  - continue methadone  - OARRS reviewed    Patient is considered low risk for anesthesia. Based on the above evaluation, the benefits of the planned procedure likely exceed the risks. The patient is medically optimized to proceed with the planned procedure without any further cardiopulmonary testing.     Reji Garduno MD MD

## 2022-06-28 ENCOUNTER — ANESTHESIA EVENT (OUTPATIENT)
Dept: OPERATING ROOM | Age: 62
End: 2022-06-28
Payer: COMMERCIAL

## 2022-06-28 ENCOUNTER — ANESTHESIA (OUTPATIENT)
Dept: OPERATING ROOM | Age: 62
End: 2022-06-28
Payer: COMMERCIAL

## 2022-06-28 ENCOUNTER — HOSPITAL ENCOUNTER (OUTPATIENT)
Age: 62
Setting detail: OUTPATIENT SURGERY
Discharge: HOME | End: 2022-06-28
Attending: UROLOGY | Admitting: UROLOGY
Payer: COMMERCIAL

## 2022-06-28 VITALS
BODY MASS INDEX: 31.19 KG/M2 | TEMPERATURE: 96.5 F | OXYGEN SATURATION: 93 % | RESPIRATION RATE: 16 BRPM | WEIGHT: 230 LBS | DIASTOLIC BLOOD PRESSURE: 81 MMHG | SYSTOLIC BLOOD PRESSURE: 127 MMHG | HEART RATE: 63 BPM

## 2022-06-28 PROCEDURE — 2709999900 HC NON-CHARGEABLE SUPPLY: Performed by: UROLOGY

## 2022-06-28 PROCEDURE — A4217 STERILE WATER/SALINE, 500 ML: HCPCS | Performed by: UROLOGY

## 2022-06-28 PROCEDURE — 7100000000 HC PACU RECOVERY - FIRST 15 MIN: Performed by: UROLOGY

## 2022-06-28 PROCEDURE — 3700000001 HC ADD 15 MINUTES (ANESTHESIA): Performed by: UROLOGY

## 2022-06-28 PROCEDURE — 3700000000 HC ANESTHESIA ATTENDED CARE: Performed by: UROLOGY

## 2022-06-28 PROCEDURE — 2580000003 HC RX 258: Performed by: UROLOGY

## 2022-06-28 PROCEDURE — 6360000002 HC RX W HCPCS: Performed by: NURSE ANESTHETIST, CERTIFIED REGISTERED

## 2022-06-28 PROCEDURE — 7100000001 HC PACU RECOVERY - ADDTL 15 MIN: Performed by: UROLOGY

## 2022-06-28 PROCEDURE — 2580000003 HC RX 258: Performed by: NURSE ANESTHETIST, CERTIFIED REGISTERED

## 2022-06-28 PROCEDURE — 2500000003 HC RX 250 WO HCPCS: Performed by: NURSE ANESTHETIST, CERTIFIED REGISTERED

## 2022-06-28 PROCEDURE — C1889 IMPLANT/INSERT DEVICE, NOC: HCPCS | Performed by: UROLOGY

## 2022-06-28 PROCEDURE — 6360000002 HC RX W HCPCS: Performed by: UROLOGY

## 2022-06-28 PROCEDURE — 3600000003 HC SURGERY LEVEL 3 BASE: Performed by: UROLOGY

## 2022-06-28 PROCEDURE — 7100000010 HC PHASE II RECOVERY - FIRST 15 MIN: Performed by: UROLOGY

## 2022-06-28 PROCEDURE — 7100000011 HC PHASE II RECOVERY - ADDTL 15 MIN: Performed by: UROLOGY

## 2022-06-28 PROCEDURE — 3600000013 HC SURGERY LEVEL 3 ADDTL 15MIN: Performed by: UROLOGY

## 2022-06-28 PROCEDURE — C1889 IMPLANT/INSERT DEVICE, NOC: HCPCS

## 2022-06-28 RX ORDER — PROPOFOL 10 MG/ML
INJECTION, EMULSION INTRAVENOUS PRN
Status: DISCONTINUED | OUTPATIENT
Start: 2022-06-28 | End: 2022-06-28 | Stop reason: SDUPTHER

## 2022-06-28 RX ORDER — KETOROLAC TROMETHAMINE 10 MG/1
10 TABLET, FILM COATED ORAL EVERY 6 HOURS PRN
Qty: 20 TABLET | Refills: 0 | Status: SHIPPED | OUTPATIENT
Start: 2022-06-28 | End: 2023-06-28

## 2022-06-28 RX ORDER — LIDOCAINE HYDROCHLORIDE 10 MG/ML
2 INJECTION, SOLUTION INFILTRATION; PERINEURAL
Status: DISCONTINUED | OUTPATIENT
Start: 2022-06-28 | End: 2022-06-28 | Stop reason: HOSPADM

## 2022-06-28 RX ORDER — DEXAMETHASONE SODIUM PHOSPHATE 10 MG/ML
INJECTION INTRAMUSCULAR; INTRAVENOUS PRN
Status: DISCONTINUED | OUTPATIENT
Start: 2022-06-28 | End: 2022-06-28 | Stop reason: SDUPTHER

## 2022-06-28 RX ORDER — DIPHENHYDRAMINE HYDROCHLORIDE 50 MG/ML
12.5 INJECTION INTRAMUSCULAR; INTRAVENOUS
Status: CANCELLED | OUTPATIENT
Start: 2022-06-28 | End: 2022-06-28

## 2022-06-28 RX ORDER — LIDOCAINE HYDROCHLORIDE 20 MG/ML
INJECTION, SOLUTION EPIDURAL; INFILTRATION; INTRACAUDAL; PERINEURAL PRN
Status: DISCONTINUED | OUTPATIENT
Start: 2022-06-28 | End: 2022-06-28 | Stop reason: SDUPTHER

## 2022-06-28 RX ORDER — MAGNESIUM HYDROXIDE 1200 MG/15ML
LIQUID ORAL CONTINUOUS PRN
Status: COMPLETED | OUTPATIENT
Start: 2022-06-28 | End: 2022-06-28

## 2022-06-28 RX ORDER — GLYCOPYRROLATE 0.2 MG/ML
INJECTION INTRAMUSCULAR; INTRAVENOUS PRN
Status: DISCONTINUED | OUTPATIENT
Start: 2022-06-28 | End: 2022-06-28 | Stop reason: SDUPTHER

## 2022-06-28 RX ORDER — FENTANYL CITRATE 50 UG/ML
INJECTION, SOLUTION INTRAMUSCULAR; INTRAVENOUS PRN
Status: DISCONTINUED | OUTPATIENT
Start: 2022-06-28 | End: 2022-06-28 | Stop reason: SDUPTHER

## 2022-06-28 RX ORDER — SODIUM CHLORIDE 0.9 % (FLUSH) 0.9 %
5-40 SYRINGE (ML) INJECTION EVERY 12 HOURS SCHEDULED
Status: CANCELLED | OUTPATIENT
Start: 2022-06-28

## 2022-06-28 RX ORDER — LABETALOL HYDROCHLORIDE 5 MG/ML
10 INJECTION, SOLUTION INTRAVENOUS
Status: CANCELLED | OUTPATIENT
Start: 2022-06-28

## 2022-06-28 RX ORDER — SODIUM CHLORIDE, SODIUM LACTATE, POTASSIUM CHLORIDE, CALCIUM CHLORIDE 600; 310; 30; 20 MG/100ML; MG/100ML; MG/100ML; MG/100ML
INJECTION, SOLUTION INTRAVENOUS CONTINUOUS
Status: DISCONTINUED | OUTPATIENT
Start: 2022-06-28 | End: 2022-06-28 | Stop reason: HOSPADM

## 2022-06-28 RX ORDER — CEPHALEXIN 500 MG/1
500 CAPSULE ORAL 2 TIMES DAILY
Qty: 10 CAPSULE | Refills: 0 | Status: SHIPPED | OUTPATIENT
Start: 2022-06-28 | End: 2022-07-03

## 2022-06-28 RX ORDER — SODIUM CHLORIDE 0.9 % (FLUSH) 0.9 %
5-40 SYRINGE (ML) INJECTION PRN
Status: CANCELLED | OUTPATIENT
Start: 2022-06-28

## 2022-06-28 RX ORDER — OXYCODONE HYDROCHLORIDE 5 MG/1
10 TABLET ORAL PRN
Status: CANCELLED | OUTPATIENT
Start: 2022-06-28 | End: 2022-06-28

## 2022-06-28 RX ORDER — SODIUM CHLORIDE 9 MG/ML
25 INJECTION, SOLUTION INTRAVENOUS PRN
Status: CANCELLED | OUTPATIENT
Start: 2022-06-28

## 2022-06-28 RX ORDER — OXYCODONE HYDROCHLORIDE 5 MG/1
5 TABLET ORAL PRN
Status: CANCELLED | OUTPATIENT
Start: 2022-06-28 | End: 2022-06-28

## 2022-06-28 RX ORDER — ONDANSETRON 2 MG/ML
4 INJECTION INTRAMUSCULAR; INTRAVENOUS
Status: CANCELLED | OUTPATIENT
Start: 2022-06-28 | End: 2022-06-28

## 2022-06-28 RX ORDER — ONDANSETRON 2 MG/ML
INJECTION INTRAMUSCULAR; INTRAVENOUS PRN
Status: DISCONTINUED | OUTPATIENT
Start: 2022-06-28 | End: 2022-06-28 | Stop reason: SDUPTHER

## 2022-06-28 RX ORDER — SODIUM CHLORIDE, SODIUM LACTATE, POTASSIUM CHLORIDE, CALCIUM CHLORIDE 600; 310; 30; 20 MG/100ML; MG/100ML; MG/100ML; MG/100ML
INJECTION, SOLUTION INTRAVENOUS CONTINUOUS PRN
Status: DISCONTINUED | OUTPATIENT
Start: 2022-06-28 | End: 2022-06-28 | Stop reason: SDUPTHER

## 2022-06-28 RX ADMIN — DEXAMETHASONE SODIUM PHOSPHATE 10 MG: 10 INJECTION INTRAMUSCULAR; INTRAVENOUS at 12:50

## 2022-06-28 RX ADMIN — SODIUM CHLORIDE, SODIUM LACTATE, POTASSIUM CHLORIDE, AND CALCIUM CHLORIDE: .6; .31; .03; .02 INJECTION, SOLUTION INTRAVENOUS at 12:44

## 2022-06-28 RX ADMIN — CEFAZOLIN 2 G: 10 INJECTION, POWDER, FOR SOLUTION INTRAVENOUS at 12:44

## 2022-06-28 RX ADMIN — ONDANSETRON 4 MG: 2 INJECTION INTRAMUSCULAR; INTRAVENOUS at 12:44

## 2022-06-28 RX ADMIN — LIDOCAINE HYDROCHLORIDE 40 MG: 20 INJECTION, SOLUTION EPIDURAL; INFILTRATION; INTRACAUDAL; PERINEURAL at 12:50

## 2022-06-28 RX ADMIN — PROPOFOL 200 MG: 10 INJECTION, EMULSION INTRAVENOUS at 12:50

## 2022-06-28 RX ADMIN — GLYCOPYRROLATE 0.3 MG: 0.2 INJECTION, SOLUTION INTRAMUSCULAR; INTRAVENOUS at 13:02

## 2022-06-28 RX ADMIN — FENTANYL CITRATE 50 MCG: 50 INJECTION INTRAMUSCULAR; INTRAVENOUS at 12:50

## 2022-06-28 ASSESSMENT — PAIN SCALES - GENERAL
PAINLEVEL_OUTOF10: 0
PAINLEVEL_OUTOF10: 4

## 2022-06-28 ASSESSMENT — PAIN DESCRIPTION - LOCATION: LOCATION: KNEE

## 2022-06-28 ASSESSMENT — LIFESTYLE VARIABLES: SMOKING_STATUS: 1

## 2022-06-28 ASSESSMENT — PAIN DESCRIPTION - ORIENTATION: ORIENTATION: LEFT

## 2022-06-28 NOTE — ANESTHESIA PRE PROCEDURE
Department of Anesthesiology  Preprocedure Note       Name:  Tere Rader   Age:  64 y.o.  :  1960                                          MRN:  [id number]         Date:  2022      Surgeon: Sarah Bautista):  Johana Sousa MD    Procedure: Procedure(s):  CYSTOSCOPY, UROLIFT    Medications prior to admission:   Prior to Admission medications    Medication Sig Start Date End Date Taking? Authorizing Provider   traZODone (DESYREL) 100 MG tablet Take 100 mg by mouth nightly as needed for Sleep    Historical Provider, MD   aspirin EC 81 MG EC tablet Take 1 tablet by mouth daily 22   Henok Kimdox,    ALPRAZolam (XANAX) 2 MG tablet Take 2 mg by mouth 3 times daily as needed for Anxiety. 20   Historical Provider, MD   methadone (DOLOPHINE) 5 MG tablet Take 5 mg by mouth every 4 hours as needed for Pain. Historical Provider, MD       Current medications:    Current Facility-Administered Medications   Medication Dose Route Frequency Provider Last Rate Last Admin    ceFAZolin (ANCEF) 2000 mg in dextrose 5 % 100 mL IVPB  2,000 mg IntraVENous On Call to [address]        lidocaine 1 % injection 2 mL  2 mL IntraDERmal Once PRN Mariama Epps MD        lactated ringers infusion   IntraVENous Continuous Mariama Epps MD           Allergies:     Allergies   Allergen Reactions    Demerol Hcl [Meperidine] Anaphylaxis    Gabapentin Shortness Of Breath    Zoloft [Sertraline] Other (See Comments)     headaches       Problem List:    Patient Active Problem List   Diagnosis Code    Chronic pain disorder G89.4    Hx of decompressive lumbar laminectomy Z98.890    Mediastinal hematoma S27.892A    Recurrent major depressive disorder, in remission ([facility] [address] F33.40    Lower extremity edema R60.0    Avascular necrosis (HCC) M87.00    Tobacco abuse Z72.0    Anxiety F41.9    Primary osteoarthritis of right knee M17.11    Hypertension I10       Past Medical History:        Diagnosis Date  Arthritis     Asthma     Atelectasis     Bilateral low back pain with sciatica 01/19/2016    difficulty walking - uses cane or walker    Depression     Drug overdose 8/28/2016    Enlarged prostate     Fractured pelvis ([address]     Fractured rib     Hypertension     Irregular heartbeat     Lumbar degenerative disc disease 1/19/2016    Methadone use 10/13/2014    Other chest pain 5/2/2019    Pneumonia     Pneumothorax, left     Polyp of transverse colon     RBBB     Sleep apnea     did not tolerate CPAP    Urinary incontinence        Past Surgical History:        Procedure Laterality Date    CARDIAC SURGERY      cardiac cath    COLONOSCOPY N/A 8/20/2019    COLONOSCOPY POLYPECTOMY REMOVAL HOT BIOPSY performed by Eula Lofton MD at [employer] [facility] Left     ulna    KNEE ARTHROPLASTY Left     wears brace left leg    LAMINECTOMY      2 partial laminectomy    LUMBAR FUSION      LUMBAR SPINE SURGERY      pain stimulator placed in lower back left side L4-L5 then removed       Social History:    Social History     Tobacco Use    Smoking status: Light Tobacco Smoker     Packs/day: 0.75     Years: 15.00     Pack years: 11.25     Types: Cigarettes    Smokeless tobacco: Never Used   Substance Use Topics    Alcohol use:  No                                Ready to quit: Not Answered  Counseling given: Not Answered      Vital Signs (Current):   Vitals:    06/23/22 1013 06/28/22 1108   BP:  116/75   Pulse:  55   Resp:  16   Temp:  97.5 °F (36.4 °C)   TempSrc:  Temporal   SpO2:  94%   Weight: 230 lb (104.3 kg)                                               BP Readings from Last 3 Encounters:   06/28/22 116/75   06/24/22 (!) 151/81   05/20/22 130/70       NPO Status: Time of last liquid consumption: 2359                        Time of last solid consumption: 2359                        Date of last liquid consumption: 06/27/22                        Date of last solid food consumption: 06/27/22    BMI:   Wt Readings from Last 3 Encounters:   06/23/22 230 lb (104.3 kg)   06/24/22 230 lb (104.3 kg)   05/20/22 234 lb 12.8 oz (106.5 kg)     Body mass index is 31.19 kg/m². CBC:   Lab Results   Component Value Date    WBC 6.7 05/20/2022    RBC 5.02 05/20/2022    HGB 14.2 05/20/2022    HCT 42.7 05/20/2022    MCV 85.1 05/20/2022    RDW 14.4 05/20/2022     05/20/2022       CMP:   Lab Results   Component Value Date     05/20/2022    K 4.2 05/20/2022    K 4.1 02/17/2021     05/20/2022    CO2 29 05/20/2022    BUN 18 05/20/2022    CREATININE 0.8 05/20/2022    GFRAA >60 05/20/2022    AGRATIO 1.5 08/20/2021    LABGLOM >60 05/20/2022    GLUCOSE 86 05/20/2022    PROT 6.6 08/20/2021    CALCIUM 9.7 05/20/2022    BILITOT 0.3 08/20/2021    ALKPHOS 82 08/20/2021    AST 14 08/20/2021    ALT 12 08/20/2021       POC Tests: No results for input(s): POCGLU, POCNA, POCK, POCCL, POCBUN, POCHEMO, POCHCT in the last 72 hours.     Coags:   Lab Results   Component Value Date    PROTIME 12.3 09/11/2020    INR 1.06 09/11/2020    APTT 37.2 09/11/2020       HCG (If Applicable): No results found for: PREGTESTUR, PREGSERUM, HCG, HCGQUANT     ABGs:   Lab Results   Component Value Date    PHART 7.365 08/29/2016    PO2ART 62.2 08/29/2016    CNS1ZIE 55.1 08/29/2016    NAU3LRK 30.8 08/29/2016    BEART 4.1 08/29/2016    Z0CJKANX 91.1 08/29/2016        Type & Screen (If Applicable):  No results found for: LABABO, LABRH    Drug/Infectious Status (If Applicable):  No results found for: HIV, HEPCAB    COVID-19 Screening (If Applicable):   Lab Results   Component Value Date    COVID19 NOT DETECTED 09/22/2020           Anesthesia Evaluation   no history of anesthetic complications:   Airway: Mallampati: II  TM distance: >3 FB   Neck ROM: full  Mouth opening: > = 3 FB   Dental:    (+) upper dentures      Pulmonary:   (+) pneumonia: resolved,  sleep apnea: on noncompliant,  asthma: current smoker                           Cardiovascular:    (+) hypertension:, dysrhythmias:,                   Neuro/Psych:   (+) neuromuscular disease:, psychiatric history:depression/anxiety             GI/Hepatic/Renal:             Endo/Other:    (+) : arthritis: OA., .                 Abdominal:             Vascular: Other Findings:           Anesthesia Plan      general     ASA 3     (Pt agrees to risks, benefits and alternatives of GETA. Questions answered. Willing to proceed with plan.)  Induction: intravenous. Anesthetic plan and risks discussed with patient.                         Delon Perry MD   6/28/2022

## 2022-06-28 NOTE — PROGRESS NOTES
Pre-procedure complete. Assessment complete, see flowsheets. Denies needs or discomforts at this time. Call light in easy reach, bedside table in easy reach, and bed in lowest position.   Paulina Irvin RN

## 2022-06-28 NOTE — PROGRESS NOTES
Patient argumenatitve with RN concerning get up, dressed and going home. RN explained to patient that he needs to wake up and be monitored for an hour before being able to go home. Patient repeatedly insisting that his clothes be given to him and that he get up and get dressed and urinate.

## 2022-06-28 NOTE — PROGRESS NOTES
Pt up to the bathroom to void without difficulty, states urine pink without clots. Discharge instructions given to pt and family, brother. Verbalized understanding. PIV removed. Pt dressed and wheeled out and discharged to the care of their family in stable condition.

## 2022-06-28 NOTE — BRIEF OP NOTE
Brief Postoperative Note      Patient: Irene Ratliff  YOB: 1960  MRN: [id number]    Date of Procedure: 6/28/2022    Pre-Op Diagnosis: ENLARGED PROSTATE WITH LOWER URINARY TRACT SYMPTOMS    Post-Op Diagnosis: Same       Procedure(s):  CYSTOSCOPY, UROLIFT    Surgeon(s):  Marilynn Black MD    Assistant:  Surgical Assistant: Vonda Pierce    Anesthesia: General    Estimated Blood Loss (mL): Minimal    Complications: None    Specimens:   * No specimens in log *    Implants:  Implant Name Type Inv.  Item Serial No.  Lot No. LRB No. Used Action   SYSTEM URO W/ IMPL DEL DEV FOR TREAT OF URIN OUTFLO - [id number]  SYSTEM URO W/ IMPL DEL DEV FOR TREAT OF URIN OUTFLO  [employer] [id number] N/A 6 Implanted         Drains: * No LDAs found *    Findings: 2 misfires, 2 implanted on right and 3 implanted on left    Plan- f/u in 1 month    Electronically signed by Marilynn Black MD on 6/28/2022 at 1:10 PM

## 2022-06-28 NOTE — PROGRESS NOTES
Patient arrived in PACU at this time and placed on monitor. Report received from [address]. Will continue to monitor. Patient has oral airway in place.

## 2022-06-29 NOTE — ANESTHESIA POSTPROCEDURE EVALUATION
Department of Anesthesiology  Postprocedure Note    Patient: Jerry Honeycutt  MRN: [id number]  [place]: 1960  Date of evaluation: 6/28/2022      Procedure Summary     Date: 06/28/22 Room / Location: [facility] OR [address] [place] [address]    Anesthesia Start: 1244 Anesthesia Stop: 1326    Procedure: CYSTOSCOPY, UROLIFT (N/A Urethra) Diagnosis:       Benign prostatic hyperplasia with lower urinary tract symptoms, symptom details unspecified      (ENLARGED PROSTATE WITH LOWER URINARY TRACT SYMPTOMS)    Surgeons: Dhaval Figueredo MD Responsible Provider: Tru Escobar MD    Anesthesia Type: general ASA Status: 3          Anesthesia Type: No value filed. Pipe Phase I: Pipe Score: 9    Pipe Phase II: Pipe Score: 10      Anesthesia Post Evaluation    Patient location during evaluation: PACU  Patient participation: complete - patient participated  Level of consciousness: awake and alert  Airway patency: patent  Nausea & Vomiting: no nausea and no vomiting  Complications: no  Cardiovascular status: blood pressure returned to baseline  Respiratory status: acceptable  Hydration status: euvolemic  Comments: VSS on transfer to phase 2 recovery. No anesthetic complications.

## 2022-06-29 NOTE — OP NOTE
[facility]                 Cris Charles                                OPERATIVE REPORT    PATIENT NAME: Kevin Guerrero                   :        1960  MED REC NO:   [id number]                          ROOM:  ACCOUNT NO:   [de-identified]                           ADMIT DATE: 2022  PROVIDER:     Nhan Polanco MD    DATE OF PROCEDURE:  2022    LOCATION:  [facility]    PREOPERATIVE DIAGNOSIS:  BPH. POSTOPERATIVE DIAGNOSIS:  BPH. PROCEDURE PERFORMED:  Cystoscopy with UroLift implant. SURGEON:  Jamaica Noel MD    INDICATIONS FOR THE PROCEDURE:  The patient is a 79-year-old male with  voiding difficulty despite medications. The risks and benefits of  cystoscopy and UroLift were discussed with the patient. He agreed to  proceed. He has had a cystoscopy and ultrasound of the prostate to  ensure that he is a good candidate. DESCRIPTION OF PROCEDURE:  The patient had preoperative antibiotics,  general anesthesia, was in lithotomy position. Genitalia were prepped  and draped in the usual sterile fashion. A 21-Kiswahili rigid cystoscope  was inserted into the patient's urethra. The patient has bilobar  hypertrophy that was mild to moderate in nature. I fired a total of  seven UroLift implants that were fired and implanted. Once implanted  were two on the right and three on the left, the first two were near the  bladder neck at the 10 o'clock and 2 o'clock position and the next two  were near the verumontanum at the 10 o'clock and 2 o'clock position. I  placed another stacking one on the left. The anterior channel was much  more open. I filled the patient's bladder full of water and he was able  to urinate in the recovery room. He was given prescriptions for pain  pills and antibiotics. PLAN:  We will see him back in the office in one month. ESTIMATED BLOOD LOSS:  10 mL.         Lionel Welsh MD    D: 06/28/2022 16:39:57       T: 06/28/2022 20:02:53     [id number]  Job#: [id number]     Doc#: [id number]    CC:

## 2022-07-27 ENCOUNTER — HOSPITAL ENCOUNTER (OUTPATIENT)
Age: 62
Discharge: HOME | End: 2022-07-27
Payer: COMMERCIAL

## 2022-07-27 LAB
BASOPHILS ABSOLUTE: 0 K/UL (ref 0–0.2)
BASOPHILS RELATIVE PERCENT: 0.4 %
C-REACTIVE PROTEIN: 7.7 MG/L (ref 0–5.1)
EOSINOPHILS ABSOLUTE: 0.1 K/UL (ref 0–0.6)
EOSINOPHILS RELATIVE PERCENT: 1.5 %
HCT VFR BLD CALC: 42.6 % (ref 40.5–52.5)
HEMOGLOBIN: 14.5 G/DL (ref 13.5–17.5)
LYMPHOCYTES ABSOLUTE: 2 K/UL (ref 1–5.1)
LYMPHOCYTES RELATIVE PERCENT: 29.8 %
MCH RBC QN AUTO: 28.8 PG (ref 26–34)
MCHC RBC AUTO-ENTMCNC: 33.9 G/DL (ref 31–36)
MCV RBC AUTO: 85 FL (ref 80–100)
MONOCYTES ABSOLUTE: 0.3 K/UL (ref 0–1.3)
MONOCYTES RELATIVE PERCENT: 4.8 %
NEUTROPHILS ABSOLUTE: 4.3 K/UL (ref 1.7–7.7)
NEUTROPHILS RELATIVE PERCENT: 63.5 %
PDW BLD-RTO: 14.7 % (ref 12.4–15.4)
PLATELET # BLD: 145 K/UL (ref 135–450)
PMV BLD AUTO: 9.3 FL (ref 5–10.5)
RBC # BLD: 5.01 M/UL (ref 4.2–5.9)
SEDIMENTATION RATE, ERYTHROCYTE: 14 MM/HR (ref 0–20)
WBC # BLD: 6.8 K/UL (ref 4–11)

## 2022-07-27 PROCEDURE — 36415 COLL VENOUS BLD VENIPUNCTURE: CPT

## 2022-07-27 PROCEDURE — 85025 COMPLETE CBC W/AUTO DIFF WBC: CPT

## 2022-07-27 PROCEDURE — 85652 RBC SED RATE AUTOMATED: CPT

## 2022-07-27 PROCEDURE — 86140 C-REACTIVE PROTEIN: CPT

## 2022-08-22 NOTE — FLOWSHEET NOTE
06/28/22 1401   Vital Signs   Temp (!) 96.5 °F (35.8 °C)   Temp Source Temporal   Heart Rate 63   Heart Rate Source Monitor   Resp 16   /81   BP Location Right upper arm   MAP (Calculated) 96.33   Level of Consciousness Alert (0)   MEWS Score 1   Pain Assessment   Pain Assessment None - Denies Pain   Pain Level 0   Oxygen Therapy   SpO2 93 %   O2 Device None (Room air)   Transferred to [facility] in preparation for discharge. Valtrex Pregnancy And Lactation Text: this medication is Pregnancy Category B and is considered safe during pregnancy. This medication is not directly found in breast milk but it's metabolite acyclovir is present.

## 2022-09-12 ENCOUNTER — TELEPHONE (OUTPATIENT)
Dept: FAMILY MEDICINE CLINIC | Age: 62
End: 2022-09-12

## 2022-10-27 ENCOUNTER — TELEPHONE (OUTPATIENT)
Dept: FAMILY MEDICINE CLINIC | Age: 62
End: 2022-10-27

## 2022-10-27 DIAGNOSIS — M19.90 OSTEOARTHRITIS, UNSPECIFIED OSTEOARTHRITIS TYPE, UNSPECIFIED SITE: Primary | ICD-10-CM

## 2022-10-27 NOTE — TELEPHONE ENCOUNTER
Pt requesting referral to ortho for a 2nd opinion ,had left knee surgery 10 months ago at [place] [address] ,not happy with the surgeon ,he needs both knees done and than back surgery. please call pt  [phone]

## 2022-12-13 ENCOUNTER — OFFICE VISIT (OUTPATIENT)
Dept: ORTHOPEDIC SURGERY | Age: 62
End: 2022-12-13

## 2022-12-13 VITALS — HEIGHT: 72 IN | BODY MASS INDEX: 31.15 KG/M2 | WEIGHT: 230 LBS

## 2022-12-13 DIAGNOSIS — Z96.652 HISTORY OF TOTAL KNEE ARTHROPLASTY, LEFT: Primary | ICD-10-CM

## 2022-12-13 DIAGNOSIS — M25.561 RIGHT KNEE PAIN, UNSPECIFIED CHRONICITY: ICD-10-CM

## 2022-12-13 NOTE — PROGRESS NOTES
Dr Ricki Beaver      Date /Time 12/13/2022       11:27 AM EDT  Name Jayme Mendoza             1960   Location  [address] [place] [address]  MRN [id number]                No chief complaint on file. History of Present Illness  Jayme Mendoza is a 58 y.o. male who presents with  bilateral knee pain,. Occupation:  Disabled  Injury Mechanism:  none. Worker's Comp. & legal issues:   none. Previous Treatments: Ice, Heat, NSAIDs and pain medication    Patient presents to the office today with continued left greater than right knee pain. As below patient did have a left total knee arthroplasty done by Dr. Danyel Joel at [facility] on February 12, 2021. Continues to have left knee pain. He has also been diagnosed with advanced right knee osteoarthritis and will also need a total knee arthroplasty at some point in the future. Unfortunately he has not done much if anything in terms of medical optimization since last time. Patient continues to smoke on a daily basis. He has not yet received clearance from his psychologist because of his son's death which she sees on a regular basis. Lastly he still takes methadone 5 mg 3 times daily. I have reviewed his OARRS report and he gets 90 pills every 30 days and this has not changed in recent history. We have asked him to cut down on the amount of pain medication. He will need to cut it down to at most 1 pill a day. Previous history: Patient presents the office today for a follow-up visit. Patient stated below did have a left total knee arthroplasty done by Dr. Danyel Joel February 12, 2021. Patient suffered stiffness and extreme swelling after surgery. Continues to have pain. He is also being treated for his right knee. He has known osteoarthritis and does need a total knee arthroplasty but needs significant medical optimization before hand. Previous history: Patient is here with a chief complaint of left knee pain.   Patient's left knee has been painful for many years. He did have a left total knee arthroplasty done February 12, 2021 by Dr. Caterina Blackwell at [facility].  Postoperatively he suffered from significant swelling. He did share pictures which demonstrated fracture blisters present. He also states the swelling was so severe that they had to cut his JUWAN hose and shoe off of them. He did have physical therapy but it did not start for approximately a week. He suffers from pain and loss of motion. Denies any fever or chills. Patient is also here with a chief complaint of right knee pain. Patient's right knee also has been painful for many years. His pain is concentrated over the medial aspect. He has had cortisone injections and viscosupplementation injections without improvement. He is currently using an  brace without help. He has had no recent injury or trauma. Patient does have several factors that are notable in his history. He did have a Workmen's Compensation claim for his lumbar spine. He eventually went on cortication bilateral knees were amended to the claim. He has settled this claim and no longer has any open claims for his knees. Also patient is a smoker. In addition he does take methadone and suffers from continued depression due to loss of his son in 2018.     Past History  Past Medical History:   Diagnosis Date    Arthritis     Asthma     Atelectasis     Bilateral low back pain with sciatica 01/19/2016    difficulty walking - uses cane or walker    Depression     Drug overdose 8/28/2016    Enlarged prostate     Fractured pelvis ([facility] [address]     Fractured rib     Hypertension     Irregular heartbeat     Lumbar degenerative disc disease 1/19/2016    Methadone use 10/13/2014    Other chest pain 5/2/2019    Pneumonia     Pneumothorax, left     Polyp of transverse colon     RBBB     Sleep apnea     did not tolerate CPAP    Urinary incontinence      Past Surgical History:   Procedure Laterality Date    CARDIAC SURGERY cardiac cath    COLONOSCOPY N/A 8/20/2019    COLONOSCOPY POLYPECTOMY REMOVAL HOT BIOPSY performed by Venecia Nolasco MD at [address] N/A 6/28/2022    Cody Bob performed by Shanice Lemus MD at [facility] [address] Left     ulna    KNEE ARTHROPLASTY Left     wears brace left leg    LAMINECTOMY      2 partial laminectomy    LUMBAR FUSION      LUMBAR SPINE SURGERY      pain stimulator placed in lower back left side L4-L5 then removed     Social History     Tobacco Use    Smoking status: Light Smoker     Packs/day: 0.75     Years: 15.00     Pack years: 11.25     Types: Cigarettes    Smokeless tobacco: Never   Substance Use Topics    Alcohol use: No      Current Outpatient Medications on File Prior to Visit   Medication Sig Dispense Refill    ketorolac (TORADOL) 10 MG tablet Take 1 tablet by mouth every 6 hours as needed for Pain 20 tablet 0    traZODone (DESYREL) 100 MG tablet Take 100 mg by mouth nightly as needed for Sleep      aspirin EC 81 MG EC tablet Take 1 tablet by mouth daily 90 tablet 3    ALPRAZolam (XANAX) 2 MG tablet Take 2 mg by mouth 3 times daily as needed for Anxiety. methadone (DOLOPHINE) 5 MG tablet Take 5 mg by mouth every 4 hours as needed for Pain. No current facility-administered medications on file prior to visit. ASCVD 10-YEAR RISK SCORE  The ASCVD Risk score (Lisa DK, et al., 2019) failed to calculate for the following reasons:    Cannot find a previous HDL lab    Cannot find a previous total cholesterol lab     Review of Systems  10-point ROS is negative other than HPI. Physical Exam  Based off 1997 Exam Criteria  There were no vitals taken for this visit. Constitutional:       General: He is not in acute distress. Appearance: Normal appearance. Cardiovascular:      Rate and Rhythm: Normal rate and regular rhythm. Pulses: Normal pulses.    Pulmonary:      Effort: Pulmonary effort is normal. No respiratory distress. Neurological:      Mental Status: He is alert and oriented to person, place, and time. Mental status is at baseline. Musculoskeletal:  Gait:  antalgic  Spencer Hip: Examination of the right and left hip reveals intact skin. The patient demonstrates full painless range of motion with regards to flexion, abduction, internal and external rotation. There is no tenderness about the greater trochanter. R Knee: Examination of the right knee reveals intact skin. There is focal tenderness over the medial greater than lateral joint line. Painful range of motion . No gross instability to either varus or valgus stress test.  L Knee: Examination of the left knee reveals intact skin. There is an incision consistent with previous total knee arthroplasty. Painful range of motion 5-90. No gross instability to either varus or valgus stress test with the knee fully extended. There is mid flexion instability compared to full extension. Quad atrophy present. Imaging  Bilateral Knee: [facility] [place]  Radiographs:   X-rays ordered today reviewed of the right knee. 4 views. Standing AP, standing AP flex, lateral, and skyline views. They demonstrate no evidence of fractures or dislocations. There is advanced medial joint space thinning with osteophyte formation. Patient does also have osteophyte formation patellofemoral joint with a bipartite patella    X-rays are also ordered and reviewed of the left knee. 3 views. Standing AP, lateral, and skyline views. No evidence of fractures or dislocations. There is no evidence of loosening or eccentric wear. Assessment and Plan  Diagnoses and all orders for this visit:    History of total knee arthroplasty, left  -     XR KNEE LEFT (3 VIEWS); Future    Right knee pain, unspecified chronicity  -     XR KNEE RIGHT (MIN 4 VIEWS); Future      In terms of patient's right knee he will need a total knee arthroplasty at some point in the future. Unfortunately he does have several factors that need medical optimization before hand. First he will need to be nicotine free for least 6 weeks before and 6 weeks after surgery. He will need to pass at least 1 nicotine test before he can get signed up for surgery. In addition he takes a large amount of pain medication for his back including but not limited to methadone. In addition patient did have a large amount of lower extremity swelling after his previous total knee arthroplasty. He will likely need a vascular consultation. He will also need clearance from his psychologist.  We have also asked him to decrease the use of his chronic pain medication. This will make it extremely difficult to impossible to control his pain afterwards if he continues to take high-dose narcotic pain medication. We have placed the patient into physical therapy. I discussed with Maddie Vasquez that his history, symptoms, signs and imaging are most consistent with knee arthritis and previous TKA replacement. We reviewed the natural history of these conditions and treatment options ranging from conservative measures (rest, icing, activity modification, physical therapy, pain meds, cortisone injection) to surgical options. In terms of treatment, I recommended continuing with rest, icing, avoidance of painful activities, NSAIDs or pain meds as tolerated, and physical therapy. If these are not effective, cortisone injection can be considered. We discussed surgical options as well, should conservative measures fail. Electronically signed by Freedom Mayfield MD on 12/13/2022 at 9:21 AM  This dictation was generated by voice recognition computer software. Although all attempts are made to edit the dictation for accuracy, there may be errors in the transcription that are not intended.

## 2023-01-04 ENCOUNTER — TELEPHONE (OUTPATIENT)
Dept: ORTHOPEDIC SURGERY | Age: 63
End: 2023-01-04

## 2023-01-04 ENCOUNTER — TELEPHONE (OUTPATIENT)
Dept: FAMILY MEDICINE CLINIC | Age: 63
End: 2023-01-04

## 2023-01-04 NOTE — TELEPHONE ENCOUNTER
1/4 - 11am approx - PM took NT call from pt  - pt got disconnected during transfer - PM called pt back - pt proceeded to state he needed seen for knee pain and disability paperwork. Pt was recently seen by Monique Trejo in December for knee pain - PM referred pt back to ortho for his concern - gave pt their ph # - pt didn't recall ever having that appt. PM scheduled appt for pt to see TL for the disability paperwork. Ended call   TL spoke to PM and stated that if the disability paperwork was for his knees - he would have to get that done thru ortho - asked for PM to cancel his appt and direct pt to ortho for this. PM called pt back and tried to explain to him that he needs to see ortho for the paperwork since that's who's seeing him for the issue. Pt is extremely confused- PM tried to get pt to understand and to call ortho for this. Pt got upset and hung up ph.   PM canceled his appt with TL.

## 2023-01-04 NOTE — TELEPHONE ENCOUNTER
Other PATIENT NEEDING TO SPEAK WITH SOMONE REGARDING HIS FMLA PAPERWORK.  PLS CALL TO ASSIST [phone]

## 2023-01-09 ENCOUNTER — TELEPHONE (OUTPATIENT)
Dept: FAMILY MEDICINE CLINIC | Age: 63
End: 2023-01-09

## 2023-01-09 NOTE — TELEPHONE ENCOUNTER
1/9/23 - 230pm - PM called pt - explained to him why he was dismissed - due to being non-compliant. Pt was referred to the [address] [place] [address] office, as he wanted to go the office by the school.

## 2023-01-09 NOTE — TELEPHONE ENCOUNTER
Patient wants to know why he was dismissed from our practice. He received the letter of dismissal but he doesn't understand what he did wrong. Can he have someone call him. ?

## 2023-02-08 ENCOUNTER — TELEPHONE (OUTPATIENT)
Dept: FAMILY MEDICINE CLINIC | Age: 63
End: 2023-02-08

## 2023-02-08 NOTE — TELEPHONE ENCOUNTER
Patient is requesting a return call from our  regarding his dismissal. He said he never received a letter about this. I told him he has already spoke with her and he insists he did not.

## 2023-02-10 NOTE — TELEPHONE ENCOUNTER
2/10/23 - PM called pt back again - 3rd time for same issue. PM reiterated the same information that was already given to him - PM told pt she would not be returning anymore of his calls, if he were to call back again. PM [facility] again to the pt and instructed him to call there to make an appt. Pt asked where Dr. Daniel Roca went, PM told him that he went to [facility] up in [place]. Pt was again extremely confused and forgetful. He has no recollection of our previous conversations.

## 2023-02-20 ENCOUNTER — TELEPHONE (OUTPATIENT)
Dept: ORTHOPEDIC SURGERY | Age: 63
End: 2023-02-20

## 2023-02-20 DIAGNOSIS — M17.11 PRIMARY OSTEOARTHRITIS OF RIGHT KNEE: Primary | ICD-10-CM

## 2023-02-20 NOTE — TELEPHONE ENCOUNTER
Entered for Auth - will call patient to schedule once approved     yonas
Other PATIENT CALL STATES THAT HE WOULD LIKE TO PROCEED WITH GETTING APPROVED FOR GEL INJECTIONS.  PLS CALL [phone] TO ADVISE
none

## 2023-02-27 DIAGNOSIS — M17.11 PRIMARY OSTEOARTHRITIS OF RIGHT KNEE: Primary | ICD-10-CM

## 2023-03-07 ENCOUNTER — TELEPHONE (OUTPATIENT)
Dept: ORTHOPEDIC SURGERY | Age: 63
End: 2023-03-07

## 2023-03-07 NOTE — TELEPHONE ENCOUNTER
EUFLEXXA  (SERIES OF 3) RIGHT KNEE     NO PA REQUIRED. VALID & BILLABLE ON CLAIM YES. BUY AND BILL. Per MEDICARE GUIDELINES.      Ok to begin anytime    Spoke w/ patient     jm

## 2023-04-19 LAB
AVERAGE GLUCOSE: 114
HBA1C MFR BLD: 5.6 %

## 2024-06-16 ENCOUNTER — HOSPITAL ENCOUNTER (INPATIENT)
Age: 64
LOS: 1 days | Discharge: HOME | DRG: 920 | End: 2024-06-18
Attending: STUDENT IN AN ORGANIZED HEALTH CARE EDUCATION/TRAINING PROGRAM | Admitting: INTERNAL MEDICINE
Payer: MEDICARE

## 2024-06-16 ENCOUNTER — APPOINTMENT (OUTPATIENT)
Dept: GENERAL RADIOLOGY | Age: 64
DRG: 920 | End: 2024-06-16
Payer: MEDICARE

## 2024-06-16 DIAGNOSIS — R07.9 CHEST PAIN, UNSPECIFIED TYPE: Primary | ICD-10-CM

## 2024-06-16 DIAGNOSIS — F19.939 WITHDRAWAL FROM OTHER PSYCHOACTIVE SUBSTANCE (HCC): ICD-10-CM

## 2024-06-16 LAB
BASOPHILS # BLD: 0 K/UL (ref 0–0.2)
BASOPHILS NFR BLD: 0.2 %
DEPRECATED RDW RBC AUTO: 14.6 % (ref 12.4–15.4)
EOSINOPHIL # BLD: 0 K/UL (ref 0–0.6)
EOSINOPHIL NFR BLD: 0.1 %
HCT VFR BLD AUTO: 47.2 % (ref 40.5–52.5)
HGB BLD-MCNC: 16 G/DL (ref 13.5–17.5)
LYMPHOCYTES # BLD: 1.9 K/UL (ref 1–5.1)
LYMPHOCYTES NFR BLD: 14.3 %
MCH RBC QN AUTO: 28.8 PG (ref 26–34)
MCHC RBC AUTO-ENTMCNC: 33.8 G/DL (ref 31–36)
MCV RBC AUTO: 85.2 FL (ref 80–100)
MONOCYTES # BLD: 0.7 K/UL (ref 0–1.3)
MONOCYTES NFR BLD: 5.2 %
NEUTROPHILS # BLD: 10.5 K/UL (ref 1.7–7.7)
NEUTROPHILS NFR BLD: 80.2 %
PLATELET # BLD AUTO: 208 K/UL (ref 135–450)
PMV BLD AUTO: 8.8 FL (ref 5–10.5)
RBC # BLD AUTO: 5.54 M/UL (ref 4.2–5.9)
WBC # BLD AUTO: 13 K/UL (ref 4–11)

## 2024-06-16 PROCEDURE — 71046 X-RAY EXAM CHEST 2 VIEWS: CPT

## 2024-06-16 PROCEDURE — 96374 THER/PROPH/DIAG INJ IV PUSH: CPT

## 2024-06-16 PROCEDURE — 80053 COMPREHEN METABOLIC PANEL: CPT

## 2024-06-16 PROCEDURE — 99285 EMERGENCY DEPT VISIT HI MDM: CPT

## 2024-06-16 PROCEDURE — 93005 ELECTROCARDIOGRAM TRACING: CPT | Performed by: STUDENT IN AN ORGANIZED HEALTH CARE EDUCATION/TRAINING PROGRAM

## 2024-06-16 PROCEDURE — 80307 DRUG TEST PRSMV CHEM ANLYZR: CPT

## 2024-06-16 PROCEDURE — 6370000000 HC RX 637 (ALT 250 FOR IP): Performed by: PHYSICIAN ASSISTANT

## 2024-06-16 PROCEDURE — 85025 COMPLETE CBC W/AUTO DIFF WBC: CPT

## 2024-06-16 PROCEDURE — 96375 TX/PRO/DX INJ NEW DRUG ADDON: CPT

## 2024-06-16 PROCEDURE — 83735 ASSAY OF MAGNESIUM: CPT

## 2024-06-16 PROCEDURE — 2580000003 HC RX 258: Performed by: PHYSICIAN ASSISTANT

## 2024-06-16 PROCEDURE — 84484 ASSAY OF TROPONIN QUANT: CPT

## 2024-06-16 RX ORDER — LORAZEPAM 1 MG/1
4 TABLET ORAL
Status: DISCONTINUED | OUTPATIENT
Start: 2024-06-16 | End: 2024-06-18 | Stop reason: HOSPADM

## 2024-06-16 RX ORDER — LORAZEPAM 1 MG/1
3 TABLET ORAL
Status: DISCONTINUED | OUTPATIENT
Start: 2024-06-16 | End: 2024-06-18 | Stop reason: HOSPADM

## 2024-06-16 RX ORDER — LORAZEPAM 2 MG/ML
1 INJECTION INTRAMUSCULAR
Status: DISCONTINUED | OUTPATIENT
Start: 2024-06-16 | End: 2024-06-18 | Stop reason: HOSPADM

## 2024-06-16 RX ORDER — LORAZEPAM 2 MG/ML
4 INJECTION INTRAMUSCULAR
Status: DISCONTINUED | OUTPATIENT
Start: 2024-06-16 | End: 2024-06-18 | Stop reason: HOSPADM

## 2024-06-16 RX ORDER — ONDANSETRON 2 MG/ML
4 INJECTION INTRAMUSCULAR; INTRAVENOUS ONCE
Status: COMPLETED | OUTPATIENT
Start: 2024-06-17 | End: 2024-06-17

## 2024-06-16 RX ORDER — SODIUM CHLORIDE 9 MG/ML
INJECTION, SOLUTION INTRAVENOUS PRN
Status: DISCONTINUED | OUTPATIENT
Start: 2024-06-16 | End: 2024-06-18 | Stop reason: HOSPADM

## 2024-06-16 RX ORDER — LORAZEPAM 1 MG/1
1 TABLET ORAL
Status: DISCONTINUED | OUTPATIENT
Start: 2024-06-16 | End: 2024-06-18 | Stop reason: HOSPADM

## 2024-06-16 RX ORDER — LORAZEPAM 1 MG/1
2 TABLET ORAL
Status: DISCONTINUED | OUTPATIENT
Start: 2024-06-16 | End: 2024-06-18 | Stop reason: HOSPADM

## 2024-06-16 RX ORDER — SODIUM CHLORIDE 0.9 % (FLUSH) 0.9 %
5-40 SYRINGE (ML) INJECTION EVERY 12 HOURS SCHEDULED
Status: DISCONTINUED | OUTPATIENT
Start: 2024-06-16 | End: 2024-06-18 | Stop reason: HOSPADM

## 2024-06-16 RX ORDER — LORAZEPAM 2 MG/ML
3 INJECTION INTRAMUSCULAR
Status: DISCONTINUED | OUTPATIENT
Start: 2024-06-16 | End: 2024-06-18 | Stop reason: HOSPADM

## 2024-06-16 RX ORDER — 0.9 % SODIUM CHLORIDE 0.9 %
1000 INTRAVENOUS SOLUTION INTRAVENOUS ONCE
Status: COMPLETED | OUTPATIENT
Start: 2024-06-16 | End: 2024-06-17

## 2024-06-16 RX ORDER — LANOLIN ALCOHOL/MO/W.PET/CERES
100 CREAM (GRAM) TOPICAL DAILY
Status: DISCONTINUED | OUTPATIENT
Start: 2024-06-17 | End: 2024-06-18 | Stop reason: HOSPADM

## 2024-06-16 RX ORDER — SODIUM CHLORIDE 0.9 % (FLUSH) 0.9 %
5-40 SYRINGE (ML) INJECTION PRN
Status: DISCONTINUED | OUTPATIENT
Start: 2024-06-16 | End: 2024-06-18 | Stop reason: HOSPADM

## 2024-06-16 RX ORDER — LORAZEPAM 2 MG/ML
2 INJECTION INTRAMUSCULAR
Status: DISCONTINUED | OUTPATIENT
Start: 2024-06-16 | End: 2024-06-18 | Stop reason: HOSPADM

## 2024-06-16 RX ADMIN — LORAZEPAM 2 MG: 1 TABLET ORAL at 23:52

## 2024-06-16 RX ADMIN — SODIUM CHLORIDE, PRESERVATIVE FREE 10 ML: 5 INJECTION INTRAVENOUS at 23:32

## 2024-06-16 RX ADMIN — SODIUM CHLORIDE 1000 ML: 9 INJECTION, SOLUTION INTRAVENOUS at 23:51

## 2024-06-16 ASSESSMENT — LIFESTYLE VARIABLES
HOW MANY STANDARD DRINKS CONTAINING ALCOHOL DO YOU HAVE ON A TYPICAL DAY: PATIENT DOES NOT DRINK
HOW OFTEN DO YOU HAVE A DRINK CONTAINING ALCOHOL: NEVER

## 2024-06-16 ASSESSMENT — PAIN SCALES - GENERAL: PAINLEVEL_OUTOF10: 10

## 2024-06-16 ASSESSMENT — PAIN DESCRIPTION - LOCATION: LOCATION: CHEST

## 2024-06-17 ENCOUNTER — APPOINTMENT (OUTPATIENT)
Age: 64
DRG: 920 | End: 2024-06-17
Payer: MEDICARE

## 2024-06-17 ENCOUNTER — APPOINTMENT (OUTPATIENT)
Dept: NUCLEAR MEDICINE | Age: 64
DRG: 920 | End: 2024-06-17
Payer: MEDICARE

## 2024-06-17 PROBLEM — R07.9 CHEST PAIN: Status: ACTIVE | Noted: 2024-06-17

## 2024-06-17 LAB
ALBUMIN SERPL-MCNC: 3.6 G/DL (ref 3.4–5)
ALBUMIN SERPL-MCNC: 4.1 G/DL (ref 3.4–5)
ALBUMIN/GLOB SERPL: 1.4 {RATIO} (ref 1.1–2.2)
ALBUMIN/GLOB SERPL: 1.5 {RATIO} (ref 1.1–2.2)
ALP SERPL-CCNC: 70 U/L (ref 40–129)
ALP SERPL-CCNC: 79 U/L (ref 40–129)
ALT SERPL-CCNC: 10 U/L (ref 10–40)
ALT SERPL-CCNC: 11 U/L (ref 10–40)
AMPHETAMINES UR QL SCN>1000 NG/ML: ABNORMAL
ANION GAP SERPL CALCULATED.3IONS-SCNC: 14 MMOL/L (ref 3–16)
ANION GAP SERPL CALCULATED.3IONS-SCNC: 9 MMOL/L (ref 3–16)
AST SERPL-CCNC: 10 U/L (ref 15–37)
AST SERPL-CCNC: 13 U/L (ref 15–37)
BARBITURATES UR QL SCN>200 NG/ML: ABNORMAL
BASOPHILS # BLD: 0 K/UL (ref 0–0.2)
BASOPHILS NFR BLD: 0.4 %
BENZODIAZ UR QL SCN>200 NG/ML: POSITIVE
BILIRUB SERPL-MCNC: 0.4 MG/DL (ref 0–1)
BILIRUB SERPL-MCNC: 0.6 MG/DL (ref 0–1)
BUN SERPL-MCNC: 12 MG/DL (ref 7–20)
BUN SERPL-MCNC: 14 MG/DL (ref 7–20)
CALCIUM SERPL-MCNC: 8.6 MG/DL (ref 8.3–10.6)
CALCIUM SERPL-MCNC: 8.9 MG/DL (ref 8.3–10.6)
CANNABINOIDS UR QL SCN>50 NG/ML: ABNORMAL
CHLORIDE SERPL-SCNC: 101 MMOL/L (ref 99–110)
CHLORIDE SERPL-SCNC: 98 MMOL/L (ref 99–110)
CO2 SERPL-SCNC: 25 MMOL/L (ref 21–32)
CO2 SERPL-SCNC: 28 MMOL/L (ref 21–32)
COCAINE UR QL SCN: ABNORMAL
CREAT SERPL-MCNC: 0.7 MG/DL (ref 0.8–1.3)
CREAT SERPL-MCNC: 0.7 MG/DL (ref 0.8–1.3)
DEPRECATED RDW RBC AUTO: 14.8 % (ref 12.4–15.4)
DRUG SCREEN COMMENT UR-IMP: ABNORMAL
ECHO BSA: 2.3 M2
EKG ATRIAL RATE: 72 BPM
EKG DIAGNOSIS: NORMAL
EKG P AXIS: 31 DEGREES
EKG P-R INTERVAL: 142 MS
EKG Q-T INTERVAL: 436 MS
EKG QRS DURATION: 152 MS
EKG QTC CALCULATION (BAZETT): 477 MS
EKG R AXIS: 22 DEGREES
EKG T AXIS: 14 DEGREES
EKG VENTRICULAR RATE: 72 BPM
EOSINOPHIL # BLD: 0.1 K/UL (ref 0–0.6)
EOSINOPHIL NFR BLD: 0.7 %
FENTANYL SCREEN, URINE: ABNORMAL
GFR SERPLBLD CREATININE-BSD FMLA CKD-EPI: >90 ML/MIN/{1.73_M2}
GFR SERPLBLD CREATININE-BSD FMLA CKD-EPI: >90 ML/MIN/{1.73_M2}
GLUCOSE SERPL-MCNC: 116 MG/DL (ref 70–99)
GLUCOSE SERPL-MCNC: 97 MG/DL (ref 70–99)
HCT VFR BLD AUTO: 43.3 % (ref 40.5–52.5)
HGB BLD-MCNC: 14.3 G/DL (ref 13.5–17.5)
LYMPHOCYTES # BLD: 2.4 K/UL (ref 1–5.1)
LYMPHOCYTES NFR BLD: 25.3 %
MAGNESIUM SERPL-MCNC: 2 MG/DL (ref 1.8–2.4)
MCH RBC QN AUTO: 28 PG (ref 26–34)
MCHC RBC AUTO-ENTMCNC: 33 G/DL (ref 31–36)
MCV RBC AUTO: 84.9 FL (ref 80–100)
METHADONE UR QL SCN>300 NG/ML: POSITIVE
MONOCYTES # BLD: 0.7 K/UL (ref 0–1.3)
MONOCYTES NFR BLD: 7.6 %
NEUTROPHILS # BLD: 6.4 K/UL (ref 1.7–7.7)
NEUTROPHILS NFR BLD: 66 %
NUC STRESS EJECTION FRACTION: 62 %
NUC STRESS LV EDV: 114 ML (ref 67–155)
NUC STRESS LV ESV: 43 ML (ref 22–58)
NUC STRESS LV MASS: 141 G
OPIATES UR QL SCN>300 NG/ML: ABNORMAL
OXYCODONE UR QL SCN: ABNORMAL
PCP UR QL SCN>25 NG/ML: ABNORMAL
PH UR STRIP: 6 [PH]
PLATELET # BLD AUTO: 184 K/UL (ref 135–450)
PMV BLD AUTO: 8.1 FL (ref 5–10.5)
POTASSIUM SERPL-SCNC: 3.5 MMOL/L (ref 3.5–5.1)
POTASSIUM SERPL-SCNC: 3.9 MMOL/L (ref 3.5–5.1)
PROCALCITONIN SERPL IA-MCNC: 0.05 NG/ML (ref 0–0.15)
PROT SERPL-MCNC: 6.1 G/DL (ref 6.4–8.2)
PROT SERPL-MCNC: 6.8 G/DL (ref 6.4–8.2)
RBC # BLD AUTO: 5.1 M/UL (ref 4.2–5.9)
SODIUM SERPL-SCNC: 137 MMOL/L (ref 136–145)
SODIUM SERPL-SCNC: 138 MMOL/L (ref 136–145)
STRESS BASELINE DIAS BP: 88 MMHG
STRESS BASELINE HR: 61 BPM
STRESS BASELINE SYS BP: 150 MMHG
STRESS ESTIMATED WORKLOAD: 1 METS
STRESS PEAK DIAS BP: 99 MMHG
STRESS PEAK SYS BP: 180 MMHG
STRESS PERCENT HR ACHIEVED: 69 %
STRESS POST PEAK HR: 108 BPM
STRESS RATE PRESSURE PRODUCT: NORMAL BPM*MMHG
STRESS STAGE 1 BP: NORMAL MMHG
STRESS STAGE 1 DURATION: NORMAL MIN:SEC
STRESS STAGE 1 HR: 67 BPM
STRESS STAGE RECOVERY 1 BP: NORMAL MMHG
STRESS STAGE RECOVERY 1 DURATION: NORMAL MIN:SEC
STRESS STAGE RECOVERY 1 HR: 95 BPM
STRESS STAGE RECOVERY 2 BP: NORMAL MMHG
STRESS STAGE RECOVERY 2 DURATION: NORMAL MIN:SEC
STRESS STAGE RECOVERY 2 HR: 88 BPM
STRESS STAGE RECOVERY 3 BP: NORMAL MMHG
STRESS STAGE RECOVERY 3 DURATION: NORMAL MIN:SEC
STRESS STAGE RECOVERY 3 HR: 84 BPM
STRESS STAGE RECOVERY 4 BP: NORMAL MMHG
STRESS STAGE RECOVERY 4 DURATION: NORMAL MIN:SEC
STRESS TARGET HR: 157 BPM
TID: 0.91
TROPONIN, HIGH SENSITIVITY: 8 NG/L (ref 0–22)
TROPONIN, HIGH SENSITIVITY: 9 NG/L (ref 0–22)
TROPONIN, HIGH SENSITIVITY: 9 NG/L (ref 0–22)
WBC # BLD AUTO: 9.6 K/UL (ref 4–11)

## 2024-06-17 PROCEDURE — 6370000000 HC RX 637 (ALT 250 FOR IP): Performed by: INTERNAL MEDICINE

## 2024-06-17 PROCEDURE — 96361 HYDRATE IV INFUSION ADD-ON: CPT

## 2024-06-17 PROCEDURE — A9502 TC99M TETROFOSMIN: HCPCS | Performed by: INTERNAL MEDICINE

## 2024-06-17 PROCEDURE — 6360000002 HC RX W HCPCS: Performed by: INTERNAL MEDICINE

## 2024-06-17 PROCEDURE — 85025 COMPLETE CBC W/AUTO DIFF WBC: CPT

## 2024-06-17 PROCEDURE — 78452 HT MUSCLE IMAGE SPECT MULT: CPT

## 2024-06-17 PROCEDURE — 93017 CV STRESS TEST TRACING ONLY: CPT

## 2024-06-17 PROCEDURE — 6370000000 HC RX 637 (ALT 250 FOR IP): Performed by: NURSE PRACTITIONER

## 2024-06-17 PROCEDURE — 96374 THER/PROPH/DIAG INJ IV PUSH: CPT

## 2024-06-17 PROCEDURE — 93010 ELECTROCARDIOGRAM REPORT: CPT | Performed by: INTERNAL MEDICINE

## 2024-06-17 PROCEDURE — 87040 BLOOD CULTURE FOR BACTERIA: CPT

## 2024-06-17 PROCEDURE — 3430000000 HC RX DIAGNOSTIC RADIOPHARMACEUTICAL: Performed by: INTERNAL MEDICINE

## 2024-06-17 PROCEDURE — G0378 HOSPITAL OBSERVATION PER HR: HCPCS

## 2024-06-17 PROCEDURE — 99222 1ST HOSP IP/OBS MODERATE 55: CPT | Performed by: INTERNAL MEDICINE

## 2024-06-17 PROCEDURE — 78452 HT MUSCLE IMAGE SPECT MULT: CPT | Performed by: INTERNAL MEDICINE

## 2024-06-17 PROCEDURE — 6370000000 HC RX 637 (ALT 250 FOR IP): Performed by: PHYSICIAN ASSISTANT

## 2024-06-17 PROCEDURE — 96375 TX/PRO/DX INJ NEW DRUG ADDON: CPT

## 2024-06-17 PROCEDURE — 93018 CV STRESS TEST I&R ONLY: CPT | Performed by: INTERNAL MEDICINE

## 2024-06-17 PROCEDURE — 84484 ASSAY OF TROPONIN QUANT: CPT

## 2024-06-17 PROCEDURE — 6360000002 HC RX W HCPCS: Performed by: PHYSICIAN ASSISTANT

## 2024-06-17 PROCEDURE — 93016 CV STRESS TEST SUPVJ ONLY: CPT | Performed by: INTERNAL MEDICINE

## 2024-06-17 PROCEDURE — 80053 COMPREHEN METABOLIC PANEL: CPT

## 2024-06-17 PROCEDURE — 84145 PROCALCITONIN (PCT): CPT

## 2024-06-17 PROCEDURE — 2580000003 HC RX 258: Performed by: PHYSICIAN ASSISTANT

## 2024-06-17 PROCEDURE — 36415 COLL VENOUS BLD VENIPUNCTURE: CPT

## 2024-06-17 PROCEDURE — 2580000003 HC RX 258: Performed by: INTERNAL MEDICINE

## 2024-06-17 PROCEDURE — 96372 THER/PROPH/DIAG INJ SC/IM: CPT

## 2024-06-17 PROCEDURE — 97165 OT EVAL LOW COMPLEX 30 MIN: CPT

## 2024-06-17 PROCEDURE — 97530 THERAPEUTIC ACTIVITIES: CPT

## 2024-06-17 RX ORDER — ASPIRIN 81 MG/1
81 TABLET, CHEWABLE ORAL DAILY
Status: DISCONTINUED | OUTPATIENT
Start: 2024-06-17 | End: 2024-06-18 | Stop reason: HOSPADM

## 2024-06-17 RX ORDER — ACETAMINOPHEN 325 MG/1
650 TABLET ORAL EVERY 6 HOURS PRN
Status: DISCONTINUED | OUTPATIENT
Start: 2024-06-17 | End: 2024-06-18 | Stop reason: HOSPADM

## 2024-06-17 RX ORDER — REGADENOSON 0.08 MG/ML
0.4 INJECTION, SOLUTION INTRAVENOUS
Status: COMPLETED | OUTPATIENT
Start: 2024-06-17 | End: 2024-06-17

## 2024-06-17 RX ORDER — POTASSIUM CHLORIDE 7.45 MG/ML
10 INJECTION INTRAVENOUS PRN
Status: DISCONTINUED | OUTPATIENT
Start: 2024-06-17 | End: 2024-06-18 | Stop reason: HOSPADM

## 2024-06-17 RX ORDER — ENOXAPARIN SODIUM 100 MG/ML
30 INJECTION SUBCUTANEOUS 2 TIMES DAILY
Status: DISCONTINUED | OUTPATIENT
Start: 2024-06-17 | End: 2024-06-18 | Stop reason: HOSPADM

## 2024-06-17 RX ORDER — ALPRAZOLAM 1 MG/1
1 TABLET ORAL 3 TIMES DAILY PRN
Status: DISCONTINUED | OUTPATIENT
Start: 2024-06-17 | End: 2024-06-18 | Stop reason: HOSPADM

## 2024-06-17 RX ORDER — SODIUM CHLORIDE 9 MG/ML
INJECTION, SOLUTION INTRAVENOUS PRN
Status: DISCONTINUED | OUTPATIENT
Start: 2024-06-17 | End: 2024-06-18 | Stop reason: HOSPADM

## 2024-06-17 RX ORDER — MAGNESIUM SULFATE IN WATER 40 MG/ML
2000 INJECTION, SOLUTION INTRAVENOUS PRN
Status: DISCONTINUED | OUTPATIENT
Start: 2024-06-17 | End: 2024-06-18 | Stop reason: HOSPADM

## 2024-06-17 RX ORDER — PROMETHAZINE HYDROCHLORIDE 25 MG/1
12.5 TABLET ORAL EVERY 6 HOURS PRN
Status: DISCONTINUED | OUTPATIENT
Start: 2024-06-17 | End: 2024-06-18 | Stop reason: HOSPADM

## 2024-06-17 RX ORDER — SODIUM CHLORIDE 0.9 % (FLUSH) 0.9 %
10 SYRINGE (ML) INJECTION EVERY 12 HOURS SCHEDULED
Status: DISCONTINUED | OUTPATIENT
Start: 2024-06-17 | End: 2024-06-18 | Stop reason: HOSPADM

## 2024-06-17 RX ORDER — SODIUM CHLORIDE 9 MG/ML
INJECTION, SOLUTION INTRAVENOUS CONTINUOUS
Status: DISCONTINUED | OUTPATIENT
Start: 2024-06-17 | End: 2024-06-17

## 2024-06-17 RX ORDER — ACETAMINOPHEN 650 MG/1
650 SUPPOSITORY RECTAL EVERY 6 HOURS PRN
Status: DISCONTINUED | OUTPATIENT
Start: 2024-06-17 | End: 2024-06-18 | Stop reason: HOSPADM

## 2024-06-17 RX ORDER — ALPRAZOLAM 1 MG/1
2 TABLET ORAL 3 TIMES DAILY PRN
Status: DISCONTINUED | OUTPATIENT
Start: 2024-06-17 | End: 2024-06-17

## 2024-06-17 RX ORDER — SODIUM CHLORIDE 0.9 % (FLUSH) 0.9 %
10 SYRINGE (ML) INJECTION PRN
Status: DISCONTINUED | OUTPATIENT
Start: 2024-06-17 | End: 2024-06-18 | Stop reason: HOSPADM

## 2024-06-17 RX ORDER — ONDANSETRON 2 MG/ML
4 INJECTION INTRAMUSCULAR; INTRAVENOUS EVERY 6 HOURS PRN
Status: DISCONTINUED | OUTPATIENT
Start: 2024-06-17 | End: 2024-06-18 | Stop reason: HOSPADM

## 2024-06-17 RX ORDER — METHADONE HYDROCHLORIDE 5 MG/1
5 TABLET ORAL EVERY 4 HOURS PRN
Status: DISCONTINUED | OUTPATIENT
Start: 2024-06-17 | End: 2024-06-18 | Stop reason: HOSPADM

## 2024-06-17 RX ORDER — LANOLIN ALCOHOL/MO/W.PET/CERES
3 CREAM (GRAM) TOPICAL NIGHTLY
Status: DISCONTINUED | OUTPATIENT
Start: 2024-06-17 | End: 2024-06-18 | Stop reason: HOSPADM

## 2024-06-17 RX ORDER — POTASSIUM CHLORIDE 20 MEQ/1
40 TABLET, EXTENDED RELEASE ORAL PRN
Status: DISCONTINUED | OUTPATIENT
Start: 2024-06-17 | End: 2024-06-18 | Stop reason: HOSPADM

## 2024-06-17 RX ORDER — METOPROLOL SUCCINATE 25 MG/1
25 TABLET, EXTENDED RELEASE ORAL DAILY
Status: DISCONTINUED | OUTPATIENT
Start: 2024-06-17 | End: 2024-06-18

## 2024-06-17 RX ORDER — NICOTINE 21 MG/24HR
1 PATCH, TRANSDERMAL 24 HOURS TRANSDERMAL DAILY
Status: DISCONTINUED | OUTPATIENT
Start: 2024-06-17 | End: 2024-06-18 | Stop reason: HOSPADM

## 2024-06-17 RX ORDER — ATORVASTATIN CALCIUM 10 MG/1
20 TABLET, FILM COATED ORAL NIGHTLY
Status: DISCONTINUED | OUTPATIENT
Start: 2024-06-17 | End: 2024-06-18 | Stop reason: HOSPADM

## 2024-06-17 RX ORDER — KETOROLAC TROMETHAMINE 30 MG/ML
30 INJECTION, SOLUTION INTRAMUSCULAR; INTRAVENOUS ONCE
Status: COMPLETED | OUTPATIENT
Start: 2024-06-17 | End: 2024-06-17

## 2024-06-17 RX ADMIN — ALPRAZOLAM 2 MG: 1 TABLET ORAL at 07:56

## 2024-06-17 RX ADMIN — ASPIRIN 81 MG 81 MG: 81 TABLET ORAL at 12:32

## 2024-06-17 RX ADMIN — TETROFOSMIN 11.1 MILLICURIE: 1.38 INJECTION, POWDER, LYOPHILIZED, FOR SOLUTION INTRAVENOUS at 09:54

## 2024-06-17 RX ADMIN — SODIUM CHLORIDE, PRESERVATIVE FREE 10 ML: 5 INJECTION INTRAVENOUS at 07:56

## 2024-06-17 RX ADMIN — ATORVASTATIN CALCIUM 20 MG: 10 TABLET, FILM COATED ORAL at 20:52

## 2024-06-17 RX ADMIN — METHADONE HYDROCHLORIDE 5 MG: 5 TABLET ORAL at 07:56

## 2024-06-17 RX ADMIN — SODIUM CHLORIDE, PRESERVATIVE FREE 10 ML: 5 INJECTION INTRAVENOUS at 20:52

## 2024-06-17 RX ADMIN — METOPROLOL SUCCINATE 25 MG: 25 TABLET, EXTENDED RELEASE ORAL at 17:50

## 2024-06-17 RX ADMIN — METHADONE HYDROCHLORIDE 5 MG: 5 TABLET ORAL at 17:52

## 2024-06-17 RX ADMIN — ALPRAZOLAM 1 MG: 1 TABLET ORAL at 20:59

## 2024-06-17 RX ADMIN — LORAZEPAM 2 MG: 1 TABLET ORAL at 01:47

## 2024-06-17 RX ADMIN — LORAZEPAM 1 MG: 1 TABLET ORAL at 00:40

## 2024-06-17 RX ADMIN — Medication 100 MG: at 12:32

## 2024-06-17 RX ADMIN — TETROFOSMIN 33.6 MILLICURIE: 1.38 INJECTION, POWDER, LYOPHILIZED, FOR SOLUTION INTRAVENOUS at 11:03

## 2024-06-17 RX ADMIN — METHADONE HYDROCHLORIDE 5 MG: 5 TABLET ORAL at 12:30

## 2024-06-17 RX ADMIN — SODIUM CHLORIDE: 9 INJECTION, SOLUTION INTRAVENOUS at 06:02

## 2024-06-17 RX ADMIN — ENOXAPARIN SODIUM 30 MG: 100 INJECTION SUBCUTANEOUS at 20:52

## 2024-06-17 RX ADMIN — ONDANSETRON 4 MG: 2 INJECTION INTRAMUSCULAR; INTRAVENOUS at 00:15

## 2024-06-17 RX ADMIN — KETOROLAC TROMETHAMINE 30 MG: 30 INJECTION, SOLUTION INTRAMUSCULAR at 00:45

## 2024-06-17 RX ADMIN — ALPRAZOLAM 1 MG: 1 TABLET ORAL at 12:32

## 2024-06-17 RX ADMIN — ENOXAPARIN SODIUM 30 MG: 100 INJECTION SUBCUTANEOUS at 12:33

## 2024-06-17 RX ADMIN — ACETAMINOPHEN 650 MG: 325 TABLET ORAL at 12:30

## 2024-06-17 RX ADMIN — Medication 3 MG: at 20:52

## 2024-06-17 RX ADMIN — REGADENOSON 0.4 MG: 0.08 INJECTION, SOLUTION INTRAVENOUS at 11:04

## 2024-06-17 ASSESSMENT — PAIN SCALES - GENERAL
PAINLEVEL_OUTOF10: 4
PAINLEVEL_OUTOF10: 3
PAINLEVEL_OUTOF10: 9
PAINLEVEL_OUTOF10: 9
PAINLEVEL_OUTOF10: 2
PAINLEVEL_OUTOF10: 0
PAINLEVEL_OUTOF10: 0
PAINLEVEL_OUTOF10: 8
PAINLEVEL_OUTOF10: 9

## 2024-06-17 ASSESSMENT — PAIN - FUNCTIONAL ASSESSMENT
PAIN_FUNCTIONAL_ASSESSMENT: PREVENTS OR INTERFERES SOME ACTIVE ACTIVITIES AND ADLS
PAIN_FUNCTIONAL_ASSESSMENT: ACTIVITIES ARE NOT PREVENTED

## 2024-06-17 ASSESSMENT — PAIN DESCRIPTION - LOCATION
LOCATION: HEAD
LOCATION: BACK
LOCATION: HEAD
LOCATION: BACK

## 2024-06-17 ASSESSMENT — PAIN DESCRIPTION - PAIN TYPE
TYPE: CHRONIC PAIN
TYPE: CHRONIC PAIN

## 2024-06-17 ASSESSMENT — PAIN DESCRIPTION - DESCRIPTORS
DESCRIPTORS: ACHING

## 2024-06-17 ASSESSMENT — PAIN DESCRIPTION - ORIENTATION
ORIENTATION: MID

## 2024-06-17 ASSESSMENT — HEART SCORE: ECG: NORMAL

## 2024-06-17 NOTE — ED NOTES
Patient slow to answer questions, does not seem like pt is aware of how to answer questions appropriately. Pt oriented to self, time, place, situation.

## 2024-06-17 NOTE — ED NOTES
Pt seems to be communicating better. Reports of back pain, states he is unable to get comfortable in bed.

## 2024-06-17 NOTE — PROGRESS NOTES
Hospital Medicine Progress Note      Date of Admission: 6/16/2024  Hospital Day: 2    Chief Admission Complaint:      Chest Pain and Palpitations (Left sided chest pain & palpitations since this morning accompanied with nausea.)      Subjective:      Patient lying in bed after stress test. He states he has a terrible headache. He complains of continued left shoulder, left arm, and left upper chest pain. He is s/p Proximal Bicep repair in April 2023. Pain is reproducible on palpitation. However stress test positive for moderate risk cardiac event. Cardiology consulted.      Presenting Admission History:       This is a 63 y.o. male who presented to [facility] with past medical history of asthma, arthritis, hypertension, sleep apnea noncompliant with CPAP presented to ED due to chest pain and anxiety     Patient reported that he is prescribed Xanax for anxiety reports that he pulled his prescription and then feels really bad that he actually discarded the prescription accidentally after it was immersed in liquid milk.  Patient reported that he has not had his dose for the past 2 days and has very severe anxiety and chest pain that is substernal no alleviating factor, exacerbated with anxiety and exertion.  No associate with fever chills shortness of breath abdominal pain or dysuria.  Patient reported he had stress test previously.    Assessment/Plan:      Current Principal Problem:  Chest pain    Anxiety  Benzo withdrawal  - Patient states he takes Xanax at home but threw away his prescription when it got wet from milk  - Restart Xanax but will decrease dose to 1 mg TID PRN   - CM asked to see patient   - Drug screen positive for Benzo & Methadone    Chest pain, possibly anginal  - Stress test revealed the study is most consistent with artifact but cannot rule out a small degree of myocardial ischemia. Findings suggest a moderate risk of cardiac events.   - Cardiology consulted; Appreciate their input  -  0751)    sodium chloride      sodium chloride       Scheduled Medications    sodium chloride flush  10 mL IntraVENous 2 times per day    enoxaparin  30 mg SubCUTAneous BID    melatonin  3 mg Oral Nightly    nicotine  1 patch TransDERmal Daily    sodium chloride flush  5-40 mL IntraVENous 2 times per day    thiamine  100 mg Oral Daily     PRN Meds: ALPRAZolam, methadone, sodium chloride flush, sodium chloride, potassium chloride **OR** potassium alternative oral replacement **OR** potassium chloride, magnesium sulfate, promethazine **OR** ondansetron, acetaminophen **OR** acetaminophen, sodium chloride flush, sodium chloride, LORazepam **OR** LORazepam **OR** LORazepam **OR** LORazepam **OR** LORazepam **OR** LORazepam **OR** LORazepam **OR** LORazepam     Labs:  Personally reviewed and interpreted for clinical significance.     Recent Labs     06/16/24 2343 06/17/24 0627   WBC 13.0* 9.6   HGB 16.0 14.3   HCT 47.2 43.3    184     Recent Labs     06/16/24 2343 06/17/24 0627    138   K 3.5 3.9   CL 98* 101   CO2 25 28   BUN 12 14   CREATININE 0.7* 0.7*   CALCIUM 8.9 8.6   MG 2.00  --      Recent Labs     06/16/24 2343 06/17/24  0036 06/17/24 0627   TROPHS 9 8 9     No results for input(s): \"LABA1C\" in the last 72 hours.  Recent Labs     06/16/24 2343 06/17/24 0627   AST 13* 10*   ALT 11 10   BILITOT 0.6 0.4   ALKPHOS 79 70     No results for input(s): \"INR\", \"LACTA\", \"TSH\" in the last 72 hours.    Urine Cultures:   Lab Results   Component Value Date/Time    LABURIN No growth at 18 to 36 hours 05/20/2022 04:12 PM     Blood Cultures:   Lab Results   Component Value Date/Time    BC No growth after 5 days of incubation. 08/28/2016 02:25 PM     Lab Results   Component Value Date/Time    BLOODCULT2 No growth after 5 days of incubation. 08/28/2016 02:35 PM     Organism: No results found for: \"ORG\"      Lashay Campbell, APRN - CNP

## 2024-06-17 NOTE — PROGRESS NOTES
Occupational Therapy  Facility/Department: [place] [address] - MED SURG/ORTHO  Occupational Therapy Initial Assessment/Discharge Summary    Name: Sudhir Kwon  : 1960  MRN: [id number]  Date of Service: 2024    Discharge Recommendations:  Home with assist PRN          Patient Diagnosis(es): The primary encounter diagnosis was Chest pain, unspecified type. A diagnosis of Withdrawal from other psychoactive substance (HCC) was also pertinent to this visit.  Past Medical History:  has a past medical history of Arthritis, Asthma, Atelectasis, Bilateral low back pain with sciatica, Depression, Drug overdose, Enlarged prostate, Fractured pelvis (HCC), Fractured rib, Hypertension, Irregular heartbeat, Lumbar degenerative disc disease, Methadone use, Other chest pain, Pneumonia, Pneumothorax, left, Polyp of transverse colon, RBBB, Sleep apnea, and Urinary incontinence.  Past Surgical History:  has a past surgical history that includes laminectomy; Lumbar spine surgery; Cardiac surgery; lumbar fusion; fracture surgery (Left); Colonoscopy (N/A, 2019); Knee Arthroplasty (Left); and Cystoscopy (N/A, 2022).           Assessment    Assessment: Pt admitted w/ chest pain. Independent w/ ADLs and functional transfers/mobility at baseline. During eval this date, pt independent w/ ADLs and functional mobility. Recommend D/C home w/ assist as needed. Anticipate not further OT, other than current OP OT pt was completing for L shoulder.  No Skilled OT: Independent with functional mobility;Independent with ADL's;At baseline function  REQUIRES OT FOLLOW-UP: No  Activity Tolerance  Activity Tolerance: Patient Tolerated treatment well          Restrictions  Restrictions/Precautions  Restrictions/Precautions: General Precautions, Seizure  Required Braces or Orthoses?: No  Position Activity Restriction  Other position/activity restrictions: IV    Subjective   General  Chart Reviewed: Yes  Patient assessed for rehabilitation  seated EOB  Toileting: Independent  Functional Mobility: Independent  Functional Mobility Skilled Clinical Factors: w/o AD in room              Vision  Vision: Impaired  Vision Exceptions: Wears glasses for reading  Hearing  Hearing: Within functional limits  Cognition  Overall Cognitive Status: WFL  Orientation  Overall Orientation Status: Within Normal Limits  Orientation Level: Oriented X4                  Education Given To: Patient  Education Provided: Role of Therapy;Transfer Training;Plan of Care;Precautions;Orientation  Education Method: Demonstration;Verbal  Barriers to Learning: None  Education Outcome: Verbalized understanding;Demonstrated understanding  LUE AROM (degrees)  LUE General AROM: not fully assessed d/t pt reporting h/o of shoulder surgery and unsure the date  RUE AROM (degrees)  RUE AROM : WFL                     Short term goals:  Goal: Pt will be mod I w/ LB ADLs - goal met 6/17    AM-PAC - ADL  AM-PAC Daily Activity - Inpatient   How much help is needed for putting on and taking off regular lower body clothing?: None  How much help is needed for bathing (which includes washing, rinsing, drying)?: None  How much help is needed for toileting (which includes using toilet, bedpan, or urinal)?: None  How much help is needed for putting on and taking off regular upper body clothing?: None  How much help is needed for taking care of personal grooming?: None  How much help for eating meals?: None  AM-[facility] Inpatient Daily Activity Raw Score: 24  AM-PAC Inpatient ADL T-Scale Score : 57.54  ADL Inpatient CMS 0-100% Score: 0  ADL Inpatient CMS G-Code Modifier : CH    Goals  Short Term Goals  Goal: Pt will be mod I w/ LB ADLs - goal met 6/17       Therapy Time   Individual Concurrent Group Co-treatment   Time In 1340         Time Out 1405         Minutes 25         Timed Code Treatment Minutes: 15 Minutes (10 min eval)       Lacie Prince, OT

## 2024-06-17 NOTE — PROGRESS NOTES
Arrival from ED to room 523 via wheelchair safely and in stable condition. VSS - afebrile. Pt is alert and oriented x 4 with no history of falls. Assessment completed as charted. Bed is in lowest position with 2/4 bed rails raised and padded, bed alarm turned on, wheels locked and call light within reach - patient refused non-skid socks and verbalizes understanding to call out for assistance. No further requests at this time. Plan of care ongoing.     Vitals:    06/17/24 0332   BP: 133/82   Pulse: 72   Resp: 16   Temp: 98.2 °F (36.8 °C)   SpO2: 98%

## 2024-06-17 NOTE — H&P
Henok Leach,    ALPRAZolam (XANAX) 2 MG tablet Take 2 mg by mouth 3 times daily as needed for Anxiety.  7/1/20   Provider, MD Christopher   methadone (DOLOPHINE) 5 MG tablet Take 5 mg by mouth every 4 hours as needed for Pain.     Provider, MD Christopher       Allergies:  Demerol hcl [meperidine], Gabapentin, and Zoloft [sertraline]    Social History:          TOBACCO:   reports that he has been smoking cigarettes. He has a 11.3 pack-year smoking history. He has never used smokeless tobacco.  ETOH:   reports no history of alcohol use.  E-cigarette/Vaping       Questions Responses    E-cigarette/Vaping Use Never User    Start Date     Passive Exposure     Quit Date     Counseling Given     Comments Unknown              Family History:      Family History reviewed with patient, and does not pertain and non-contributory to the current illness        Problem Relation Age of Onset    Cancer Father     Stroke Sister     Cancer Brother        REVIEW OF SYSTEMS:     Constitutional:  No Fever, No Chills, No Night Sweats  ENT/Mouth:  No Nasal Congestion,  No Hoarseness, No new mouth lesion  Eyes:  No Eye Pain, No Redness, No Discharge  Cardiovascular: + Chest Pain, No Orthopnea, No Palpitations  Respiratory:  No Cough, No Sputum, No Dyspnea  Gastrointestinal: No Vomiting, No Diarrhea, No abdominal pain  Genitourinary: No Urinary Frequency, No Hematuria, No Urinary pain  Musculoskeletal:  No worsening Arthralgias, No worsening Myalgias  Skin:  No new Skin Lesions, No new skin rash  Neuro:  No new weakness, No new numbness.  Psych:  No suicial ideation, No Violence ideation    PHYSICAL EXAM PERFORMED:    /82   Pulse 72   Temp 98.2 °F (36.8 °C) (Oral)   Resp 16   Ht 1.829 m (6')   Wt 104.3 kg (230 lb)   SpO2 98%   BMI 31.19 kg/m²     General appearance:  mild acute distress, appears older than stated age  HEENT:   atraumatic, sclera anicteric, Conjunctivae clear.  Neck: Supple,Trachea midline, no

## 2024-06-17 NOTE — PROGRESS NOTES
Physical Therapy  Order received, chart reviewed, attempted PT eval, in collaboration with pt and OT, pt with no Acute PT needs at this time, please re-order if anything changes  Angelica Davila, PT

## 2024-06-17 NOTE — ED NOTES
Per Dr. DeL a Cruz, pt asking for food. States he can have 1 meal right now and then npo. Pt given sandwich and snacks.

## 2024-06-18 ENCOUNTER — APPOINTMENT (OUTPATIENT)
Dept: CT IMAGING | Age: 64
DRG: 920 | End: 2024-06-18
Payer: MEDICARE

## 2024-06-18 ENCOUNTER — TELEPHONE (OUTPATIENT)
Dept: CARDIAC CATH/INVASIVE PROCEDURES | Age: 64
End: 2024-06-18

## 2024-06-18 ENCOUNTER — APPOINTMENT (OUTPATIENT)
Age: 64
DRG: 920 | End: 2024-06-18
Attending: INTERNAL MEDICINE
Payer: MEDICARE

## 2024-06-18 VITALS
TEMPERATURE: 98.1 F | RESPIRATION RATE: 16 BRPM | BODY MASS INDEX: 28.89 KG/M2 | OXYGEN SATURATION: 96 % | WEIGHT: 218 LBS | DIASTOLIC BLOOD PRESSURE: 92 MMHG | HEIGHT: 73 IN | HEART RATE: 80 BPM | SYSTOLIC BLOOD PRESSURE: 152 MMHG

## 2024-06-18 LAB
ALBUMIN SERPL-MCNC: 3.5 G/DL (ref 3.4–5)
ALBUMIN/GLOB SERPL: 1.3 {RATIO} (ref 1.1–2.2)
ALP SERPL-CCNC: 71 U/L (ref 40–129)
ALT SERPL-CCNC: 10 U/L (ref 10–40)
ANION GAP SERPL CALCULATED.3IONS-SCNC: 7 MMOL/L (ref 3–16)
AST SERPL-CCNC: 10 U/L (ref 15–37)
BASOPHILS # BLD: 0.1 K/UL (ref 0–0.2)
BASOPHILS NFR BLD: 0.6 %
BILIRUB SERPL-MCNC: 0.3 MG/DL (ref 0–1)
BUN SERPL-MCNC: 27 MG/DL (ref 7–20)
CALCIUM SERPL-MCNC: 9.1 MG/DL (ref 8.3–10.6)
CHLORIDE SERPL-SCNC: 103 MMOL/L (ref 99–110)
CO2 SERPL-SCNC: 28 MMOL/L (ref 21–32)
CREAT SERPL-MCNC: 0.9 MG/DL (ref 0.8–1.3)
DEPRECATED RDW RBC AUTO: 14.7 % (ref 12.4–15.4)
ECHO AO ASC DIAM: 3.2 CM
ECHO AO ASCENDING AORTA INDEX: 1.43 CM/M2
ECHO AO ROOT DIAM: 3.9 CM
ECHO AO ROOT INDEX: 1.75 CM/M2
ECHO AV AREA PEAK VELOCITY: 2.8 CM2
ECHO AV AREA VTI: 2.6 CM2
ECHO AV AREA/BSA PEAK VELOCITY: 1.3 CM2/M2
ECHO AV AREA/BSA VTI: 1.2 CM2/M2
ECHO AV MEAN GRADIENT: 3 MMHG
ECHO AV MEAN VELOCITY: 0.8 M/S
ECHO AV PEAK GRADIENT: 6 MMHG
ECHO AV PEAK VELOCITY: 1.3 M/S
ECHO AV VELOCITY RATIO: 0.69
ECHO AV VTI: 28.1 CM
ECHO BSA: 2.26 M2
ECHO EST RA PRESSURE: 8 MMHG
ECHO LA AREA 2C: 29.3 CM2
ECHO LA AREA 4C: 30.2 CM2
ECHO LA DIAMETER INDEX: 1.88 CM/M2
ECHO LA DIAMETER: 4.2 CM
ECHO LA MAJOR AXIS: 6.8 CM
ECHO LA MINOR AXIS: 6.5 CM
ECHO LA TO AORTIC ROOT RATIO: 1.08
ECHO LA VOL BP: 109 ML (ref 18–58)
ECHO LA VOL MOD A2C: 108 ML (ref 18–58)
ECHO LA VOL MOD A4C: 107 ML (ref 18–58)
ECHO LA VOL/BSA BIPLANE: 49 ML/M2 (ref 16–34)
ECHO LA VOLUME INDEX MOD A2C: 48 ML/M2 (ref 16–34)
ECHO LA VOLUME INDEX MOD A4C: 48 ML/M2 (ref 16–34)
ECHO LV E' LATERAL VELOCITY: 10 CM/S
ECHO LV E' SEPTAL VELOCITY: 8 CM/S
ECHO LV FRACTIONAL SHORTENING: 36 % (ref 28–44)
ECHO LV INTERNAL DIMENSION DIASTOLE INDEX: 2.38 CM/M2
ECHO LV INTERNAL DIMENSION DIASTOLIC: 5.3 CM (ref 4.2–5.9)
ECHO LV INTERNAL DIMENSION SYSTOLIC INDEX: 1.52 CM/M2
ECHO LV INTERNAL DIMENSION SYSTOLIC: 3.4 CM
ECHO LV ISOVOLUMETRIC RELAXATION TIME (IVRT): 99 MS
ECHO LV IVSD: 0.8 CM (ref 0.6–1)
ECHO LV MASS 2D: 162.1 G (ref 88–224)
ECHO LV MASS INDEX 2D: 72.7 G/M2 (ref 49–115)
ECHO LV POSTERIOR WALL DIASTOLIC: 0.9 CM (ref 0.6–1)
ECHO LV RELATIVE WALL THICKNESS RATIO: 0.34
ECHO LVOT AREA: 3.8 CM2
ECHO LVOT AV VTI INDEX: 0.69
ECHO LVOT DIAM: 2.2 CM
ECHO LVOT MEAN GRADIENT: 1 MMHG
ECHO LVOT PEAK GRADIENT: 3 MMHG
ECHO LVOT PEAK VELOCITY: 0.9 M/S
ECHO LVOT STROKE VOLUME INDEX: 33.2 ML/M2
ECHO LVOT SV: 74.1 ML
ECHO LVOT VTI: 19.5 CM
ECHO MV A VELOCITY: 0.58 M/S
ECHO MV E DECELERATION TIME (DT): 169 MS
ECHO MV E VELOCITY: 0.66 M/S
ECHO MV E/A RATIO: 1.14
ECHO MV E/E' LATERAL: 6.6
ECHO MV E/E' RATIO (AVERAGED): 7.43
ECHO MV E/E' SEPTAL: 8.25
ECHO RV TAPSE: 1.9 CM (ref 1.7–?)
EOSINOPHIL # BLD: 0.1 K/UL (ref 0–0.6)
EOSINOPHIL NFR BLD: 1.6 %
GFR SERPLBLD CREATININE-BSD FMLA CKD-EPI: >90 ML/MIN/{1.73_M2}
GLUCOSE SERPL-MCNC: 100 MG/DL (ref 70–99)
HCT VFR BLD AUTO: 45.8 % (ref 40.5–52.5)
HGB BLD-MCNC: 15 G/DL (ref 13.5–17.5)
LYMPHOCYTES # BLD: 2.5 K/UL (ref 1–5.1)
LYMPHOCYTES NFR BLD: 27.3 %
MCH RBC QN AUTO: 28.2 PG (ref 26–34)
MCHC RBC AUTO-ENTMCNC: 32.8 G/DL (ref 31–36)
MCV RBC AUTO: 86.1 FL (ref 80–100)
MONOCYTES # BLD: 0.6 K/UL (ref 0–1.3)
MONOCYTES NFR BLD: 6.3 %
NEUTROPHILS # BLD: 5.9 K/UL (ref 1.7–7.7)
NEUTROPHILS NFR BLD: 64.2 %
PLATELET # BLD AUTO: 173 K/UL (ref 135–450)
PMV BLD AUTO: 8.7 FL (ref 5–10.5)
POTASSIUM SERPL-SCNC: 4.7 MMOL/L (ref 3.5–5.1)
PROT SERPL-MCNC: 6.1 G/DL (ref 6.4–8.2)
RBC # BLD AUTO: 5.32 M/UL (ref 4.2–5.9)
SODIUM SERPL-SCNC: 138 MMOL/L (ref 136–145)
WBC # BLD AUTO: 9.3 K/UL (ref 4–11)

## 2024-06-18 PROCEDURE — 85025 COMPLETE CBC W/AUTO DIFF WBC: CPT

## 2024-06-18 PROCEDURE — 6370000000 HC RX 637 (ALT 250 FOR IP): Performed by: NURSE PRACTITIONER

## 2024-06-18 PROCEDURE — 2580000003 HC RX 258: Performed by: INTERNAL MEDICINE

## 2024-06-18 PROCEDURE — 96372 THER/PROPH/DIAG INJ SC/IM: CPT

## 2024-06-18 PROCEDURE — G0378 HOSPITAL OBSERVATION PER HR: HCPCS

## 2024-06-18 PROCEDURE — 6360000002 HC RX W HCPCS: Performed by: INTERNAL MEDICINE

## 2024-06-18 PROCEDURE — 6370000000 HC RX 637 (ALT 250 FOR IP): Performed by: INTERNAL MEDICINE

## 2024-06-18 PROCEDURE — 6370000000 HC RX 637 (ALT 250 FOR IP): Performed by: PHYSICIAN ASSISTANT

## 2024-06-18 PROCEDURE — 36415 COLL VENOUS BLD VENIPUNCTURE: CPT

## 2024-06-18 PROCEDURE — 93306 TTE W/DOPPLER COMPLETE: CPT | Performed by: INTERNAL MEDICINE

## 2024-06-18 PROCEDURE — 1200000000 HC SEMI PRIVATE

## 2024-06-18 PROCEDURE — 93306 TTE W/DOPPLER COMPLETE: CPT

## 2024-06-18 PROCEDURE — 80053 COMPREHEN METABOLIC PANEL: CPT

## 2024-06-18 PROCEDURE — 99222 1ST HOSP IP/OBS MODERATE 55: CPT | Performed by: INTERNAL MEDICINE

## 2024-06-18 RX ORDER — ATORVASTATIN CALCIUM 20 MG/1
20 TABLET, FILM COATED ORAL NIGHTLY
Qty: 30 TABLET | Refills: 3 | Status: SHIPPED | OUTPATIENT
Start: 2024-06-18

## 2024-06-18 RX ORDER — CARVEDILOL 6.25 MG/1
6.25 TABLET ORAL 2 TIMES DAILY WITH MEALS
Qty: 60 TABLET | Refills: 3 | Status: SHIPPED | OUTPATIENT
Start: 2024-06-18

## 2024-06-18 RX ORDER — CARVEDILOL 6.25 MG/1
6.25 TABLET ORAL 2 TIMES DAILY WITH MEALS
Status: DISCONTINUED | OUTPATIENT
Start: 2024-06-18 | End: 2024-06-18 | Stop reason: HOSPADM

## 2024-06-18 RX ORDER — METOPROLOL SUCCINATE 25 MG/1
25 TABLET, EXTENDED RELEASE ORAL 2 TIMES DAILY
Status: DISCONTINUED | OUTPATIENT
Start: 2024-06-18 | End: 2024-06-18

## 2024-06-18 RX ADMIN — ASPIRIN 81 MG 81 MG: 81 TABLET ORAL at 07:45

## 2024-06-18 RX ADMIN — METHADONE HYDROCHLORIDE 5 MG: 5 TABLET ORAL at 11:09

## 2024-06-18 RX ADMIN — ENOXAPARIN SODIUM 30 MG: 100 INJECTION SUBCUTANEOUS at 07:45

## 2024-06-18 RX ADMIN — Medication 100 MG: at 07:44

## 2024-06-18 RX ADMIN — ALPRAZOLAM 1 MG: 1 TABLET ORAL at 12:35

## 2024-06-18 RX ADMIN — METHADONE HYDROCHLORIDE 5 MG: 5 TABLET ORAL at 04:10

## 2024-06-18 RX ADMIN — METOPROLOL SUCCINATE 25 MG: 25 TABLET, EXTENDED RELEASE ORAL at 07:44

## 2024-06-18 RX ADMIN — SODIUM CHLORIDE, PRESERVATIVE FREE 10 ML: 5 INJECTION INTRAVENOUS at 07:45

## 2024-06-18 RX ADMIN — ALPRAZOLAM 1 MG: 1 TABLET ORAL at 07:44

## 2024-06-18 ASSESSMENT — PAIN DESCRIPTION - LOCATION
LOCATION: SHOULDER
LOCATION: SHOULDER
LOCATION: BACK
LOCATION: SHOULDER

## 2024-06-18 ASSESSMENT — PAIN DESCRIPTION - DESCRIPTORS
DESCRIPTORS: ACHING;NAGGING
DESCRIPTORS: ACHING

## 2024-06-18 ASSESSMENT — PAIN SCALES - GENERAL
PAINLEVEL_OUTOF10: 8
PAINLEVEL_OUTOF10: 5
PAINLEVEL_OUTOF10: 6
PAINLEVEL_OUTOF10: 7
PAINLEVEL_OUTOF10: 6

## 2024-06-18 ASSESSMENT — PAIN DESCRIPTION - ORIENTATION
ORIENTATION: LEFT
ORIENTATION: LEFT

## 2024-06-18 NOTE — DISCHARGE SUMMARY
Hospital Medicine Discharge Summary    Patient: Sudhir Kwon   : 1960     Admit Date: 2024   Discharge Date:   2024  Disposition:  [x]Home   []HHC  []SNF  []ECF  []Acute Rehab  []LTAC  []Hospice  Code status:  [x]Full  []DNR/CCA  []Limited (DNR/CCA with Do Not Intubate)  []DNRCC  Condition at Discharge: Stable  Primary Care Provider: Jeevan Adkins MD    Admitting Provider: Abdirahman Davis MD  Discharge Provider: Lashay Campbell, APRN - CNP     Discharge Diagnoses:      Active Hospital Problems    Diagnosis     Chest pain [R07.9]        Presenting Admission History:      This is a 63 y.o. male who presented to [facility] with past medical history of asthma, arthritis, hypertension, sleep apnea noncompliant with CPAP presented to ED due to chest pain and anxiety     Patient reported that he is prescribed Xanax for anxiety reports that he pulled his prescription and then feels really bad that he actually discarded the prescription accidentally after it was immersed in liquid milk.  Patient reported that he has not had his dose for the past 2 days and has very severe anxiety and chest pain that is substernal no alleviating factor, exacerbated with anxiety and exertion.  No associate with fever chills shortness of breath abdominal pain or dysuria.  Patient reported he had stress test previously.     Assessment/Plan:       Current Principal Problem:  Chest pain     SLADE  Benzo withdrawal  - Patient states he takes Xanax at home but threw away his prescription when it got wet from milk  - Restarted Xanax but will decrease dose to 1 mg TID PRN. Will restart home dose at discharge.  - CM asked to see patient. No home needs  - Drug screen positive for Benzo & Methadone  - Discussed with patient weaning off Xanax as it is very addictive. I instructed him to follow up with his PCP to discuss weaning off medication and other options for his anxiety     Chest pain, possibly anginal or  oriented    Patient Discharge Instructions:      Follow up:    1.  Primary Care Provider Jeevan Adkins MD in the next 1-2 weeks.    2. Cardiology, Dr. Garcia, 2 weeks    The patient was seen and examined on day of discharge and this discharge summary is in conjunction with any daily progress note from day of discharge. Time spent on discharge: 45 minutes in the examination, evaluation, counseling and review of medications and discharge plan.    ------------------------------------------------------------------------------------------------------------------------------------------------------    Discharge Medications:   Current Discharge Medication List        START taking these medications    Details   atorvastatin (LIPITOR) 20 MG tablet Take 1 tablet by mouth nightly  Qty: 30 tablet, Refills: 3      carvedilol (COREG) 6.25 MG tablet Take 1 tablet by mouth 2 times daily (with meals)  Qty: 60 tablet, Refills: 3           Current Discharge Medication List        Current Discharge Medication List        CONTINUE these medications which have NOT CHANGED    Details   ketorolac (TORADOL) 10 MG tablet Take 1 tablet by mouth every 6 hours as needed for Pain  Qty: 20 tablet, Refills: 0      aspirin EC 81 MG EC tablet Take 1 tablet by mouth daily  Qty: 90 tablet, Refills: 3      ALPRAZolam (XANAX) 2 MG tablet Take 2 mg by mouth 3 times daily as needed for Anxiety.       methadone (DOLOPHINE) 5 MG tablet Take 5 mg by mouth every 4 hours as needed for Pain.            Current Discharge Medication List        STOP taking these medications       traZODone (DESYREL) 100 MG tablet Comments:   Reason for Stopping:               Significant Test Results    Nuclear stress test with myocardial perfusion    Result Date: 6/17/2024    Stress Combined Conclusion: The study is most consistent with artifact but cannot rule out a small degree of myocardial ischemia. Findings suggest a moderate risk of cardiac events.   Stress Function:

## 2024-06-18 NOTE — PROGRESS NOTES
Patient and family given discharge instructions. All questions and concerns were addressed.  Patient drain removed and dressing placed. Pt prefers to go by walking.

## 2024-06-18 NOTE — TELEPHONE ENCOUNTER
Dr. Garcia would like the patient to see Dr. Leach (saw him in 2022) in 2-4 weeks.  He is refusing CCTA and wants to do it as an outpatient which Dr. Garcia is fine with.  Please assist with scheduling the patient.  Thank you.

## 2024-06-18 NOTE — PLAN OF CARE
Problem: Discharge Planning  Goal: Discharge to home or other facility with appropriate resources  6/17/2024 1443 by Joey Osman, RN  Outcome: Progressing     Problem: Safety - Adult  Goal: Free from fall injury  6/17/2024 2338 by Mague Escalona, RN  Outcome: Progressing  6/17/2024 1443 by Joey Osman, RN  Outcome: Progressing     Problem: Neurosensory - Adult  Goal: Achieves maximal functionality and self care  Outcome: Progressing     Problem: Cardiovascular - Adult  Goal: Absence of cardiac dysrhythmias or at baseline  Outcome: Progressing

## 2024-06-18 NOTE — CARE COORDINATION
D/c order noted. Pt IPTA. Car is here in parking lot and pt plans to drive self home. Pt declined any needs.  Electronically signed by EDGAR CANCHOLA RN on 6/18/2024 at 12:13 PM    
DME:    Patient expects to discharge to: House  Plan for transportation at discharge: Self    Financial    Payor: [facility] MEDICARE / Plan: [facility] MEDICARE COMPLETE / Product Type: *No Product type* /     Does insurance require precert for SNF: Yes    Potential assistance Purchasing Medications: No  Meds-to-Beds request:        [id number] - [place], [place] - [address] - P [phone] - F [phone]  [address]  [facility] [address]  Phone: [phone] Fax: [phone]      Notes:    Factors facilitating achievement of predicted outcomes: Family support    Barriers to discharge: CIWA     Additional Case Management Notes: [place]. Drives and ambulates in the community. active insurance and PCP. Pt declined needs.     The Plan for Transition of Care is related to the following treatment goals of Chest pain [R07.9]  Withdrawal from other psychoactive substance (HCC) [F19.939]  Chest pain, unspecified type [R07.9]    IF APPLICABLE: The Patient and/or patient representative Sudhir and his family were provided with a choice of provider and agrees with the discharge plan. Freedom of choice list with basic dialogue that supports the patient's individualized plan of care/goals and shares the quality data associated with the providers was provided to:     Patient Representative Name:       The Patient and/or Patient Representative Agree with the Discharge Plan?      EDGAR CANCHOLA RN  Case Management Department

## 2024-06-18 NOTE — PROGRESS NOTES
Hospital Medicine Progress Note      Date of Admission: 6/16/2024  Hospital Day: 3    Chief Admission Complaint:      Chest Pain and Palpitations (Left sided chest pain & palpitations since this morning accompanied with nausea.)      Subjective:          Presenting Admission History:       This is a 63 y.o. male who presented to [facility] with past medical history of asthma, arthritis, hypertension, sleep apnea noncompliant with CPAP presented to ED due to chest pain and anxiety     Patient reported that he is prescribed Xanax for anxiety reports that he pulled his prescription and then feels really bad that he actually discarded the prescription accidentally after it was immersed in liquid milk.  Patient reported that he has not had his dose for the past 2 days and has very severe anxiety and chest pain that is substernal no alleviating factor, exacerbated with anxiety and exertion.  No associate with fever chills shortness of breath abdominal pain or dysuria.  Patient reported he had stress test previously.    Assessment/Plan:      Current Principal Problem:  Chest pain    Anxiety  Benzo withdrawal  - Patient states he takes Xanax at home but threw away his prescription when it got wet from milk  - Restart Xanax but will decrease dose to 1 mg TID PRN   - CM asked to see patient   - Drug screen positive for Benzo & Methadone    Chest pain, possibly anginal or possibly 2/2 recent proximal bicep repair 4/2024  - Stress test revealed the study is most consistent with artifact but cannot rule out a small degree of myocardial ischemia. Findings suggest a moderate risk of cardiac events.   - Cardiology consulted; Appreciate their input  - Troponin flat 8 ---> 9  - Continue ASA 81 mg. Toprol Xl, and Liptior  - Recent shoulder surgery with [facility] for torn Bicep 4/4/2024. Continuing PT at [facility].  - CCTA ordered    HTN - unknown CAD with equivocal stress test.  Currently uncontrolled  - Begin Toprol XL 25 mg BID per

## 2024-06-18 NOTE — CONSULTS
CARDIOLOGY CONSULTATION        Patient Name: Sudhir Kwon  Date of admission: 6/16/2024 11:05 PM  Admission Dx: Chest pain [R07.9]  Withdrawal from other psychoactive substance (HCC) [F19.939]  Chest pain, unspecified type [R07.9]  Requesting Physician: Rashawn De La Cruz DO  Primary Care physician: Jeevan Adkins MD    Reason for Consultation/Chief Complaint: Chest pain    History of Present Illness:     Sudhir Kwon is a 63 y.o. man with HTN, HLP, CARROLL noncompliant with CPAP, asthma and arthritis admitted for chest pain and anxiety.  Patient has had recent left shoulder surgery in 4/2023 and reports persistent pain and limited range of motion.  He also reports left sided chest discomfort and pain radiating down his left arm.  Patient is followed in clinic by Dr Leach, but was last seen in 1/2022.  He attributes his symptoms to increased personal stressors and anxiety.  He smokes 1 pack per week for many years.  He denies any prior cardiac procedures.  Primary service ordered a stress test which came back with equivocal results.  Cardiology consulted to assess and manage symptoms.    Past Medical History:   has a past medical history of Arthritis, Asthma, Atelectasis, Bilateral low back pain with sciatica, Depression, Drug overdose, Enlarged prostate, Fractured pelvis (HCC), Fractured rib, Hypertension, Irregular heartbeat, Lumbar degenerative disc disease, Methadone use, Other chest pain, Pneumonia, Pneumothorax, left, Polyp of transverse colon, RBBB, Sleep apnea, and Urinary incontinence.    Surgical History:   has a past surgical history that includes laminectomy; Lumbar spine surgery; Cardiac surgery; lumbar fusion; fracture surgery (Left); Colonoscopy (N/A, 8/20/2019); Knee Arthroplasty (Left); and Cystoscopy (N/A, 6/28/2022).     Social History:   reports that he has been smoking cigarettes. He has a 11.3 pack-year smoking history. He has never used smokeless tobacco. He reports that he does not 
06/18/2024 called for cardio consult. @0801. IZAIAH BORGES  
Consult Placed     Who:   Date:  Time:     Electronically signed by Lilliana Koroma RN on 6/17/2024 at 8:05 AM     
Consult was placed to [facility] on 6- at 15:29 for a positive stress test result. Waiting for call back from doctor. Stefan Colin PCT   
advised.      Stress echo 12/08/2021: Summary  Baseline ECHO shows normal LV function with ejection fraction in the 60% range. With exercise there is augmentation of all segments with no areas of stress induced hypokinesis.     Cardiac catheterization:  Per chart notes from Dr Leach office visit 1/2022: Cath: Reports undergoing cardiac catheterization in 30s, although I cannot find any definitive documentation of this in his chart.  No intervention was apparently performed at the time.     Additional studies:   Carotid US 12/08/2021: Summary   Normal left ventricle size, wall thickness and systolic function with an estimated ejection fraction of 55-60%. Definity contrast administered with no evidence of left ventricular mass or thrombus noted. No regional wall motion abnormalities are seen. Normal left ventricular diastolic filling pressure. Normal RV size and systolic function. Mild mitral and tricuspid regurgitation. Systolic pulmonary artery pressure (SPAP) is normal and estimated at 25 mmHg (right atrial pressure 3 mmHg).     Impression and Plan:      Equivocal stress test  Chest pain  Anxiety  HTN  HLP  CARROLL    --Echo completed with results pending; patient declined cardiac CTA this am.  --Continue ASA 81mg and Lipitor 20mg qHS.  --Switch toprol XL to Coreg 6.25mg bid for better BP control.  --Replete electrolytes as needed.  --FU in cardiology clinic to schedule out patient cardiac CTA.    Cardiology will sign off at this time.  Please call back with questions.      Thank you for allowing us to participate in the care of Sudhir Martrick. Please call me with any questions [phone].    Fitz Garcia MD [facility]  Cardiovascular Disease  [facility] [place]  [phone] [place] Office  [phone] [place] Office  6/18/2024 11:50 AM

## 2024-06-19 NOTE — TELEPHONE ENCOUNTER
6/19 Called [phone]. [facility] for pt to call to get overbook appt time and date for [facility]

## 2024-06-19 NOTE — ED PROVIDER NOTES
REMOVAL HOT BIOPSY performed by Kevin Knox MD at [facility] ENDOSCOPY    CYSTOSCOPY N/A 6/28/2022    CYSTOSCOPY, UROLIFT performed by Alli Roque MD at [facility] OR    FRACTURE SURGERY Left     ulna    KNEE ARTHROPLASTY Left     wears brace left leg    LAMINECTOMY      2 partial laminectomy    LUMBAR FUSION      LUMBAR SPINE SURGERY      pain stimulator placed in lower back left side L4-L5 then removed     Family History   Problem Relation Age of Onset    Cancer Father     Stroke Sister     Cancer Brother      Social History     Socioeconomic History    Marital status:      Spouse name: Not on file    Number of children: 3    Years of education: Not on file    Highest education level: Not on file   Occupational History    Occupation:     Occupation: Maintenance   Tobacco Use    Smoking status: Light Smoker     Current packs/day: 0.75     Average packs/day: 0.8 packs/day for 15.0 years (11.3 ttl pk-yrs)     Types: Cigarettes    Smokeless tobacco: Never   Vaping Use    Vaping Use: Never used   Substance and Sexual Activity    Alcohol use: No    Drug use: Not Currently     Comment: none for 30 years    Sexual activity: Not Currently   Other Topics Concern    Not on file   Social History Narrative    ** Merged History Encounter **            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    3 children-2 boys and girl    Son passed away 3 years ago ([place] on 2/27/)    2 brothers passed away    1 sister passed away    -custody of daughter 15 years old          Social Determinants of Health     Financial Resource Strain: Medium Risk (2/23/2021)    Overall Financial Resource Strain (CARDIA)     Difficulty of Paying Living Expenses: Somewhat hard   Food Insecurity: No Food Insecurity (6/17/2024)    Hunger Vital Sign     Worried About Running Out of Food in the Last Year: Never true     Ran Out of Food in the Last Year: Never true   Transportation Needs: No Transportation Needs (6/17/2024)    PRAPARGERMAN  Gradient 6 mmHg    AV Area by VTI 2.6 cm2    AV Area by Peak Velocity 2.8 cm2    Aortic Root 3.9 cm    Ascending Aorta 3.2 cm    LV IVRT 99.0 ms    IVSd 0.8 0.6 - 1.0 cm    LVIDd 5.3 4.2 - 5.9 cm    LVIDs 3.4 cm    LVOT Diameter 2.2 cm    LVOT Mean Gradient 1 mmHg    LVOT VTI 19.5 cm    LVOT Peak Velocity 0.9 m/s    LVOT Peak Gradient 3 mmHg    LVPWd 0.9 0.6 - 1.0 cm    LV E' Lateral Velocity 10 cm/s    LV E' Septal Velocity 8 cm/s    LVOT Area 3.8 cm2    LVOT SV 74.1 ml    MV E Wave Deceleration Time 169.0 ms    MV A Velocity 0.58 m/s    MV E Velocity 0.66 m/s    Est. RA Pressure 8 mmHg    TAPSE 1.9 1.7 cm    Body Surface Area 2.26 m2    Fractional Shortening 2D 36 28 - 44 %    LVIDd Index 2.38 cm/m2    LVIDs Index 1.52 cm/m2    LV RWT Ratio 0.34     LV Mass 2D 162.1 88 - 224 g    LV Mass 2D Index 72.7 49 - 115 g/m2    MV E/A 1.14     E/E' Ratio (Averaged) 7.43     E/E' Lateral 6.60     E/E' Septal 8.25     LA Volume Index BP 49 (A) 16 - 34 ml/m2    LVOT Stroke Volume Index 33.2 mL/m2    LA Volume Index MOD A2C 48 (A) 16 - 34 ml/m2    LA Volume Index MOD A4C 48 (A) 16 - 34 ml/m2    LA Size Index 1.88 cm/m2    LA/AO Root Ratio 1.08     Ao Root Index 1.75 cm/m2    Ascending Aorta Index 1.43 cm/m2    AV Velocity Ratio 0.69     LVOT:AV VTI Index 0.69     MAITE/BSA VTI 1.2 cm2/m2    MAITE/BSA Peak Velocity 1.3 cm2/m2     I estimate there is a HIGH risk for CHEST PAIN and WITHDRAWAL, thus I considered the decision for admission reasonable. I estimate there is LOW risk for PULMONARY EMBOLISM,  OR THORACIC AORTIC DISSECTION.  Sudhir Kwon and I have discussed the diagnosis and risks, and we agree with admission to the hospital.    FINAL Impression    1. Chest pain, unspecified type    2. Withdrawal from other psychoactive substance (HCC)        Blood pressure (!) 152/92, pulse 80, temperature 98.1 °F (36.7 °C), temperature source Oral, resp. rate 16, height 1.854 m (6' 1\"), weight 98.9 kg (218 lb), SpO2 96 %.

## 2024-06-20 NOTE — TELEPHONE ENCOUNTER
Pt returned call. Pt cannot do 07.10 at 3.15 due to having another appointment at 3. Please advise.

## 2024-06-21 LAB
BACTERIA BLD CULT ORG #2: NORMAL
BACTERIA BLD CULT: NORMAL

## 2024-06-28 NOTE — PROGRESS NOTES
[facility]   CONSULTATION  [phone]      Attending Physician: No att. providers found  Reason for Consultation/Chief Complaint:   Follow-up for chest pain    Subjective   History of Present Illness:  Sudhir Kwon is a 63 y.o. male with a history of obesity, essential hypertension, hyperlipidemia, bilateral knee osteoarthritis, lumbar DDD, and tobacco use who presents for follow-up for chest pain.    The patient was admitted to [facility] in June with chest pain.  EKG showed sinus rhythm with sinus arrhythmia and right bundle branch block.  High-sensitivity troponin was within normal limits x3.  A pharmacologic nuclear SPECT stress test was obtained and showed an apical septal and apical inferior defect equivocal for artifact versus ischemia.  TTE obtained during his admission showed an LVEF of 55-60% with normal wall motion, normal diastolic function, normal RV size and systolic function, mild left atrial dilatation and no hemodynamically significant valvular abnormalities.    Today, the patient reports that he has been having chest pain and believes that this is related to him being off methadone.  He says that he is also trying to wean off Xanax.  He says that he was placed on these medications after his son passed away at [facility].He describes his chest pain as sharp and says that it would radiate down his arm.  The pain can be triggered by exertion, anxiety, and increased BP.  He further reports associated shortness of breath.  He says that he has also been having diarrhea.  He additionally feels like his heart is beating out of his chest at times.  He further reports that he has pain in his calves and knees every day when walking.  He says that he cannot feel anything in his legs.  He also states that he gets headaches every day.    Past Medical History:   has a past medical history of Arthritis, Asthma, Atelectasis, Bilateral low back pain with sciatica, Depression,

## 2024-07-10 ENCOUNTER — OFFICE VISIT (OUTPATIENT)
Dept: CARDIOLOGY CLINIC | Age: 64
End: 2024-07-10
Payer: MEDICARE

## 2024-07-10 VITALS
BODY MASS INDEX: 28.96 KG/M2 | SYSTOLIC BLOOD PRESSURE: 132 MMHG | HEIGHT: 73 IN | DIASTOLIC BLOOD PRESSURE: 82 MMHG | HEART RATE: 92 BPM | OXYGEN SATURATION: 97 % | WEIGHT: 218.5 LBS

## 2024-07-10 DIAGNOSIS — M79.662 PAIN IN BOTH LOWER LEGS: ICD-10-CM

## 2024-07-10 DIAGNOSIS — I10 ESSENTIAL HYPERTENSION: Primary | ICD-10-CM

## 2024-07-10 DIAGNOSIS — I73.9 CLAUDICATION OF BOTH LOWER EXTREMITIES (HCC): ICD-10-CM

## 2024-07-10 DIAGNOSIS — R07.9 CHEST PAIN, UNSPECIFIED TYPE: ICD-10-CM

## 2024-07-10 DIAGNOSIS — I10 HYPERTENSION, UNSPECIFIED TYPE: ICD-10-CM

## 2024-07-10 DIAGNOSIS — R94.39 EQUIVOCAL STRESS TEST: ICD-10-CM

## 2024-07-10 DIAGNOSIS — M79.661 PAIN IN BOTH LOWER LEGS: ICD-10-CM

## 2024-07-10 DIAGNOSIS — I25.10 CORONARY ARTERY CALCIFICATION SEEN ON CT SCAN: ICD-10-CM

## 2024-07-10 DIAGNOSIS — E78.5 HYPERLIPIDEMIA, UNSPECIFIED HYPERLIPIDEMIA TYPE: ICD-10-CM

## 2024-07-10 DIAGNOSIS — R00.2 PALPITATIONS: ICD-10-CM

## 2024-07-10 DIAGNOSIS — R94.39 ABNORMAL STRESS TEST: ICD-10-CM

## 2024-07-10 DIAGNOSIS — Z79.899 MEDICATION MANAGEMENT: ICD-10-CM

## 2024-07-10 DIAGNOSIS — Z72.0 TOBACCO ABUSE: ICD-10-CM

## 2024-07-10 PROCEDURE — 1111F DSCHRG MED/CURRENT MED MERGE: CPT | Performed by: INTERNAL MEDICINE

## 2024-07-10 PROCEDURE — 4004F PT TOBACCO SCREEN RCVD TLK: CPT | Performed by: INTERNAL MEDICINE

## 2024-07-10 PROCEDURE — 93000 ELECTROCARDIOGRAM COMPLETE: CPT | Performed by: INTERNAL MEDICINE

## 2024-07-10 PROCEDURE — 3017F COLORECTAL CA SCREEN DOC REV: CPT | Performed by: INTERNAL MEDICINE

## 2024-07-10 PROCEDURE — 3079F DIAST BP 80-89 MM HG: CPT | Performed by: INTERNAL MEDICINE

## 2024-07-10 PROCEDURE — 3075F SYST BP GE 130 - 139MM HG: CPT | Performed by: INTERNAL MEDICINE

## 2024-07-10 PROCEDURE — G8419 CALC BMI OUT NRM PARAM NOF/U: HCPCS | Performed by: INTERNAL MEDICINE

## 2024-07-10 PROCEDURE — 99214 OFFICE O/P EST MOD 30 MIN: CPT | Performed by: INTERNAL MEDICINE

## 2024-07-10 PROCEDURE — G8427 DOCREV CUR MEDS BY ELIG CLIN: HCPCS | Performed by: INTERNAL MEDICINE

## 2024-07-10 RX ORDER — ACETAMINOPHEN 325 MG/1
650 TABLET ORAL EVERY 4 HOURS PRN
COMMUNITY
Start: 2014-09-02

## 2024-07-10 RX ORDER — ATORVASTATIN CALCIUM 40 MG/1
40 TABLET, FILM COATED ORAL NIGHTLY
Qty: 90 TABLET | Refills: 3 | Status: SHIPPED | OUTPATIENT
Start: 2024-07-10

## 2024-07-10 NOTE — PATIENT INSTRUCTIONS
Start taking aspirin 81 mg daily  Start taking atorvastatin 40 mg tablet qHS  Start taking 25 mg metoprolol tartrate BID  Vascular ultrasound bilateral duplex to look for blockages.  Cardiac CTA ordered to look for blockages in heart.  Can take 50 mg of metoprolol tartrate on morning of cardiac CTA and give additional IV metoprolol and/or IV diltiazem as needed to achieve goal HR <60 bpm for study.  -Call [facility] central scheduling at [phone] to schedule testing.  Fasting Lipid panel ordered  Reviewed risks of tobacco use and strongly encouraged smoking cessation.  Follow-up with me in  3 months .

## 2024-07-25 ENCOUNTER — HOSPITAL ENCOUNTER (OUTPATIENT)
Dept: VASCULAR LAB | Age: 64
Discharge: HOME | End: 2024-07-27
Attending: INTERNAL MEDICINE
Payer: MEDICARE

## 2024-07-25 DIAGNOSIS — M79.661 PAIN IN BOTH LOWER LEGS: ICD-10-CM

## 2024-07-25 DIAGNOSIS — I73.9 CLAUDICATION OF BOTH LOWER EXTREMITIES (HCC): ICD-10-CM

## 2024-07-25 DIAGNOSIS — M79.662 PAIN IN BOTH LOWER LEGS: ICD-10-CM

## 2024-07-25 PROCEDURE — 93925 LOWER EXTREMITY STUDY: CPT

## 2024-07-26 LAB
VAS LEFT ABI: 1.15
VAS LEFT ATA DIST PSV: 55.4 CM/S
VAS LEFT CFA DIST PSV: 151 CM/S
VAS LEFT CFA PROX PSV: 138 CM/S
VAS LEFT DORSALIS PEDIS BP: 180 MMHG
VAS LEFT PFA PROX PSV: 77 CM/S
VAS LEFT POP A DIST PSV: 76.4 CM/S
VAS LEFT PTA BP: 184 MMHG
VAS LEFT PTA DIST PSV: 88.9 CM/S
VAS LEFT PTA MID PSV: 70.8 CM/S
VAS LEFT SFA DIST PSV: 147 CM/S
VAS LEFT SFA DIST VEL RATIO: 1.36
VAS LEFT SFA MID PSV: 108 CM/S
VAS RIGHT ABI: 1.2
VAS RIGHT ARM BP: 160 MMHG
VAS RIGHT ATA DIST PSV: 41.9 CM/S
VAS RIGHT CFA DIST PSV: 118 CM/S
VAS RIGHT CFA PROX PSV: 183 CM/S
VAS RIGHT DORSALIS PEDIS BP: 190 MMHG
VAS RIGHT PERONEAL MID PSV: 49.4 CM/S
VAS RIGHT PFA PROX PSV: 60.9 CM/S
VAS RIGHT POP A DIST PSV: 73 CM/S
VAS RIGHT POP A PROX PSV: 119 CM/S
VAS RIGHT POP A PROX VEL RATIO: 1.53
VAS RIGHT PTA BP: 192 MMHG
VAS RIGHT PTA DIST PSV: 84 CM/S
VAS RIGHT PTA MID PSV: 90.1 CM/S
VAS RIGHT SFA DIST PSV: 77.7 CM/S
VAS RIGHT SFA DIST VEL RATIO: 1.02
VAS RIGHT SFA MID PSV: 76.4 CM/S
VAS RIGHT SFA MID VEL RATIO: 1.1
VAS RIGHT SFA PROX PSV: 67.7 CM/S
VAS RIGHT SFA PROX VEL RATIO: 0.4

## 2024-07-29 ENCOUNTER — TELEPHONE (OUTPATIENT)
Dept: CARDIOLOGY CLINIC | Age: 64
End: 2024-07-29

## 2024-07-29 NOTE — TELEPHONE ENCOUNTER
Pt stated that he has been dizzy and thinks its from one of his medications that [facility] prescribes but he is not sure what medication. He just had a shoulder replacement so he does not want to fall. He is also trying to come off Alprazolam and is not sure if [facility] was aware. Please advise.

## 2024-07-30 NOTE — TELEPHONE ENCOUNTER
Lets please have patient check his BP 2-3 times daily and keep a log of his recordings.  I would like for him to call back in 1 week with his BP log.  If he is having lower BP readings that are contributing to his symptoms, we can consider stopping his metoprolol.  It is possible that his alprazolam, given that this is a sedating medication, could be contributing to his symptoms as well.

## 2024-07-31 NOTE — TELEPHONE ENCOUNTER
Pt returned call. Message given. 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 Pt states he is in the process of getting off alprazolam. Pt states he stopped taking metoprolol and atorvastatin a week ago because of headaches. Pt states he is still having symptoms. Please advise.

## 2024-08-01 NOTE — TELEPHONE ENCOUNTER
Called and spoke with pt, asked pt if he has the means to purchase a BP at local pharmacy. Pt states its not his BP, had it taken at his PCP recently and was not low. He did not know numbers or date for certain. Pt states he stopped Metoprolol and lipitor and does not want to take them again. Pt states he had shoulder surgery and has fallen 2 times d/t dizziness and does not want to mess up recent surgery.      [facility] please advise

## 2024-08-06 NOTE — TELEPHONE ENCOUNTER
PT RAGHU of [facility] instructions, and is aware fu apt on 10/23/24 @ 230 in [place].

## 2024-08-19 ENCOUNTER — TELEPHONE (OUTPATIENT)
Dept: CARDIOLOGY CLINIC | Age: 64
End: 2024-08-19

## 2024-08-19 NOTE — TELEPHONE ENCOUNTER
[facility] SHERRY P please advise, pt stopped taking metoprolol 7/29/24 per telephone encounter, due to dizziness and falling after shoulder surgery. He is very leary about taking metoprolol even in a controlled setting at the hospital for his CTA.  He is unsure what his HR is running, and stated it goes up when he goes to the doctors. All he is taking currently is xanax.

## 2024-08-19 NOTE — TELEPHONE ENCOUNTER
If the patient's heart rate is above 60, he should take the metoprolol 1 hour prior to his cardiac CTA.    If he wants to wait to recover after his shoulder procedure, I think that is reasonable.

## 2024-08-19 NOTE — TELEPHONE ENCOUNTER
Called and spoke to pt regarding VSP recommendations. Pt would like to reschedule CTA, pt given CS number.  Pt aware fu apt with [facility] is 10/23 @ 230. Pt advised to have it done prior to apt in October with [facility] or to call us back to reschedule [facility] fu apt as well.

## 2024-08-19 NOTE — TELEPHONE ENCOUNTER
Pt stated that he is not able to take the Lopressor due to it causing dizziness. Pt would like to know if he has to take it prior to the CTA on 8/21/24. Pt would also like to know if he should continue with the CTA due to his shoulder injury or if its ok to postpone. Please advise.

## 2024-08-19 NOTE — TELEPHONE ENCOUNTER
Pt sts he has a CTA scheduled 8/21/24. Pt sts he has not been told anything about this testing or what prep he needs to do prior to testing. Please advise.

## 2024-10-23 ENCOUNTER — TELEPHONE (OUTPATIENT)
Dept: CARDIOLOGY CLINIC | Age: 64
End: 2024-10-23

## 2024-10-23 NOTE — TELEPHONE ENCOUNTER
Spoke with pt, advised ER d/t CP, pt v/u. Relayed to call [facility] d/t no transportation,  pt v/u    [facility] JOHNNA

## 2024-10-23 NOTE — TELEPHONE ENCOUNTER
Pt called and stated that he was scheduled to see [facility] 10/2324. Pt called to cancel advising that [facility] wanted to have a fu appt for after CTA that was rs to 11/14/24.  Please contact pt to  appt for after CTA.

## 2024-10-23 NOTE — TELEPHONE ENCOUNTER
10/23- called pt @[phone] I offered next available at [facility] 1/8/2025 for after CTA scan 11/14/2024. Pt stated he is not comfortable waiting that long. pt stated he had shoulder surgery 4/04/2024 and his arm has off and on numbness since. Pt is wondering if this could be related to his heart. Pt stated he is also having off and on CP since surgery. Pt stated he went to the hosp. In June for CP b/c he thought he was having a heart attack. Pt stated this symptom have persisted. Pt stated he has worsening SOB and dizziness all the time. Please advise.

## 2024-11-14 ENCOUNTER — HOSPITAL ENCOUNTER (OUTPATIENT)
Dept: CT IMAGING | Age: 64
Discharge: HOME | End: 2024-11-14
Attending: INTERNAL MEDICINE
Payer: MEDICARE

## 2024-11-14 ENCOUNTER — TELEPHONE (OUTPATIENT)
Dept: CARDIOLOGY CLINIC | Age: 64
End: 2024-11-14

## 2024-11-14 VITALS
HEART RATE: 66 BPM | OXYGEN SATURATION: 97 % | RESPIRATION RATE: 18 BRPM | SYSTOLIC BLOOD PRESSURE: 148 MMHG | DIASTOLIC BLOOD PRESSURE: 78 MMHG

## 2024-11-14 DIAGNOSIS — R94.39 ABNORMAL STRESS TEST: ICD-10-CM

## 2024-11-14 DIAGNOSIS — R07.9 CHEST PAIN, UNSPECIFIED TYPE: ICD-10-CM

## 2024-11-14 DIAGNOSIS — G89.4 CHRONIC PAIN DISORDER: Primary | ICD-10-CM

## 2024-11-14 LAB
PERFORMED ON: NORMAL
POC CREATININE: 1 MG/DL (ref 0.8–1.3)
POC SAMPLE TYPE: NORMAL

## 2024-11-14 PROCEDURE — 75574 CT ANGIO HRT W/3D IMAGE: CPT

## 2024-11-14 PROCEDURE — 2500000003 HC RX 250 WO HCPCS

## 2024-11-14 PROCEDURE — 6370000000 HC RX 637 (ALT 250 FOR IP): Performed by: INTERNAL MEDICINE

## 2024-11-14 PROCEDURE — 82565 ASSAY OF CREATININE: CPT

## 2024-11-14 PROCEDURE — 2500000003 HC RX 250 WO HCPCS: Performed by: INTERNAL MEDICINE

## 2024-11-14 PROCEDURE — 6360000004 HC RX CONTRAST MEDICATION: Performed by: INTERNAL MEDICINE

## 2024-11-14 RX ORDER — NITROGLYCERIN 0.4 MG/1
0.8 TABLET SUBLINGUAL
Status: CANCELLED | OUTPATIENT
Start: 2024-11-14 | End: 2024-11-15

## 2024-11-14 RX ORDER — METOPROLOL TARTRATE 1 MG/ML
5 INJECTION, SOLUTION INTRAVENOUS EVERY 5 MIN PRN
Status: DISCONTINUED | OUTPATIENT
Start: 2024-11-14 | End: 2024-11-15 | Stop reason: HOSPADM

## 2024-11-14 RX ORDER — IOPAMIDOL 755 MG/ML
100 INJECTION, SOLUTION INTRAVASCULAR
Status: COMPLETED | OUTPATIENT
Start: 2024-11-14 | End: 2024-11-14

## 2024-11-14 RX ORDER — SODIUM CHLORIDE 9 MG/ML
INJECTION, SOLUTION INTRAVENOUS PRN
Status: DISCONTINUED | OUTPATIENT
Start: 2024-11-14 | End: 2024-11-15 | Stop reason: HOSPADM

## 2024-11-14 RX ORDER — METOPROLOL TARTRATE 1 MG/ML
INJECTION, SOLUTION INTRAVENOUS
Status: COMPLETED
Start: 2024-11-14 | End: 2024-11-14

## 2024-11-14 RX ORDER — NITROGLYCERIN 0.4 MG/1
0.4 TABLET SUBLINGUAL
Status: COMPLETED | OUTPATIENT
Start: 2024-11-14 | End: 2024-11-14

## 2024-11-14 RX ORDER — SODIUM CHLORIDE 0.9 % (FLUSH) 0.9 %
5-40 SYRINGE (ML) INJECTION EVERY 12 HOURS SCHEDULED
Status: CANCELLED | OUTPATIENT
Start: 2024-11-14

## 2024-11-14 RX ORDER — SODIUM CHLORIDE 0.9 % (FLUSH) 0.9 %
5-40 SYRINGE (ML) INJECTION PRN
Status: CANCELLED | OUTPATIENT
Start: 2024-11-14

## 2024-11-14 RX ORDER — METOPROLOL TARTRATE 50 MG
TABLET ORAL
Qty: 3 TABLET | Refills: 0 | Status: SHIPPED | OUTPATIENT
Start: 2024-11-14

## 2024-11-14 RX ORDER — SODIUM CHLORIDE 9 MG/ML
INJECTION, SOLUTION INTRAVENOUS PRN
Status: CANCELLED | OUTPATIENT
Start: 2024-11-14

## 2024-11-14 RX ORDER — METOPROLOL TARTRATE 1 MG/ML
5 INJECTION, SOLUTION INTRAVENOUS EVERY 5 MIN PRN
Status: CANCELLED | OUTPATIENT
Start: 2024-11-14

## 2024-11-14 RX ORDER — NITROGLYCERIN 0.4 MG/1
0.4 TABLET SUBLINGUAL
Status: CANCELLED | OUTPATIENT
Start: 2024-11-14 | End: 2024-11-15

## 2024-11-14 RX ORDER — NITROGLYCERIN 0.4 MG/1
0.8 TABLET SUBLINGUAL
Status: COMPLETED | OUTPATIENT
Start: 2024-11-14 | End: 2024-11-14

## 2024-11-14 RX ORDER — SODIUM CHLORIDE 0.9 % (FLUSH) 0.9 %
5-40 SYRINGE (ML) INJECTION EVERY 12 HOURS SCHEDULED
Status: DISCONTINUED | OUTPATIENT
Start: 2024-11-14 | End: 2024-11-15 | Stop reason: HOSPADM

## 2024-11-14 RX ORDER — SODIUM CHLORIDE 0.9 % (FLUSH) 0.9 %
5-40 SYRINGE (ML) INJECTION PRN
Status: DISCONTINUED | OUTPATIENT
Start: 2024-11-14 | End: 2024-11-15 | Stop reason: HOSPADM

## 2024-11-14 RX ADMIN — METOPROLOL TARTRATE 5 MG: 5 INJECTION INTRAVENOUS at 09:26

## 2024-11-14 RX ADMIN — METOPROLOL TARTRATE 5 MG: 5 INJECTION INTRAVENOUS at 09:21

## 2024-11-14 RX ADMIN — NITROGLYCERIN 0.8 MG: 0.4 TABLET SUBLINGUAL at 10:09

## 2024-11-14 RX ADMIN — IOPAMIDOL 100 ML: 755 INJECTION, SOLUTION INTRAVENOUS at 10:31

## 2024-11-14 NOTE — OR NURSING
Patient tolerated CTA well. VSS and voiced no concerns. IV was removed and patient was discharged.

## 2024-11-14 NOTE — TELEPHONE ENCOUNTER
Lilliana from [facility] stated that pt is coming in on 11/14 at 9am for cta. They need the correct orders added to chart for cta order panel with nitro and metoprolol.

## 2024-11-14 NOTE — PROGRESS NOTES
Patient arrived for Coronary CTA.     History and medications were reviewed with patient. Patient denies questions or concerns at this time.     Demerol hcl [meperidine], Gabapentin, and Zoloft [sertraline]    Past Medical History:   Diagnosis Date    Arthritis     Asthma     Atelectasis     Bilateral low back pain with sciatica 01/19/2016    difficulty walking - uses cane or walker    Depression     Drug overdose 8/28/2016    Enlarged prostate     Fractured pelvis (HCC)     Fractured rib     Hypertension     Irregular heartbeat     Lumbar degenerative disc disease 1/19/2016    Methadone use 10/13/2014    Other chest pain 5/2/2019    Pneumonia     Pneumothorax, left     Polyp of transverse colon     RBBB     Sleep apnea     did not tolerate CPAP    Urinary incontinence        Prior to Admission medications    Medication Sig Start Date End Date Taking? Authorizing Provider   metoprolol tartrate (LOPRESSOR) 50 MG tablet Two hours before your CTA test check your heart rate, take by mouth 1 tablet if your heart rate is 55-70, 2 tablets if your heart rate is 71-80, 3 tablets if heart rate greater than 80, and no tablets if heart rate is less than 55. 11/14/24   Henok Leach, DO   metoprolol tartrate (LOPRESSOR) 25 MG tablet TAKE 1 TABLET BY MOUTH TWICE DAILY 7/12/24   Henok Leach DO   acetaminophen (TYLENOL) 325 MG tablet Take 2 tablets by mouth every 4 hours as needed for Pain 9/2/14   Provider, MD Christopher   atorvastatin (LIPITOR) 40 MG tablet Take 1 tablet by mouth nightly 7/10/24   Henok Leach DO   ketorolac (TORADOL) 10 MG tablet Take 1 tablet by mouth every 6 hours as needed for Pain  Patient not taking: Reported on 7/10/2024 6/28/22 6/28/23  Alli Roque MD   aspirin EC 81 MG EC tablet Take 1 tablet by mouth daily  Patient not taking: Reported on 7/10/2024 1/19/22   Henok Leach DO   ALPRAZolam (XANAX) 2 MG tablet Take 1 tablet by mouth 3 times daily as needed for Anxiety. 7/1/20   Provider, 
Patient states did not have any metoprolol to take, only takes a xanax and a \"cholesterol\" med.  
MD Christopher   methadone (DOLOPHINE) 5 MG tablet Take 5 mg by mouth every 4 hours as needed for Pain.   Patient not taking: Reported on 7/10/2024    Provider, MD Christopher       Vitals:    11/14/24 0926   BP: (!) 146/90   Pulse: 66   Resp:    SpO2:        Patient took noMetoprolol  last night:   Patient took no of Metoprolol this morning: No    IV placed: 20G to right ac    Current heart rhythm:  BBB         
do not go away with rest or are not getting better within 5 minutes after you take a dose of nitroglycerin.   Call your doctor now or seek immediate medical care if:    You have signs of infection, such as:  Increased pain, swelling, warmth, or redness.  Red streaks leading from the catheter site.  Pus draining from the catheter site.  A fever.     Your leg, arm, or hand is painful, looks blue, or feels cold, numb, or tingly.   Watch closely for changes in your health, and be sure to contact your doctor if you have any problems.  Where can you learn more?    [email or web address]    If you do not have an account, please click on the \"Sign Up Now\" link.  Current as of: August 31, 2020               Content Version: 12.9  © 6108-7636 [employer].   Care instructions adapted under license by [employer]. If you have questions about a medical condition or this instruction, always ask your healthcare professional. [employer] disclaims any warranty or liability for your use of this information.

## 2024-11-18 ENCOUNTER — TELEPHONE (OUTPATIENT)
Dept: CARDIOLOGY CLINIC | Age: 64
End: 2024-11-18

## 2024-11-18 DIAGNOSIS — R93.1 ABNORMAL COMPUTED TOMOGRAPHY ANGIOGRAPHY OF HEART: Primary | ICD-10-CM

## 2024-11-18 NOTE — TELEPHONE ENCOUNTER
----- Message from Dr. Henok Leach, DO sent at 11/18/2024  8:20 AM EST -----  Please let patient know that cardiac CTA is concerning for a severe stenosis in the OM1 and a possible severe stenosis in the RCA.  Given findings, will recommend proceeding with invasive coronary angiography/LHC for further evaluation of the patient's coronary anatomy.

## 2024-11-18 NOTE — TELEPHONE ENCOUNTER
Spoke with patient. He will call me back to schedule. Email sent with contact info as he was not at home.

## 2024-11-18 NOTE — TELEPHONE ENCOUNTER
Patient called office back relayed [facility] results and instructions. He is agreeable with proceeding. Case request submitted. Medication instructions relayed he RAGHU. May take all prescribed medications with sip of water morning of procedure.

## 2024-11-19 PROBLEM — R93.1 ABNORMAL COMPUTED TOMOGRAPHY ANGIOGRAPHY OF HEART: Status: ACTIVE | Noted: 2024-11-18

## 2024-11-19 NOTE — TELEPHONE ENCOUNTER
Pt returned call, aric unavailable at time of call. Informed pt she will call back when available, pt is concerned she wont be able to reach him due to phone issues. Pt states if she leaves a VM he will return call right away

## 2024-11-19 NOTE — TELEPHONE ENCOUNTER
Procedure:  [facility]  Doctor:  Dr. Leach  Date:  11/26/24  Time:  9am  Arrival:  7:30am  Reps:  n/a  Anesthesia:  n/a      Spoke with patient. Please have patient arrive to the main entrance of [facility] ([address] [place] [id number]) and check in with the registration desk.  They will be directed to the Cath Lab.  Remind patient to be NPO after midnight (8 hours prior). Do not apply lotions/creams on skin the day of procedure.      Patient started using Chantix this week (on 2nd day). Asking if ok to continue? Pleas advise.

## 2024-11-20 NOTE — TELEPHONE ENCOUNTER
P called in for update, verbal from [facility] to continue Jason, pt v/u.

## 2024-11-22 NOTE — TELEPHONE ENCOUNTER
Please inform patient to be NPO after midnight (8 hours).     Take all other medications including HTN meds and aspirin.     Do no take over the counter medications morning of.

## 2024-11-26 ENCOUNTER — HOSPITAL ENCOUNTER (OUTPATIENT)
Age: 64
Discharge: HOME | End: 2024-11-26
Attending: INTERNAL MEDICINE | Admitting: INTERNAL MEDICINE
Payer: MEDICARE

## 2024-11-26 VITALS
HEIGHT: 72 IN | BODY MASS INDEX: 30.71 KG/M2 | SYSTOLIC BLOOD PRESSURE: 148 MMHG | RESPIRATION RATE: 17 BRPM | OXYGEN SATURATION: 94 % | DIASTOLIC BLOOD PRESSURE: 103 MMHG | HEART RATE: 77 BPM | WEIGHT: 226.7 LBS

## 2024-11-26 DIAGNOSIS — R93.1 ABNORMAL COMPUTED TOMOGRAPHY ANGIOGRAPHY OF HEART: ICD-10-CM

## 2024-11-26 PROBLEM — R94.39 EQUIVOCAL STRESS TEST: Status: ACTIVE | Noted: 2024-11-26

## 2024-11-26 LAB
ANION GAP SERPL CALCULATED.3IONS-SCNC: 13 MMOL/L (ref 3–16)
BUN SERPL-MCNC: 18 MG/DL (ref 7–20)
CALCIUM SERPL-MCNC: 9.5 MG/DL (ref 8.3–10.6)
CHLORIDE SERPL-SCNC: 103 MMOL/L (ref 99–110)
CO2 SERPL-SCNC: 25 MMOL/L (ref 21–32)
CREAT SERPL-MCNC: 0.9 MG/DL (ref 0.8–1.3)
DEPRECATED RDW RBC AUTO: 14.3 % (ref 12.4–15.4)
ECHO BSA: 2.29 M2
EKG ATRIAL RATE: 79 BPM
EKG ATRIAL RATE: 82 BPM
EKG DIAGNOSIS: NORMAL
EKG DIAGNOSIS: NORMAL
EKG P AXIS: 21 DEGREES
EKG P AXIS: 32 DEGREES
EKG P-R INTERVAL: 136 MS
EKG P-R INTERVAL: 140 MS
EKG Q-T INTERVAL: 400 MS
EKG Q-T INTERVAL: 414 MS
EKG QRS DURATION: 150 MS
EKG QRS DURATION: 154 MS
EKG QTC CALCULATION (BAZETT): 467 MS
EKG QTC CALCULATION (BAZETT): 474 MS
EKG R AXIS: -20 DEGREES
EKG R AXIS: -55 DEGREES
EKG T AXIS: 17 DEGREES
EKG T AXIS: 5 DEGREES
EKG VENTRICULAR RATE: 79 BPM
EKG VENTRICULAR RATE: 82 BPM
GFR SERPLBLD CREATININE-BSD FMLA CKD-EPI: >90 ML/MIN/{1.73_M2}
GLUCOSE SERPL-MCNC: 108 MG/DL (ref 70–99)
HCT VFR BLD AUTO: 50.7 % (ref 40.5–52.5)
HGB BLD-MCNC: 17.2 G/DL (ref 13.5–17.5)
MCH RBC QN AUTO: 29.5 PG (ref 26–34)
MCHC RBC AUTO-ENTMCNC: 34 G/DL (ref 31–36)
MCV RBC AUTO: 86.9 FL (ref 80–100)
PLATELET # BLD AUTO: 204 K/UL (ref 135–450)
PMV BLD AUTO: 8.4 FL (ref 5–10.5)
POTASSIUM SERPL-SCNC: 3.8 MMOL/L (ref 3.5–5.1)
RBC # BLD AUTO: 5.83 M/UL (ref 4.2–5.9)
SODIUM SERPL-SCNC: 141 MMOL/L (ref 136–145)
WBC # BLD AUTO: 9.4 K/UL (ref 4–11)

## 2024-11-26 PROCEDURE — 99152 MOD SED SAME PHYS/QHP 5/>YRS: CPT | Performed by: INTERNAL MEDICINE

## 2024-11-26 PROCEDURE — C1887 CATHETER, GUIDING: HCPCS | Performed by: INTERNAL MEDICINE

## 2024-11-26 PROCEDURE — 93005 ELECTROCARDIOGRAM TRACING: CPT | Performed by: INTERNAL MEDICINE

## 2024-11-26 PROCEDURE — 99153 MOD SED SAME PHYS/QHP EA: CPT | Performed by: INTERNAL MEDICINE

## 2024-11-26 PROCEDURE — 93458 L HRT ARTERY/VENTRICLE ANGIO: CPT | Performed by: INTERNAL MEDICINE

## 2024-11-26 PROCEDURE — 6360000002 HC RX W HCPCS: Performed by: INTERNAL MEDICINE

## 2024-11-26 PROCEDURE — 6360000004 HC RX CONTRAST MEDICATION: Performed by: INTERNAL MEDICINE

## 2024-11-26 PROCEDURE — 2709999900 HC NON-CHARGEABLE SUPPLY: Performed by: INTERNAL MEDICINE

## 2024-11-26 PROCEDURE — 7100000011 HC PHASE II RECOVERY - ADDTL 15 MIN: Performed by: INTERNAL MEDICINE

## 2024-11-26 PROCEDURE — 93010 ELECTROCARDIOGRAM REPORT: CPT | Performed by: INTERNAL MEDICINE

## 2024-11-26 PROCEDURE — 7100000010 HC PHASE II RECOVERY - FIRST 15 MIN: Performed by: INTERNAL MEDICINE

## 2024-11-26 PROCEDURE — 80048 BASIC METABOLIC PNL TOTAL CA: CPT

## 2024-11-26 PROCEDURE — 2580000003 HC RX 258: Performed by: INTERNAL MEDICINE

## 2024-11-26 PROCEDURE — C1894 INTRO/SHEATH, NON-LASER: HCPCS | Performed by: INTERNAL MEDICINE

## 2024-11-26 PROCEDURE — C1769 GUIDE WIRE: HCPCS | Performed by: INTERNAL MEDICINE

## 2024-11-26 PROCEDURE — 6370000000 HC RX 637 (ALT 250 FOR IP): Performed by: INTERNAL MEDICINE

## 2024-11-26 PROCEDURE — 2500000003 HC RX 250 WO HCPCS: Performed by: INTERNAL MEDICINE

## 2024-11-26 PROCEDURE — 85027 COMPLETE CBC AUTOMATED: CPT

## 2024-11-26 RX ORDER — SODIUM CHLORIDE 9 MG/ML
INJECTION, SOLUTION INTRAVENOUS PRN
Status: DISCONTINUED | OUTPATIENT
Start: 2024-11-26 | End: 2024-11-26 | Stop reason: HOSPADM

## 2024-11-26 RX ORDER — ISOSORBIDE MONONITRATE 30 MG/1
30 TABLET, EXTENDED RELEASE ORAL DAILY
Qty: 90 TABLET | OUTPATIENT
Start: 2024-11-26

## 2024-11-26 RX ORDER — LIDOCAINE HYDROCHLORIDE 20 MG/ML
INJECTION, SOLUTION EPIDURAL; INFILTRATION; INTRACAUDAL; PERINEURAL PRN
Status: DISCONTINUED | OUTPATIENT
Start: 2024-11-26 | End: 2024-11-26 | Stop reason: HOSPADM

## 2024-11-26 RX ORDER — IOPAMIDOL 755 MG/ML
INJECTION, SOLUTION INTRAVASCULAR PRN
Status: DISCONTINUED | OUTPATIENT
Start: 2024-11-26 | End: 2024-11-26 | Stop reason: HOSPADM

## 2024-11-26 RX ORDER — ISOSORBIDE MONONITRATE 30 MG/1
30 TABLET, EXTENDED RELEASE ORAL DAILY
Qty: 30 TABLET | Refills: 3 | Status: SHIPPED | OUTPATIENT
Start: 2024-11-26

## 2024-11-26 RX ORDER — MIDAZOLAM 1 MG/ML
INJECTION INTRAMUSCULAR; INTRAVENOUS PRN
Status: DISCONTINUED | OUTPATIENT
Start: 2024-11-26 | End: 2024-11-26 | Stop reason: HOSPADM

## 2024-11-26 RX ORDER — HEPARIN SODIUM 1000 [USP'U]/ML
INJECTION, SOLUTION INTRAVENOUS; SUBCUTANEOUS PRN
Status: DISCONTINUED | OUTPATIENT
Start: 2024-11-26 | End: 2024-11-26 | Stop reason: HOSPADM

## 2024-11-26 RX ORDER — SODIUM CHLORIDE 0.9 % (FLUSH) 0.9 %
5-40 SYRINGE (ML) INJECTION PRN
Status: DISCONTINUED | OUTPATIENT
Start: 2024-11-26 | End: 2024-11-26 | Stop reason: HOSPADM

## 2024-11-26 RX ORDER — ASPIRIN 325 MG
325 TABLET ORAL ONCE
Status: COMPLETED | OUTPATIENT
Start: 2024-11-26 | End: 2024-11-26

## 2024-11-26 RX ORDER — ACETAMINOPHEN 325 MG/1
650 TABLET ORAL EVERY 4 HOURS PRN
Status: DISCONTINUED | OUTPATIENT
Start: 2024-11-26 | End: 2024-11-26 | Stop reason: HOSPADM

## 2024-11-26 RX ORDER — SODIUM CHLORIDE 0.9 % (FLUSH) 0.9 %
5-40 SYRINGE (ML) INJECTION EVERY 12 HOURS SCHEDULED
Status: DISCONTINUED | OUTPATIENT
Start: 2024-11-26 | End: 2024-11-26 | Stop reason: HOSPADM

## 2024-11-26 RX ADMIN — Medication 10 ML: at 08:37

## 2024-11-26 RX ADMIN — ASPIRIN 325 MG: 325 TABLET ORAL at 08:35

## 2024-11-26 RX ADMIN — SODIUM CHLORIDE: 9 INJECTION, SOLUTION INTRAVENOUS at 08:37

## 2024-11-26 NOTE — DISCHARGE INSTRUCTIONS
Cath Labs at  [facility]   Discharge Instructions        11/26/2024  Sudhir Kwon   Date of Birth 1960       Activity:  No driving for 24 hours.  In 24 hours you may remove dressing and shower, wash site gently with soap and water and leave open to air  Avoid submerging your arm in sitting water for 5 days.  Do not use your right hand for 24 hours, then  No lifting more than 5 pounds for 5 days.   No lotions, powders, or ointments near site for 5 days.   No work/school for 5 days unless instructed otherwise by your cardiologist.    Diet:   Resume previous diet, if a cardiac diet is specified you will receive a handout with  general guidelines.   Drink extra non-alcoholic/decaffienated fluids for first 24 hours after your procedure.    Arm Management:  If bleeding occurs from the site or a hematoma (lump) begins to increase in size, apply pressure directly over the site, call 911 to return to the hospital.    Special Instructions:  Report any coolness or numbness in the arm  Report any chills, fever, itching, red bumps or rash   Report any of the following to the MD: drainage from the site, redness and/or swelling at the site, increased tenderness at the site   If you are currently taking Metformin or Metformin combination medications for Diabetes, hold your dose for 48 hours after your procedure.  Consult your Cardiologist before taking any NSAIDS, vitamin supplements, estrogen, or estrogen plus progestin.  Do not stop taking Plavix, Brilinta or Effient, without first consulting your cardiologist.    Sedation Discharge Instructions:  For the next 24 hours do not drive a car, operate machinery, power tools or kitchen appliances.    Do not drink alcohol; including beer or wine.    Do not make any important decisions or sign any important papers.  For the next 24 hours you can expect drowsiness, light-headed or dizziness, nausea/ vomiting, inability to concentrate, fatigue and desire to sleep.  We

## 2024-11-26 NOTE — H&P
Complications:   none    Modified Mallampati:  II (soft palate, uvula, fauces visible)    ASA Classification:  Class 3 - A patient with severe systemic disease that limits activity but is not incapacitating    Pipe Scale:  Activity:  2 - Able to move 4 extremities voluntarily on command  Respiration:  2 - Able to breathe deeply and cough freely  Circulation:  2 - BP+/- 20mmHg of normal  Consciousness:  2 - Fully awake  Oxygen Saturation (color):  2 - Able to maintain oxygen saturation >92% on room air    Sedation/Anesthesia Plan:  Guard the patient's safety and welfare.  Minimize physical discomfort and pain.  Minimize negative psychological responses to treatment by providing sedation and analgesia and maximize the potential amnesia.  Patient to meet pre-procedure discharge plan.    Medication Planned:  midazolam intravenously and fentanyl intravenously    Patient is an appropriate candidate for plan of sedation:   yes      Electronically signed by Henok Leach DO on 11/26/2024 at 10:35 AM

## 2024-11-26 NOTE — PROGRESS NOTES
Discharge paperwork discussed with patient and his family. Radial site remained clean/dry/dressing intact after TR band removed. Armboard remained on. IV removed after patient got dressed. Wheeled to front entrance by wheelchair volunteer.  Family arrived to pick him up.

## 2024-11-26 NOTE — PROCEDURES
stenosis.  The OM2 and OM3 are very small vessels with mild disease.  The OM4 is a medium-large caliber vessel with a 100% ostial stenosis.  There are both left-to-left and right-to-left collaterals present.  The OM5 is a small-medium caliber vessel with minor luminal irregularities.  D. Right coronary artery: Dominant vessel.  The RCA has a 40% proximal stenosis and 30-40% distal stenosis.  The RPDA has minor luminal irregularities.  The first right posterolateral branch is a small, early bifurcating vessel with a 30% proximal stenosis.  The second right posterolateral branch is very small and has mild disease.  The third right posterolateral branch is a large vessel with a 20-30% proximal stenosis.    Technical Factors:  Complications:  None.  Estimated blood loss: Minimal.  Radiation:  Air kerma 961 mGy and 4.8 minutes of fluoroscopy  Sedation: Moderate conscious sedation was administered by qualified nursing personnel under continuous hemodynamic monitoring, starting at 9:36 AM and ending at 10:00 AM.  Medications: 2.5 mg IA verapamil, 4 mg IV Versed, 50 mcg IV Fentanyl, 5,000 units IV heparin  Contrast: 70cc of Isovue     Impression:  1. Severe single-vessel coronary artery disease with  of ostial OM4.  There are both left-to-left and right-to-left collaterals present.  2. Normal LVEDP.    Plan:  1. Recommend management with aggressive medical therapy.  2. Start imdur 30 mg daily.  3. Continue aspirin 81 mg daily and atorvastatin 40 mg daily.  4. Patient previously did not tolerate beta-blocker.  5. Follow-up with [facility] in one month.      Henok Leach DO  [facility]

## 2024-12-02 DIAGNOSIS — R07.9 CHEST PAIN, UNSPECIFIED TYPE: Primary | ICD-10-CM

## 2024-12-02 NOTE — TELEPHONE ENCOUNTER
Pt sts   isosorbide mononitrate (IMDUR) 30 MG extended release tablet   is causing severe headaches. Pt wants to know if something else can be prescribed.     Pt uses [id number] - [address] - P [phone] - F [phone]

## 2024-12-03 NOTE — TELEPHONE ENCOUNTER
Pt calling again stating the medication is causing him headaches and vision problems. Pt getting up set no call back yet. Pt also wants testing results. Please advise.

## 2024-12-04 DIAGNOSIS — R07.9 CHEST PAIN, UNSPECIFIED TYPE: ICD-10-CM

## 2024-12-04 RX ORDER — RANOLAZINE 500 MG/1
500 TABLET, EXTENDED RELEASE ORAL 2 TIMES DAILY
Qty: 60 TABLET | Refills: 3 | Status: SHIPPED | OUTPATIENT
Start: 2024-12-04

## 2024-12-04 RX ORDER — RANOLAZINE 500 MG/1
500 TABLET, EXTENDED RELEASE ORAL 2 TIMES DAILY
Qty: 180 TABLET | OUTPATIENT
Start: 2024-12-04

## 2024-12-04 NOTE — TELEPHONE ENCOUNTER
Pt returned call. Relayed [facility] message. Pt agreeable to trying ranexa. Rx pending sign off.

## 2024-12-04 NOTE — TELEPHONE ENCOUNTER
Let's please have patient stop taking imdur and start taking ranolazine 500 mg BID.    Please ask him what test results he is referring to?

## 2024-12-10 ENCOUNTER — TELEPHONE (OUTPATIENT)
Dept: CARDIOLOGY CLINIC | Age: 64
End: 2024-12-10

## 2024-12-10 NOTE — TELEPHONE ENCOUNTER
Pt had recent procedure w/ [facility], pt complains of bruising in both arms where he was shaved as well as groin area. Pt would like to know if this is normal or if it is something he should be worried about. Please advise.

## 2024-12-10 NOTE — TELEPHONE ENCOUNTER
Attempted to reach pt, left vm to call office back. Please ask questions below    When was your cath- 11/26/24  What site was accessed- right radial artery   Is there site pain-     Do you have pain anywhere else-     Is the site red-     What color is the site (red, black, blue, purple) -   Is the site swollen-     Is there a lump at the site-     Is the site warm to the touch-     Have you had any fevers-     Are you on any blood thinners? (Anti-coag and anti-platelet)-

## 2024-12-11 NOTE — TELEPHONE ENCOUNTER
When was your cath- 11/26/24  What site was accessed- right radial artery   Is there site pain- No    Do you have pain anywhere else- Yes L shoulder   Is the site red- No    What color is the site (red, black, blue, purple) - blue/purple  Is the site swollen- No    Is there a lump at the site- No    Is the site warm to the touch- No    Have you had any fevers- No    Are you on any blood thinners? (Anti-coag and anti-platelet)- asa

## 2024-12-16 NOTE — TELEPHONE ENCOUNTER
Attempted to reach pt regarding [facility] message below. Front- if pt calls back can you please schedule them for a site check in the early morning when a nurse is a available. Please place it on [facility] lab schedule. TY!

## 2024-12-17 NOTE — TELEPHONE ENCOUNTER
Pt sts he just got message. Pt sts he is not bruising at procedure site. It's the other areas where he was shaved that are bruised. Pt does not want to come in. Pt does want to know what type of diet/activities he should be following. Please advise.

## 2024-12-19 NOTE — TELEPHONE ENCOUNTER
If his bruising is improving and he does not have any pain, swelling, or significant bruising at his access site can hold off on coming in for site check at this time.  He should be following a heart healthy diet that is low in saturated fat, sodium, and cholesterol.

## 2025-01-29 NOTE — PROGRESS NOTES
[facility]  Office Visit    Sudhir Kwon  1960    February 3, 2025    CC:   Chief Complaint   Patient presents with    Follow-Up from Hospital     Discuss Cath     Hypertension    Chest Pain       HPI:  The patient is 64 y.o. male with a past medical history notable for obesity, HTN, HLD, tobacco use, osteoarthritis and lumbar DDD here for follow up after undergoing cardiac cath. He was seen in July 2024 by Dr. Leach in hospital follow up. He was admitted to [facility] in June 2024 with chest pain. EKG showed sinus rhythm with sinus arrhythmia and right bundle branch block. High-sensitivity troponin was within normal limits x3. A pharmacologic nuclear SPECT stress test was obtained and showed an apical septal and apical inferior defect equivocal for artifact versus ischemia. TTE obtained during his admission showed an LVEF of 55-60% with normal wall motion, normal diastolic function, normal RV size and systolic function, mild left atrial dilatation and no hemodynamically significant valvular abnormalities. He continued to have issues with chest pain and cardiac CTA was ordered. It came back abnormal and cardiac cath was recommended. He underwent cardiac cath on 11/26/24 which demonstrated severe single-vessel coronary artery disease with  of ostial OM4.  There are both left-to-left and right-to-left collaterals present. He was maintained on medical management and here for follow up.    Overall feeling okay from cardiac status. He has not had any cardiac issues. Has occasional palpitations that come and go  when he is anxious. No associated symptoms. Denies chest pain/discomfort, SOB, orthopnea/PND, cough, palpitations, dizziness, syncope, edema , weight change or claudication. No regular exercise d/t ortho problems. Has CARROLL and cannot tolerate CPAP. Lipitor decreased d/t H/A's.      Review of Systems:  Constitutional: Denies  fatigue, weakness, night sweats or fever.   HEENT: Denies new

## 2025-02-03 ENCOUNTER — TELEPHONE (OUTPATIENT)
Dept: CARDIOLOGY CLINIC | Age: 65
End: 2025-02-03

## 2025-02-03 ENCOUNTER — OFFICE VISIT (OUTPATIENT)
Dept: CARDIOLOGY CLINIC | Age: 65
End: 2025-02-03
Payer: MEDICARE

## 2025-02-03 VITALS
WEIGHT: 235 LBS | HEART RATE: 88 BPM | BODY MASS INDEX: 31.83 KG/M2 | SYSTOLIC BLOOD PRESSURE: 160 MMHG | HEIGHT: 72 IN | DIASTOLIC BLOOD PRESSURE: 80 MMHG | OXYGEN SATURATION: 95 %

## 2025-02-03 DIAGNOSIS — G89.4 CHRONIC PAIN DISORDER: ICD-10-CM

## 2025-02-03 DIAGNOSIS — I10 ESSENTIAL HYPERTENSION: ICD-10-CM

## 2025-02-03 DIAGNOSIS — R07.9 CHEST PAIN, UNSPECIFIED TYPE: ICD-10-CM

## 2025-02-03 DIAGNOSIS — Z72.0 TOBACCO ABUSE: Primary | ICD-10-CM

## 2025-02-03 DIAGNOSIS — R00.2 PALPITATIONS: ICD-10-CM

## 2025-02-03 DIAGNOSIS — E78.00 PURE HYPERCHOLESTEROLEMIA: ICD-10-CM

## 2025-02-03 DIAGNOSIS — I25.10 CORONARY ARTERY DISEASE INVOLVING NATIVE CORONARY ARTERY OF NATIVE HEART WITHOUT ANGINA PECTORIS: ICD-10-CM

## 2025-02-03 PROCEDURE — G8417 CALC BMI ABV UP PARAM F/U: HCPCS | Performed by: NURSE PRACTITIONER

## 2025-02-03 PROCEDURE — 3017F COLORECTAL CA SCREEN DOC REV: CPT | Performed by: NURSE PRACTITIONER

## 2025-02-03 PROCEDURE — 3079F DIAST BP 80-89 MM HG: CPT | Performed by: NURSE PRACTITIONER

## 2025-02-03 PROCEDURE — G8427 DOCREV CUR MEDS BY ELIG CLIN: HCPCS | Performed by: NURSE PRACTITIONER

## 2025-02-03 PROCEDURE — 3077F SYST BP >= 140 MM HG: CPT | Performed by: NURSE PRACTITIONER

## 2025-02-03 PROCEDURE — 4004F PT TOBACCO SCREEN RCVD TLK: CPT | Performed by: NURSE PRACTITIONER

## 2025-02-03 PROCEDURE — 99214 OFFICE O/P EST MOD 30 MIN: CPT | Performed by: NURSE PRACTITIONER

## 2025-02-03 RX ORDER — ROSUVASTATIN CALCIUM 20 MG/1
20 TABLET, COATED ORAL DAILY
Qty: 90 TABLET | Refills: 2 | Status: SHIPPED | OUTPATIENT
Start: 2025-02-03

## 2025-02-03 RX ORDER — RANOLAZINE 500 MG/1
500 TABLET, EXTENDED RELEASE ORAL 2 TIMES DAILY
Qty: 180 TABLET | Refills: 2 | Status: SHIPPED | OUTPATIENT
Start: 2025-02-03

## 2025-02-03 RX ORDER — METOPROLOL SUCCINATE 25 MG/1
25 TABLET, EXTENDED RELEASE ORAL DAILY
Qty: 90 TABLET | Refills: 2 | Status: SHIPPED | OUTPATIENT
Start: 2025-02-03

## 2025-02-03 NOTE — PATIENT INSTRUCTIONS
STOP ATORVASTATIN    Add Rosuvastatin 20 mg daily    Add Toprol XL (Metoprolol Succinate) 25 mg daily    Resume Ranexa 500 mg tablet twice a day    Continue other medications

## 2025-02-03 NOTE — TELEPHONE ENCOUNTER
PT contacted w/ questions in regards to why he is considered \"obese\"  pt was upset bc he does not believe he is obese for his height. Pt states his ppw states he is obese.

## 2025-04-28 ENCOUNTER — TELEPHONE (OUTPATIENT)
Dept: CARDIOLOGY CLINIC | Age: 65
End: 2025-04-28

## 2025-04-28 NOTE — TELEPHONE ENCOUNTER
Pt called in requesting CTA results from 11/2024, relayed [facility] message. Pt asked when he would have cath done, explained he already did 11/2024. Pt was unaware [facility] is no longer with [facility], relayed to pt that [facility] is with [facility] pt v/u. Relayed to pt he has upcoming ov with [facility] on 6/9/25 at 10am pt v/u

## (undated) DEVICE — CYSTOSCOPY: Brand: MEDLINE INDUSTRIES, INC.

## (undated) DEVICE — TRAP SPEC POLYPR SGL CHMBR FN MESH SCRN

## (undated) DEVICE — AIRLIFE™ NASAL OXYGEN CANNULA CURVED, FLARED TIP, WITH 7 FEET (2.1 M) CRUSH RESISTANT TUBING, OVER-THE-EAR STYLE: Brand: AIRLIFE™

## (undated) DEVICE — GLOVE ORANGE PI 7 1/2   MSG9075

## (undated) DEVICE — SYSTEM URO W/ IMPL DEL DEV FOR TREAT OF URIN OUTFLO: Type: IMPLANTABLE DEVICE | Site: URETHRA | Status: NON-FUNCTIONAL

## (undated) DEVICE — Z INACTIVE USE 2635503 SOLUTION IRRIG 3000ML ST H2O USP UROMATIC PLAS CONT

## (undated) DEVICE — ENDO CARRY-ON PROCEDURE KIT: Brand: ENDO CARRY-ON PROCEDURE KIT

## (undated) DEVICE — Z DISCONTINUED USE 2276105 GOWN PROTCT UNIV CHST W28IN L49IN SL 24IN BLU SPUNBOND FLM

## (undated) DEVICE — Device: Brand: DEFENDO VALVE AND CONNECTOR KIT

## (undated) DEVICE — ELECTRODE PT RET AD L9FT HI MOIST COND ADH HYDRGEL CORDED

## (undated) DEVICE — NEEDLE 25GAX5.0MM INJ CARR LOCKE

## (undated) DEVICE — Device: Brand: DISPOSABLE ELECTROSURGICAL SNARE